# Patient Record
Sex: FEMALE | Employment: OTHER | URBAN - METROPOLITAN AREA
[De-identification: names, ages, dates, MRNs, and addresses within clinical notes are randomized per-mention and may not be internally consistent; named-entity substitution may affect disease eponyms.]

---

## 2017-01-03 ENCOUNTER — TELEPHONE (OUTPATIENT)
Dept: FAMILY MEDICINE | Facility: CLINIC | Age: 62
End: 2017-01-03

## 2017-01-03 NOTE — TELEPHONE ENCOUNTER
BP Readings from Last 3 Encounters:   08/10/16 164/85   07/26/16 136/92   05/27/16 148/90     Pt on BP fail list. No clear plan from last visit. Does pt need to be seen for BP or can I schedule MA only BP check?      Jory Olmedo, [facility]

## 2017-01-05 NOTE — TELEPHONE ENCOUNTER
Verified pts insurance with the  staff and everything went through with the insurance card we have on file. Please call pt back tomorrow and let her know and schedule physical.      Jory Olmedo, [facility]

## 2017-01-05 NOTE — TELEPHONE ENCOUNTER
"Called and spoke with pt. Pt states she is unsure if she has insurance right now and does not wasnt to schedule an appointment until she knows for sure. Pt asking me if I can verify if her insurance will go through or not. I stated I am unable to do that. Pt says \"someone here can do it, so if you can get someone to verify then I will make appointment.\" Pt says she called insurance and they say she is covered but she got a letter saying she isn't as of 1/1/17.  "

## 2017-01-16 ENCOUNTER — OFFICE VISIT (OUTPATIENT)
Dept: FAMILY MEDICINE | Facility: CLINIC | Age: 62
End: 2017-01-16
Payer: COMMERCIAL

## 2017-01-16 VITALS
OXYGEN SATURATION: 98 % | HEART RATE: 83 BPM | DIASTOLIC BLOOD PRESSURE: 76 MMHG | BODY MASS INDEX: 34.37 KG/M2 | WEIGHT: 231 LBS | TEMPERATURE: 98.3 F | SYSTOLIC BLOOD PRESSURE: 154 MMHG

## 2017-01-16 DIAGNOSIS — I48.0 PAROXYSMAL ATRIAL FIBRILLATION (H): Chronic | ICD-10-CM

## 2017-01-16 DIAGNOSIS — Z00.01 ENCOUNTER FOR GENERAL ADULT MEDICAL EXAMINATION WITH ABNORMAL FINDINGS: ICD-10-CM

## 2017-01-16 DIAGNOSIS — G47.33 OSA (OBSTRUCTIVE SLEEP APNEA): Chronic | ICD-10-CM

## 2017-01-16 DIAGNOSIS — R53.83 FATIGUE, UNSPECIFIED TYPE: ICD-10-CM

## 2017-01-16 DIAGNOSIS — E78.5 HYPERLIPIDEMIA LDL GOAL <130: Chronic | ICD-10-CM

## 2017-01-16 DIAGNOSIS — R07.0 THROAT PAIN: ICD-10-CM

## 2017-01-16 DIAGNOSIS — J45.20 INTERMITTENT ASTHMA, UNCOMPLICATED: Chronic | ICD-10-CM

## 2017-01-16 DIAGNOSIS — L72.3 SEBACEOUS CYST: Primary | ICD-10-CM

## 2017-01-16 DIAGNOSIS — J01.90 ACUTE SINUSITIS WITH SYMPTOMS > 10 DAYS: ICD-10-CM

## 2017-01-16 DIAGNOSIS — E03.4 HYPOTHYROIDISM DUE TO ACQUIRED ATROPHY OF THYROID: Chronic | ICD-10-CM

## 2017-01-16 DIAGNOSIS — Z91.018 FOOD ALLERGY: ICD-10-CM

## 2017-01-16 DIAGNOSIS — R09.81 NASAL CONGESTION: ICD-10-CM

## 2017-01-16 DIAGNOSIS — R13.10 DYSPHAGIA, UNSPECIFIED TYPE: ICD-10-CM

## 2017-01-16 DIAGNOSIS — R73.01 IMPAIRED FASTING GLUCOSE: ICD-10-CM

## 2017-01-16 DIAGNOSIS — I10 ESSENTIAL HYPERTENSION WITH GOAL BLOOD PRESSURE LESS THAN 140/90: Chronic | ICD-10-CM

## 2017-01-16 LAB
DEPRECATED S PYO AG THROAT QL EIA: NORMAL
MICRO REPORT STATUS: NORMAL
SPECIMEN SOURCE: NORMAL

## 2017-01-16 PROCEDURE — 99213 OFFICE O/P EST LOW 20 MIN: CPT | Mod: 25 | Performed by: FAMILY MEDICINE

## 2017-01-16 PROCEDURE — 87880 STREP A ASSAY W/OPTIC: CPT | Performed by: FAMILY MEDICINE

## 2017-01-16 PROCEDURE — 87081 CULTURE SCREEN ONLY: CPT | Performed by: FAMILY MEDICINE

## 2017-01-16 PROCEDURE — 99396 PREV VISIT EST AGE 40-64: CPT | Performed by: FAMILY MEDICINE

## 2017-01-16 RX ORDER — LORATADINE 10 MG/1
10 TABLET ORAL DAILY
Qty: 90 TABLET | Refills: 1 | Status: SHIPPED | OUTPATIENT
Start: 2017-01-16 | End: 2018-05-14 | Stop reason: ALTCHOICE

## 2017-01-16 RX ORDER — SULFAMETHOXAZOLE/TRIMETHOPRIM 800-160 MG
1 TABLET ORAL 2 TIMES DAILY
Qty: 20 TABLET | Refills: 0 | Status: SHIPPED | OUTPATIENT
Start: 2017-01-16 | End: 2017-02-20

## 2017-01-16 RX ORDER — EPINEPHRINE 0.3 MG/.3ML
0.3 INJECTION SUBCUTANEOUS
Qty: 0.6 ML | Refills: 1 | Status: SHIPPED | OUTPATIENT
Start: 2017-01-16 | End: 2018-03-20

## 2017-01-16 NOTE — MR AVS SNAPSHOT
After Visit Summary   1/16/2017    Marie Kaiser    MRN: [id number]           Patient Information     Date Of Birth          1955        Visit Information        Provider Department      1/16/2017 6:20 PM Rosalinda Muse MD [facility]        Today's Diagnoses     Nasal congestion    -  1     Throat pain         Dysphagia, unspecified type         Food allergy         Acute sinusitis with symptoms > 10 days           Care Instructions    The phone # for the sleep clinic is:  [facility] [place]  [phone]. Call to attempt to get set up for the CPAP instead of the oral appliance.    Mammogram recommended -  You can call [phone] to schedule this at the [facility] in [place] (formerly the [facility]).    Return to clinic for fasting labs and we will evaluate for causes for fatigue.    Return to clinic for lesion removal from neck and cyst removal from head.    For your shoulder pain - MRI orders available for neck and shoulder.    Preventive Health Recommendations  Female Ages 50 - 64    Yearly exam: See your health care provider every year in order to  o Review health changes.   o Discuss preventive care.    o Review your medicines if your doctor has prescribed any.      Get a Pap test every three years (unless you have an abnormal result and your provider advises testing more often).    If you get Pap tests with HPV test, you only need to test every 5 years, unless you have an abnormal result.     You do not need a Pap test if your uterus was removed (hysterectomy) and you have not had cancer.    You should be tested each year for STDs (sexually transmitted diseases) if you're at risk.     Have a mammogram every 1 to 2 years.    Have a colonoscopy at age 50, or have a yearly FIT test (stool test). These exams screen for colon cancer.      Have a cholesterol test every 5 years, or more often if  advised.    Have a diabetes test (fasting glucose) every three years. If you are at risk for diabetes, you should have this test more often.     If you are at risk for osteoporosis (brittle bone disease), think about having a bone density scan (DEXA).    Shots: Get a flu shot each year. Get a tetanus shot every 10 years.    Nutrition:     Eat at least 5 servings of fruits and vegetables each day.    Eat whole-grain bread, whole-wheat pasta and brown rice instead of white grains and rice.    Talk to your provider about Calcium and Vitamin D.     Lifestyle    Exercise at least 150 minutes a week (30 minutes a day, 5 days a week). This will help you control your weight and prevent disease.    Limit alcohol to one drink per day.    No smoking.     Wear sunscreen to prevent skin cancer.     See your dentist every six months for an exam and cleaning.    See your eye doctor every 1 to 2 years.            Follow-ups after your visit        Additional Services     GASTROENTEROLOGY ADULT REFERRAL +/- PROCEDURE       Your provider has referred you to Gastroenterology Services.    English    Procedure/Referral: PROCEDURE ONLY - UPPER GI ENDOSCOPY (EGD) - Reason for procedure: Dysphagia  FMG: [facility] [phone]   [email or web address]      Please be aware that coverage of these services is subject to the terms and limitations of your health insurance plan.  Call member services at your health plan with any benefit or coverage questions.  Any procedures must be performed at a [place] facility OR coordinated by your clinic's referral office.    Please bring the following with you to your appointment:    (1) Any X-Rays, CTs or MRIs which have been performed.  Contact the facility where they were done to arrange for  prior to your scheduled appointment.    (2) List of current medications   (3) This referral request   (4) Any documents/labs given to you for  this referral                  Who to contact     If you have questions or need follow up information about today's clinic visit or your schedule please contact [facility] directly at [phone].  Normal or non-critical lab and imaging results will be communicated to you by [employer], letter or phone within 4 business days after the clinic has received the results. If you do not hear from us within 7 days, please contact the clinic through [employer] or phone. If you have a critical or abnormal lab result, we will notify you by phone as soon as possible.  Submit refill requests through [employer] or call your pharmacy and they will forward the refill request to us. Please allow 3 business days for your refill to be completed.          Additional Information About Your Visit        [email or web address] Information     [employer] gives you secure access to your electronic health record. If you see a primary care provider, you can also send messages to your care team and make appointments. If you have questions, please call your primary care clinic.  If you do not have a primary care provider, please call [phone] and they will assist you.        Care EveryWhere ID     This is your Care EveryWhere ID. This could be used by other organizations to access your [place] medical records  [id number]        Your Vitals Were     Pulse Temperature Pulse Oximetry             83 98.3  F (36.8  C) (Oral) 98%          Blood Pressure from Last 3 Encounters:   01/16/17 154/76   08/10/16 164/85   07/26/16 136/92    Weight from Last 3 Encounters:   01/16/17 104.781 kg (231 lb)   08/10/16 103.874 kg (229 lb)   07/26/16 105.461 kg (232 lb 8 oz)              We Performed the Following     Beta strep group A culture     GASTROENTEROLOGY ADULT REFERRAL +/- PROCEDURE     Strep, Rapid Screen          Today's Medication Changes          These changes are accurate as of: 1/16/17  7:24 PM.  If you have any questions, ask your nurse or doctor.                Start taking these medicines.        Dose/Directions    sulfamethoxazole-trimethoprim 800-160 MG per tablet   Commonly known as:  BACTRIM DS/SEPTRA DS   Used for:  Acute sinusitis with symptoms > 10 days   Started by:  Rosalinda Muse MD        Dose:  1 tablet   Take 1 tablet by mouth 2 times daily   Quantity:  20 tablet   Refills:  0            Where to get your medicines      These medications were sent to [facility] [id number] [facility] [place] - [address]  [address] [place] [address]     Phone:  [phone]    - EPINEPHrine 0.3 MG/0.3ML injection  - loratadine 10 MG tablet  - sulfamethoxazole-trimethoprim 800-160 MG per tablet             Primary Care Provider Office Phone # Fax #    Rosalinda Muse [phone]       [address]  [facility] [address]        Thank you!     Thank you for choosing [facility]  for your care. Our goal is always to provide you with excellent care. Hearing back from our patients is one way we can continue to improve our services. Please take a few minutes to complete the written survey that you may receive in the mail after your visit with us. Thank you!             Your Updated Medication List - Protect others around you: Learn how to safely use, store and throw away your medicines at www.disposemymeds.org.          This list is accurate as of: 1/16/17  7:24 PM.  Always use your most recent med list.                   Brand Name Dispense Instructions for use    EPINEPHrine 0.3 MG/0.3ML injection     0.6 mL    Inject 0.3 mLs (0.3 mg) into the muscle once as needed for anaphylaxis       lisinopril 5 MG tablet    PRINIVIL/ZESTRIL    180 tablet    Take 1 tablet (5 mg) by mouth 2 times daily       loratadine 10 MG tablet    CLARITIN    90 tablet    Take 1 tablet (10 mg) by mouth daily       sulfamethoxazole-trimethoprim 800-160 MG per tablet    BACTRIM DS/SEPTRA DS    20 tablet    Take 1 tablet by mouth 2  times daily

## 2017-01-17 ASSESSMENT — ASTHMA QUESTIONNAIRES: ACT_TOTALSCORE: 17

## 2017-01-17 NOTE — PATIENT INSTRUCTIONS
The phone # for the sleep clinic is:  [facility] [place] [facility] [phone]. Call to attempt to get set up for the CPAP instead of the oral appliance.    Mammogram recommended -  You can call [phone] to schedule this at the [facility] in [place] (formerly the [facility]).    Return to clinic for fasting labs and we will evaluate for causes for fatigue.    Return to clinic for lesion removal from neck and cyst removal from head.    For your shoulder pain - MRI orders available for neck and shoulder.    Preventive Health Recommendations  Female Ages 50 - 64    Yearly exam: See your health care provider every year in order to  o Review health changes.   o Discuss preventive care.    o Review your medicines if your doctor has prescribed any.      Get a Pap test every three years (unless you have an abnormal result and your provider advises testing more often).    If you get Pap tests with HPV test, you only need to test every 5 years, unless you have an abnormal result.     You do not need a Pap test if your uterus was removed (hysterectomy) and you have not had cancer.    You should be tested each year for STDs (sexually transmitted diseases) if you're at risk.     Have a mammogram every 1 to 2 years.    Have a colonoscopy at age 50, or have a yearly FIT test (stool test). These exams screen for colon cancer.      Have a cholesterol test every 5 years, or more often if advised.    Have a diabetes test (fasting glucose) every three years. If you are at risk for diabetes, you should have this test more often.     If you are at risk for osteoporosis (brittle bone disease), think about having a bone density scan (DEXA).    Shots: Get a flu shot each year. Get a tetanus shot every 10 years.    Nutrition:     Eat at least 5 servings of fruits and vegetables each day.    Eat whole-grain bread, whole-wheat pasta and brown rice instead of white grains and  rice.    Talk to your provider about Calcium and Vitamin D.     Lifestyle    Exercise at least 150 minutes a week (30 minutes a day, 5 days a week). This will help you control your weight and prevent disease.    Limit alcohol to one drink per day.    No smoking.     Wear sunscreen to prevent skin cancer.     See your dentist every six months for an exam and cleaning.    See your eye doctor every 1 to 2 years.
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
## 2017-01-17 NOTE — NURSING NOTE
"Chief Complaint   Patient presents with     Physical       Initial /76 mmHg  Pulse 83  Temp(Src) 98.3  F (36.8  C) (Oral)  Wt 104.781 kg (231 lb)  SpO2 98% Estimated body mass index is 34.37 kg/(m^2) as calculated from the following:    Height as of 8/10/16: 1.746 m (5' 8.75\").    Weight as of this encounter: 104.781 kg (231 lb).  BP completed using cuff size: carmelita Olmedo CMA      "

## 2017-01-18 LAB
BACTERIA SPEC CULT: NORMAL
MICRO REPORT STATUS: NORMAL
SPECIMEN SOURCE: NORMAL

## 2017-01-18 NOTE — PROGRESS NOTES
Quick Note:    Your final throat culture is negative for strep.  Hopefully your symptoms are improving with treatment given/recommended.  Please call or MyChart message me if you have any questions.      PSK  ______

## 2017-02-20 ENCOUNTER — OFFICE VISIT (OUTPATIENT)
Dept: FAMILY MEDICINE | Facility: CLINIC | Age: 62
End: 2017-02-20
Payer: COMMERCIAL

## 2017-02-20 VITALS
WEIGHT: 233 LBS | TEMPERATURE: 98 F | SYSTOLIC BLOOD PRESSURE: 136 MMHG | OXYGEN SATURATION: 95 % | BODY MASS INDEX: 34.51 KG/M2 | DIASTOLIC BLOOD PRESSURE: 88 MMHG | HEART RATE: 84 BPM | HEIGHT: 69 IN

## 2017-02-20 DIAGNOSIS — I10 HYPERTENSION GOAL BP (BLOOD PRESSURE) < 140/90: Chronic | ICD-10-CM

## 2017-02-20 DIAGNOSIS — D22.30 NEVUS OF FACE: ICD-10-CM

## 2017-02-20 DIAGNOSIS — J45.20 INTERMITTENT ASTHMA, UNCOMPLICATED: Chronic | ICD-10-CM

## 2017-02-20 DIAGNOSIS — J01.01 ACUTE RECURRENT MAXILLARY SINUSITIS: Primary | ICD-10-CM

## 2017-02-20 DIAGNOSIS — G47.33 OSA (OBSTRUCTIVE SLEEP APNEA): Chronic | ICD-10-CM

## 2017-02-20 DIAGNOSIS — B35.4 TINEA CORPORIS: ICD-10-CM

## 2017-02-20 PROCEDURE — 99214 OFFICE O/P EST MOD 30 MIN: CPT | Performed by: FAMILY MEDICINE

## 2017-02-20 RX ORDER — AMOXICILLIN 500 MG/1
500 CAPSULE ORAL 3 TIMES DAILY
Qty: 30 CAPSULE | Refills: 0 | Status: SHIPPED | OUTPATIENT
Start: 2017-02-20 | End: 2017-04-13

## 2017-02-20 RX ORDER — CLOTRIMAZOLE 1 %
CREAM (GRAM) TOPICAL 2 TIMES DAILY
Qty: 15 G | Refills: 1 | Status: SHIPPED | OUTPATIENT
Start: 2017-02-20 | End: 2018-01-17

## 2017-02-20 ASSESSMENT — PAIN SCALES - GENERAL: PAINLEVEL: NO PAIN (0)

## 2017-02-20 NOTE — PROGRESS NOTES
SUBJECTIVE:                                                    Marie Kaiser is a 61 year old female who presents to clinic today for the following health issues:    Medication Followup of Bactrim. Daughter had Amoxcillin high dose and patient would like to further discuss about this as a option for treatment due to completely helped daughter's symptoms recently.    Taking Medication as prescribed: NO-patient self discontinued    Side Effects:  Explosive diarrhea    Medication Helping Symptoms:  NO, still having sinus pressure and congestion. Green nasal discharge and coughing. No fevers.      Concern - Rash bilateral hands     Onset: x5days    Description:   Patient states she works at a  and one of the boys have a skin condition that have not been checked at the clinic yet. Patient has been using usual creams, eye cream, but the antifungal medication bough OTC helped the rash quit spreading.    Intensity: moderate    Progression of Symptoms:  worsening    Accompanying Signs & Symptoms:  Itching in the beginning until used the antifungal cream, redness       Previous history of similar problem:   None    Precipitating factors:   Worsened by: None    Alleviating factors:  Improved by: OTC antifungal cream       Therapies Tried and outcome: Usual creams, eye cream, antifungal cream OTC      Hypertension Follow-up      Outpatient blood pressures are not being checked.    Low Salt Diet: no added salt     Asthma Follow-Up    Was ACT completed today?    Yes    ACT Total Scores 2/20/2017   ACT TOTAL SCORE (Goal Greater than or Equal to 20) 22   In the past 12 months, how many times did you visit the emergency room for your asthma without being admitted to the hospital? 0   In the past 12 months, how many times were you hospitalized overnight because of your asthma? 0     Hypothyroidism Follow-up      Since last visit, patient describes the following symptoms: Weight stable, no hair loss, no skin changes,  no constipation, no loose stools       Problem list and histories reviewed & adjusted, as indicated.  Additional history: as documented    Patient Active Problem List   Diagnosis     Intermittent asthma     Major depressive disorder, recurrent episode, in full remission (HCC)     Hypothyroidism     Impaired fasting glucose     Hyperlipidemia LDL goal <130     Hypertension goal BP (blood pressure) < 140/90     Advanced directives, counseling/discussion     Diverticulitis of colon     A-fib (H)     Obesity     DONA (obstructive sleep apnea)- mild (AHI 5)     Essential hypertension with goal blood pressure less than 140/90     Past Surgical History   Procedure Laterality Date     Carpal tunnel release rt/lt       bilaterally     Hc dilation/curettage diag/ther non ob       Dr. Sanchez     Colonoscopy  8/8/2011     Procedure:COLONOSCOPY; Surgeon:KEISHA DAVIS; Location: OR       Social History   Substance Use Topics     Smoking status: Never Smoker     Smokeless tobacco: Never Used     Alcohol use No     Family History   Problem Relation Age of Onset     Asthma Father      GASTROINTESTINAL DISEASE Father      hiatal hernia/acid reflux     HEART DISEASE Brother      Afib     Asthma Brother      Respiratory Brother      sleep apnea     DIABETES Brother      Hypertension Brother      Depression Brother      Respiratory Sister      sleep apnea     Hypertension Sister      Thyroid Disease Sister      Obesity Sister      Depression Sister      DIABETES Sister      Alzheimer Disease Mother      advanced dementia     C.A.D. No family hx of      Breast Cancer No family hx of      Cancer - colorectal No family hx of      Prostate Cancer No family hx of      CANCER No family hx of      Allergies Daughter      cats     DIABETES Other      neice and nephew         Current Outpatient Prescriptions   Medication Sig Dispense Refill     amoxicillin (AMOXIL) 500 MG capsule Take 1 capsule (500 mg) by mouth 3 times daily 30 capsule  "0     clotrimazole (LOTRIMIN) 1 % cream Apply topically 2 times daily 15 g 1     loratadine (CLARITIN) 10 MG tablet Take 1 tablet (10 mg) by mouth daily 90 tablet 1     EPINEPHrine 0.3 MG/0.3ML injection Inject 0.3 mLs (0.3 mg) into the muscle once as needed for anaphylaxis 0.6 mL 1     lisinopril (PRINIVIL,ZESTRIL) 5 MG tablet Take 1 tablet (5 mg) by mouth 2 times daily 180 tablet 1     Allergies   Allergen Reactions     Cozaar Swelling     Cymbalta      Elevated blood pressure     Lexapro      Anxiety, tremors.     Zoloft      Joint pain, kidney pain     Ciprofloxacin      \"tendons on feet pull and can't walk\"     Flonase [Fluticasone Propionate]      Nose Bleeds     Latex Swelling     Difficult breathing, tissue swelling     Latex      Levaquin Other (See Comments)     Muscle cramps     Metronidazole      Morphine      Made her \"shut down\"     Prozac [Fluoxetine Hcl]      suicidal     Synthroid [Levothyroxine Sodium]      Heart raced     Ceftin Itching     Recent Labs   Lab Test  05/12/16   1115  11/16/15   1858  02/19/14   1318  09/13/13 06/07/13   0822   06/22/12   0747   02/21/11   0916   A1C   --    --    --    --    --   6.0   --    --    --    --    LDL   --   122   --    --    --   124   --   163*   --   135*   HDL   --   80   --    --    --   52   --   53   --   56   TRIG   --   90   --    --    --   89   --   135   --   152*   ALT   --    --    --    --   22   --    --   10   --   15   CR   --   0.62   --    --   0.82  0.69   < >  0.69   < >  0.66   GFRESTIMATED   --   >90  Non  GFR Calc     --    --    --   87   < >  88   < >  >90   GFRESTBLACK   --   >90   GFR Calc     --    --    --   >90   < >  >90   < >  >90   POTASSIUM  3.9  3.7   --    --   3.8  4.3   < >  4.2   < >  3.8   TSH   --   3.06  2.78   < >   --   1.40   < >  2.27   --   2.31    < > = values in this interval not displayed.      BP Readings from Last 3 Encounters:   02/20/17 136/88   01/16/17 154/76 " "  08/10/16 164/85    Wt Readings from Last 3 Encounters:   02/20/17 233 lb (105.7 kg)   01/16/17 231 lb (104.8 kg)   08/10/16 229 lb (103.9 kg)                  Labs reviewed in EPIC  Problem list, Medication list, Allergies, and Medical/Social/Surgical histories reviewed in [facility] and updated as appropriate.    ROS:  Constitutional, HEENT, cardiovascular, pulmonary, gi and gu systems are negative, except as otherwise noted.    OBJECTIVE:                                                    /88  Pulse 84  Temp 98  F (36.7  C) (Oral)  Ht 5' 8.75\" (1.746 m)  Wt 233 lb (105.7 kg)  SpO2 95%  BMI 34.66 kg/m2  Body mass index is 34.66 kg/(m^2).  GENERAL: mild acutely ill, alert and no distress, obese  EYES: Eyes grossly normal to inspection, PERRL and conjunctivae and sclerae normal  HENT: ear canals and TM's normal, nose and mouth without ulcers or lesions, throat is erythematous with no exudate. Sinuses tender to percussion over maxillary sinus area. Nasal discharge present.  NECK: anterior cervical adenopathy, no asymmetry, masses, or scars and thyroid normal to palpation  RESP: lungs clear to auscultation - no rales, rhonchi or wheezes  CV: regular rate and rhythm, normal S1 S2, no S3 or S4, no murmur, click or rub, no peripheral edema and peripheral pulses strong  ABDOMEN: soft, nontender, no hepatosplenomegaly, no masses and bowel sounds normal  MS: no gross musculoskeletal defects noted, no edema  SKIN: no suspicious lesions, erythema and rash on hands in ring form consistent with either tinea corporis or granuloma annulare , dark nevus on left side of face with inflammation.   NEURO: Normal strength and tone, mentation intact and speech normal  PSYCH: mentation appears normal, affect normal/bright     Diagnostic Test Results:  Results for orders placed or performed in visit on 01/16/17   Strep, Rapid Screen   Result Value Ref Range    Specimen Description Throat     Rapid Strep A Screen       NEGATIVE: No Group " "A streptococcal antigen detected by immunoassay, await   culture report.      Micro Report Status FINAL 01/16/2017    Beta strep group A culture   Result Value Ref Range    Specimen Description Throat     Culture Micro No Beta Streptococcus isolated     Micro Report Status FINAL 01/18/2017         ASSESSMENT/PLAN:                                                        BMI:   Estimated body mass index is 34.66 kg/(m^2) as calculated from the following:    Height as of this encounter: 5' 8.75\" (1.746 m).    Weight as of this encounter: 233 lb (105.7 kg).   Weight management plan: Discussed healthy diet and exercise guidelines and patient will follow up in 1 month in clinic to re-evaluate.        ICD-10-CM    1. Acute recurrent maxillary sinusitis J01.01 amoxicillin (AMOXIL) 500 MG capsule three times a day for 10 days. Use Afrin nose spray or other 12 hour spray twice a day for 3 days. Follow this spray 5-10 minutes later with steroid nose spray after nasal passages open up. Continue to use the steroid nose spray twice a day for the next 2-3 weeks or longer to keep the nasal passages and sinuses open.     2. Tinea corporis B35.4 clotrimazole (LOTRIMIN) 1 % cream twice a day until cleared   3. Nevus of face D22.30 DERMATOLOGY REFERRAL- side of left face   4. Hypertension goal BP (blood pressure) < 140/90 I10 Well controlled on medications    5. Intermittent asthma, uncomplicated J45.20 Well controlled on medications    6. DONA (obstructive sleep apnea)- mild (AHI 5) G47.33 Needs to open sinuses and nasal passage to be effective.        FUTURE LABS:       - Schedule fasting labs in 6 months  FUTURE APPOINTMENTS:       - Follow-up visit in 3 months or sooner if any questions or concerns.   Work on weight loss  Regular exercise  See Patient Instructions    Keily John MD  [facility]    "

## 2017-02-20 NOTE — PATIENT INSTRUCTIONS
How to contact your care team: [phone] Pharmacy [phone]   CAS BARROW MD KATYA GEORGIEV, PA-C CHRIS JONES, PA-C NAM HO, MD JONATHAN BATES, MD ARVIN VOCAL, MD    Clinic hours M-Th 7am-7pm Fri 7am-5pm.   Urgent care M-F 11am-9pm  Sat/Sun 9am-5pm.   Pharmacy   Mon-Th:  8:00am-8pm   Fri:  8:00am-6:00pm  Sat/Sun  8:00am-5:00 pm       Fungal Skin Infection (Tinea)  A fungal infection is when too much fungus grows on or in the body. Fungus normally lives on the skin in small amounts and does not cause harm. But when too much grows on the skin, it causes an infection. This is also known as tinea. Fungal skin infections are common and not often serious.  The infection often starts as a small red area the size of a pea. The skin may turn dry and flaky. The area may itch. As the fungus grows, it spreads out in a red [place]. Because of how it looks, fungal skin infection is often called ringworm, but it is not caused by a worm. Fungal skin infections can occur on many parts of the body. They can grow on the head, chest, arms, or legs. They can occur on the buttocks. On the feet, fungal infection is known as  athlete s foot.  It causes itchy, sometimes painful sores between the toes and the bottom or sides of the feet. In the groin, the rash is called  jock itch.   People with weakened immune systems can get a fungal infection more easily. This includes people with diabetes or HIV, or who are being treated for cancer. In these cases, the fungal infection can spread and cause severe illness. Fungal infections are also more common in people who are obese.  In most cases, treatment is done with antifungal cream or ointment. If the infection is on your scalp, you may take oral medication. In some cases, a tiny piece of the skin (biopsy) may be taken. This is so it can be tested in a lab.  Common fungal infections are treated with creams on the skin or oral medicine.  Home care  Follow all  instructions when using antifungal cream or ointment on your skin. The health care provider may advise using cornstarch powder to keep your skin dry or petroleum jelly to provide a barrier.  General care:    If you were prescribed an oral medicine, read the patient information. Talk with the health care provider about the risks and side effects.    Let your skin dry completely after bathing. Carefully dry your feet and between your toes.    Dress in loose cotton clothing.    Don t scratch the affected area. This can delay healing and may spread the infection. It can also cause a bacterial infection.    Keep your skin clean, but don t wash the skin too much. This can irritate your skin.    Keep in mind that it may take a week before the fungus starts to go away. It can take 2 to 4 weeks to fully clear. To prevent it from coming back, use the medicine until the rash is all gone.  Follow-up care  Follow up with your health care provider if the rash does not get better after 10 days of treatment. Also follow up if the rash spreads to other parts of your body.  When to seek medical advice  Call your health care provider right away if any of these occur:    Fever of 100.4 F (38 C) or higher    Redness or swelling that gets worse    Pain that gets worse    Foul-smelling fluid leaking from the skin       8380-2972 [employer] [address] [place] [address], [place] [address]. All rights reserved. This information is not intended as a substitute for professional medical care. Always follow your healthcare professional's instructions.        Acute Sinusitis    Acute sinusitis is inflammation (irritation and swelling) of the sinuses. It is usually from a bacterial infection that follows an upper respiratory viral infection. Your doctor can help you find relief. Read on to learn more.  What is acute sinusitis?  Sinuses are air-filled spaces in the skull behind the face. They are kept moist and clean by a lining of  mucosa. Things such as pollen, smoke, and chemical fumes can irritate the mucosa. It can then become inflamed (swell up). As a response to irritation, the mucosa makes more mucus and other fluids. Tiny hairlike cilia cover the mucosa. Cilia help transport mucus toward the opening of the sinus. Too much mucus may cause the cilia to stop working. This blocks the sinus opening. A buildup of fluid in the sinuses then leads to symptoms such as pain and pressure. It can also encourage growth of bacteria in the sinuses.  Common symptoms of acute sinusitis  You may have:    Facial soreness pain    Headache    Fever    Postnasal drip (drainage in the back of the throat)    Congestion    Drainage that is thick and colored, instead of clear    Cough  Diagnosis of acute sinusitis  The doctor will ask about your symptoms and medical history. He or she will examine your ear, nose, and throat. X-rays are usually not needed. If your sinusitis recurs, you may have a culture to check for bacteria or imaging tests.     An evaluation will be done. A culture (sample of mucus) is sometimes taken to check for bacteria. If you have multiple bouts of sinusitis, imaging (X-rays or CAT scans) may be done to check for an anatomic cause of the infection.  Treatment of acute sinusitis  Treatment is designed to unblock the sinus opening and help the cilia work again. Antihistamine and decongestant medications may be prescribed. These can reduce inflammation and decrease fluid production. If a bacterial infection is present, it is treated with antibiotic medication for 10 to 14 days. This medication should be taken until it is gone, even if you feel better.    3893-4017 [employer] [address] [place] [address], [place] [address]. All rights reserved. This information is not intended as a substitute for professional medical care. Always follow your healthcare professional's instructions.

## 2017-02-20 NOTE — MR AVS SNAPSHOT
After Visit Summary   2/20/2017    Marie Kaiser    MRN: [id number]           Patient Information     Date Of Birth          1955        Visit Information        Provider Department      2/20/2017 4:40 PM Keily John MD [facility]        Today's Diagnoses     Acute recurrent maxillary sinusitis    -  1    Tinea corporis        Nevus of face          Care Instructions    How to contact your care team: [phone] Pharmacy [phone]   CAS BARROW MD KATYA GEORGIEV, PA-C CHRIS JONES, PA-C NAM HO, MD JONATHAN BATES, MD ARVIN VOCAL, MD    Clinic hours M-Th 7am-7pm Fri 7am-5pm.   Urgent care M-F 11am-9pm  Sat/Sun 9am-5pm.   Pharmacy   Mon-Th:  8:00am-8pm   Fri:  8:00am-6:00pm  Sat/Sun  8:00am-5:00 pm       Fungal Skin Infection (Tinea)  A fungal infection is when too much fungus grows on or in the body. Fungus normally lives on the skin in small amounts and does not cause harm. But when too much grows on the skin, it causes an infection. This is also known as tinea. Fungal skin infections are common and not often serious.  The infection often starts as a small red area the size of a pea. The skin may turn dry and flaky. The area may itch. As the fungus grows, it spreads out in a red [place]. Because of how it looks, fungal skin infection is often called ringworm, but it is not caused by a worm. Fungal skin infections can occur on many parts of the body. They can grow on the head, chest, arms, or legs. They can occur on the buttocks. On the feet, fungal infection is known as  athlete s foot.  It causes itchy, sometimes painful sores between the toes and the bottom or sides of the feet. In the groin, the rash is called  jock itch.   People with weakened immune systems can get a fungal infection more easily. This includes people with diabetes or HIV, or who are being treated for cancer. In these cases, the fungal infection can spread  and cause severe illness. Fungal infections are also more common in people who are obese.  In most cases, treatment is done with antifungal cream or ointment. If the infection is on your scalp, you may take oral medication. In some cases, a tiny piece of the skin (biopsy) may be taken. This is so it can be tested in a lab.  Common fungal infections are treated with creams on the skin or oral medicine.  Home care  Follow all instructions when using antifungal cream or ointment on your skin. The health care provider may advise using cornstarch powder to keep your skin dry or petroleum jelly to provide a barrier.  General care:    If you were prescribed an oral medicine, read the patient information. Talk with the health care provider about the risks and side effects.    Let your skin dry completely after bathing. Carefully dry your feet and between your toes.    Dress in loose cotton clothing.    Don t scratch the affected area. This can delay healing and may spread the infection. It can also cause a bacterial infection.    Keep your skin clean, but don t wash the skin too much. This can irritate your skin.    Keep in mind that it may take a week before the fungus starts to go away. It can take 2 to 4 weeks to fully clear. To prevent it from coming back, use the medicine until the rash is all gone.  Follow-up care  Follow up with your health care provider if the rash does not get better after 10 days of treatment. Also follow up if the rash spreads to other parts of your body.  When to seek medical advice  Call your health care provider right away if any of these occur:    Fever of 100.4 F (38 C) or higher    Redness or swelling that gets worse    Pain that gets worse    Foul-smelling fluid leaking from the skin       0381-6314 [employer]. [address] [place] [address], [place] [address]. All rights reserved. This information is not intended as a substitute for professional medical care. Always follow your  healthcare professional's instructions.        Acute Sinusitis    Acute sinusitis is inflammation (irritation and swelling) of the sinuses. It is usually from a bacterial infection that follows an upper respiratory viral infection. Your doctor can help you find relief. Read on to learn more.  What is acute sinusitis?  Sinuses are air-filled spaces in the skull behind the face. They are kept moist and clean by a lining of mucosa. Things such as pollen, smoke, and chemical fumes can irritate the mucosa. It can then become inflamed (swell up). As a response to irritation, the mucosa makes more mucus and other fluids. Tiny hairlike cilia cover the mucosa. Cilia help transport mucus toward the opening of the sinus. Too much mucus may cause the cilia to stop working. This blocks the sinus opening. A buildup of fluid in the sinuses then leads to symptoms such as pain and pressure. It can also encourage growth of bacteria in the sinuses.  Common symptoms of acute sinusitis  You may have:    Facial soreness pain    Headache    Fever    Postnasal drip (drainage in the back of the throat)    Congestion    Drainage that is thick and colored, instead of clear    Cough  Diagnosis of acute sinusitis  The doctor will ask about your symptoms and medical history. He or she will examine your ear, nose, and throat. X-rays are usually not needed. If your sinusitis recurs, you may have a culture to check for bacteria or imaging tests.     An evaluation will be done. A culture (sample of mucus) is sometimes taken to check for bacteria. If you have multiple bouts of sinusitis, imaging (X-rays or CAT scans) may be done to check for an anatomic cause of the infection.  Treatment of acute sinusitis  Treatment is designed to unblock the sinus opening and help the cilia work again. Antihistamine and decongestant medications may be prescribed. These can reduce inflammation and decrease fluid production. If a bacterial infection is present, it  is treated with antibiotic medication for 10 to 14 days. This medication should be taken until it is gone, even if you feel better.    2852-2677 [employer]. [address] [place] [address], [place] [address]. All rights reserved. This information is not intended as a substitute for professional medical care. Always follow your healthcare professional's instructions.              Follow-ups after your visit        Additional Services     DERMATOLOGY REFERRAL       Your provider has referred you to: [facility]: [facility] [phone]   [email or web address]    Please be aware that coverage of these services is subject to the terms and limitations of your health insurance plan.  Call member services at your health plan with any benefit or coverage questions.      Please bring the following with you to your appointment:    (1) Any X-Rays, CTs or MRIs which have been performed.  Contact the facility where they were done to arrange for  prior to your scheduled appointment.    (2) List of current medications  (3) This referral request   (4) Any documents/labs given to you for this referral                  Who to contact     If you have questions or need follow up information about today's clinic visit or your schedule please contact [facility] directly at [phone].  Normal or non-critical lab and imaging results will be communicated to you by MyChart, letter or phone within 4 business days after the clinic has received the results. If you do not hear from us within 7 days, please contact the clinic through MyChart or phone. If you have a critical or abnormal lab result, we will notify you by phone as soon as possible.  Submit refill requests through Rain or call your pharmacy and they will forward the refill request to us. Please allow 3 business days for your refill to be completed.          Additional  "Information About Your Visit        MyChart Information     [email or web address] gives you secure access to your electronic health record. If you see a primary care provider, you can also send messages to your care team and make appointments. If you have questions, please call your primary care clinic.  If you do not have a primary care provider, please call [phone] and they will assist you.        Care EveryWhere ID     This is your Care EveryWhere ID. This could be used by other organizations to access your [facility] medical records  [id number]        Your Vitals Were     Pulse Temperature Height Pulse Oximetry BMI (Body Mass Index)       84 98  F (36.7  C) (Oral) 5' 8.75\" (1.746 m) 95% 34.66 kg/m2        Blood Pressure from Last 3 Encounters:   02/20/17 (!) 136/94   01/16/17 154/76   08/10/16 164/85    Weight from Last 3 Encounters:   02/20/17 233 lb (105.7 kg)   01/16/17 231 lb (104.8 kg)   08/10/16 229 lb (103.9 kg)              We Performed the Following     DERMATOLOGY REFERRAL          Today's Medication Changes          These changes are accurate as of: 2/20/17  5:39 PM.  If you have any questions, ask your nurse or doctor.               Start taking these medicines.        Dose/Directions    amoxicillin 500 MG capsule   Commonly known as:  AMOXIL   Used for:  Acute recurrent maxillary sinusitis        Dose:  500 mg   Take 1 capsule (500 mg) by mouth 3 times daily   Quantity:  30 capsule   Refills:  0       clotrimazole 1 % cream   Commonly known as:  LOTRIMIN   Used for:  Tinea corporis        Apply topically 2 times daily   Quantity:  15 g   Refills:  1            Where to get your medicines      These medications were sent to [facility] [place] - [place], [place] - [address]  [address], [facility] [address]     Phone:  [phone]     amoxicillin 500 MG capsule    clotrimazole 1 % cream                Primary Care Provider Office Phone # Fax #    Rosalinda Muse [phone] " [phone]       [facility] [address]  [facility] [address]        Thank you!     Thank you for choosing [facility]  for your care. Our goal is always to provide you with excellent care. Hearing back from our patients is one way we can continue to improve our services. Please take a few minutes to complete the written survey that you may receive in the mail after your visit with us. Thank you!             Your Updated Medication List - Protect others around you: Learn how to safely use, store and throw away your medicines at www.disposemymeds.org.          This list is accurate as of: 2/20/17  5:39 PM.  Always use your most recent med list.                   Brand Name Dispense Instructions for use    amoxicillin 500 MG capsule    AMOXIL    30 capsule    Take 1 capsule (500 mg) by mouth 3 times daily       clotrimazole 1 % cream    LOTRIMIN    15 g    Apply topically 2 times daily       EPINEPHrine 0.3 MG/0.3ML injection     0.6 mL    Inject 0.3 mLs (0.3 mg) into the muscle once as needed for anaphylaxis       lisinopril 5 MG tablet    PRINIVIL/ZESTRIL    180 tablet    Take 1 tablet (5 mg) by mouth 2 times daily       loratadine 10 MG tablet    CLARITIN    90 tablet    Take 1 tablet (10 mg) by mouth daily

## 2017-02-20 NOTE — NURSING NOTE
"Chief Complaint   Patient presents with     Recheck Medication     Bactrim     Derm Problem     Bilateral hand rash x5days       Initial BP (!) 136/94 (BP Location: Right arm, Patient Position: Chair, Cuff Size: Adult Large)  Pulse 84  Temp 98  F (36.7  C) (Oral)  Ht 5' 8.75\" (1.746 m)  Wt 233 lb (105.7 kg)  SpO2 95%  BMI 34.66 kg/m2 Estimated body mass index is 34.66 kg/(m^2) as calculated from the following:    Height as of this encounter: 5' 8.75\" (1.746 m).    Weight as of this encounter: 233 lb (105.7 kg).  Medication Reconciliation: complete     Dennis Ching CMA     "

## 2017-02-21 ASSESSMENT — ASTHMA QUESTIONNAIRES: ACT_TOTALSCORE: 22

## 2017-03-03 ENCOUNTER — TELEPHONE (OUTPATIENT)
Dept: DERMATOLOGY | Facility: CLINIC | Age: 62
End: 2017-03-03

## 2017-03-03 NOTE — TELEPHONE ENCOUNTER
Dermatology Pre-visit Call:    Reason for visit : spot on neck and growth on head     Any personal history of skin cancers: No    Was the patient referred: Yes    If the patient was referred, are records obtained: No. (If no, then obtain records).    Has the patient seen a dermatologist in the past: No. (If yes, obtain records)    Patient Reminders Given:  --Please, make sure you bring an updated list of your medications.   --Plan on being in our facility for approximately one hour, this includes the registration process, office visit, education and check-out process.  If you are having a procedure, more time may be required.     --If you are having a procedure, please, present 15 minutes early.  --Location reviewed.   --If you need to cancel or reschedule, call   --We look forward to seeing you in Dermatology Clinic.

## 2017-03-06 ENCOUNTER — TELEPHONE (OUTPATIENT)
Dept: FAMILY MEDICINE | Facility: CLINIC | Age: 62
End: 2017-03-06

## 2017-03-06 NOTE — TELEPHONE ENCOUNTER
Reason for Call:  Medication or medication refill:    Do you use a [facility]?  Name of the pharmacy and phone number for the current request:  [id number] [place] [address]    Name of the medication requested: MONETASONE SUROATE    Other request: Pt saw Dr John on 2/20/2017 for a rash and the ointment that was prescribed was not helping and her daughter had some ointment , see above, that helped a lot and asking for a prescription for this.    Can we leave a detailed message on this number? YES    Phone number patient can be reached at: Cell number on file:    Telephone Information:   Mobile [phone] or [phone]       Best Time: any    Call taken on 3/6/2017 at 9:33 AM by Catie Coburn

## 2017-03-07 DIAGNOSIS — I10 ESSENTIAL HYPERTENSION WITH GOAL BLOOD PRESSURE LESS THAN 140/90: Chronic | ICD-10-CM

## 2017-03-08 NOTE — TELEPHONE ENCOUNTER
lisinopril (PRINIVIL,ZESTRIL) 5 MG tablet      Last Written Prescription Date: 10/24/16  Last Fill Quantity: 180, # refills: 1  Last Office Visit with G, [facility] or [facility] prescribing provider: 2/20/17       Potassium   Date Value Ref Range Status   05/12/2016 3.9 3.4 - 5.3 mmol/L Final     Creatinine   Date Value Ref Range Status   11/16/2015 0.62 0.52 - 1.04 mg/dL Final     BP Readings from Last 3 Encounters:   02/20/17 136/88   01/16/17 154/76   08/10/16 164/85

## 2017-03-09 ENCOUNTER — OFFICE VISIT (OUTPATIENT)
Dept: DERMATOLOGY | Facility: CLINIC | Age: 62
End: 2017-03-09
Payer: COMMERCIAL

## 2017-03-09 DIAGNOSIS — R22.9 SUBCUTANEOUS NODULE: ICD-10-CM

## 2017-03-09 DIAGNOSIS — D48.5 NEOPLASM OF UNCERTAIN BEHAVIOR OF SKIN: ICD-10-CM

## 2017-03-09 DIAGNOSIS — L71.9 ACNE ROSACEA: Primary | ICD-10-CM

## 2017-03-09 DIAGNOSIS — R21 RASH: ICD-10-CM

## 2017-03-09 DIAGNOSIS — D22.9 MULTIPLE BENIGN NEVI: ICD-10-CM

## 2017-03-09 DIAGNOSIS — L82.1 SEBORRHEIC KERATOSIS: ICD-10-CM

## 2017-03-09 PROCEDURE — 88305 TISSUE EXAM BY PATHOLOGIST: CPT | Performed by: DERMATOLOGY

## 2017-03-09 PROCEDURE — 11101 HC BIOPSY SKIN/SUBQ/MUC MEM, EACH ADDTL LESION: CPT | Performed by: DERMATOLOGY

## 2017-03-09 PROCEDURE — 99203 OFFICE O/P NEW LOW 30 MIN: CPT | Mod: 25 | Performed by: DERMATOLOGY

## 2017-03-09 PROCEDURE — 11100 HC BIOPSY SKIN/SUBQ/MUC MEM, SINGLE LESION: CPT | Performed by: DERMATOLOGY

## 2017-03-09 RX ORDER — MOMETASONE FUROATE 1 MG/G
CREAM TOPICAL
Qty: 45 G | Refills: 0 | Status: SHIPPED | OUTPATIENT
Start: 2017-03-09 | End: 2018-01-26

## 2017-03-09 NOTE — MR AVS SNAPSHOT
After Visit Summary   3/9/2017    Marie Kaiser    MRN: [id number]           Patient Information     Date Of Birth          1955        Visit Information        Provider Department      3/9/2017 8:15 AM Trudi Santizo MD [facility]        Today's Diagnoses     Acne rosacea    -  1    Subcutaneous nodule        Neoplasm of uncertain behavior of skin        Rash        Multiple benign nevi        Seborrheic keratosis          Care Instructions    Wound Care After a Biopsy    What is a skin biopsy?  A skin biopsy allows the doctor to examine a very small piece of tissue under the microscope to determine the diagnosis and the best treatment for the skin condition. A local anesthetic (numbing medicine)  is injected with a very small needle into the skin area to be tested. A small piece of skin is taken from the area. Sometimes a suture (stitch) is used.     What are the risks of a skin biopsy?  I will experience scar, bleeding, swelling, pain, crusting and redness. I may experience incomplete removal or recurrence. Risks of this procedure are excessive bleeding, bruising, infection, nerve damage, numbness, thick (hypertrophic or keloidal) scar and non-diagnostic biopsy.    How should I care for my wound for the first 24 hours?    Keep the wound dry and covered for 24 hours    If it bleeds, hold direct pressure on the area for 15 minutes. If bleeding does not stop then go to the emergency room    Avoid strenuous exercise the first 1-2 days or as your doctor instructs you    How should I care for the wound after 24 hours?    After 24 hours, remove the bandage    You may bathe or shower as normal    If you had a scalp biopsy, you can shampoo as usual and can use shower water to clean the biopsy site daily    Clean the wound twice a day with gentle soap and water    Do not scrub, be gentle    Apply white petroleum/Vaseline after cleaning the wound with a cotton swab or a clean  finger, and keep the site covered with a Bandaid /bandage. Bandages are not necessary with a scalp biopsy    If you are unable to cover the site with a Bandaid /bandage, re-apply ointment 2-3 times a day to keep the site moist. Moisture will help with healing    Avoid strenuous activity for first 1-2 days    Avoid lakes, rivers, pools, and oceans until the stitches are removed or the site is healed    How do I clean my wound?    Wash hands for 15 minutes with soap or use hand  before all wound care    Clean the wound with gentle soap and water    Apply white petroleum/Vaseline  to wound after it is clean    Replace the Bandaid /bandage to keep the wound covered for the first few days or as instructed by your doctor    If you had a scalp biopsy, warm shower water to the area on a daily basis should suffice    What should I use to clean my wound?     Cotton-tipped applicators (Qtips )    White petroleum jelly (Vaseline ). Use a clean new container and use Q-tips to apply.    Bandaids   as needed    Gentle soap     How should I care for my wound long term?    Do not get your wound dirty    Keep up with wound care for one week or until the area is healed.    A small scab will form and fall off by itself when the area is completely healed. The area will be red and will become pink in color as it heals. Sun protection is very important for how your scar will turn out. Sunscreen with an SPF 30 or greater is recommended once the area is healed.    If you have stitches, stitches need to be removed in 5-7 days. You may return to our clinic for this or you may have it done locally at your doctor s office.    You should have some soreness but it should be mild and slowly go away over several days. Talk to your doctor about using tylenol for pain,    When should I call my doctor?  If you have increased:     Pain or swelling    Pus or drainage (clear or slightly yellow drainage is ok)    Temperature over  100F    Spreading redness or warmth around wound    When will I hear about my results?  The biopsy results can take 2-3 weeks to come back. The clinic will call you with the results, send you a mychart message, or have you schedule a follow-up clinic or phone time to discuss the results. Contact our clinics if you do not hear from us in 3 weeks.     Who should I call with questions?    [facility]: [phone]     [facility]: [phone]    For urgent needs outside of business hours call the [facility] at [phone] and ask for the dermatology resident on call      Vanicream Moisturizing Skin Cream          [email or web address]          Follow-ups after your visit        Your next 10 appointments already scheduled     Mar 14, 2017 10:00 AM CDT   Nurse Only with NURSE ONLY MG DERM   [facility])    [address] [place] [id number]   [phone]            Apr 13, 2017  1:00 PM CDT   PROCEDURE with Emily Dubois MD   [facility])    [address] [place] [id number]   [phone]            Mar 09, 2018  8:45 AM CST   Return Visit with Trudi Santizo MD   [facility])    [address] [place] [id number]   [phone]              Who to contact     If you have questions or need follow up information about today's clinic visit or your schedule please contact [facility] directly at [phone].  Normal or non-critical lab and imaging results will be communicated to you by MyChart, letter or phone within 4 business days after the clinic has received the results. If you do not hear from us within 7 days, please contact the clinic through MyChart or phone. If you have a critical or abnormal lab result, we will  notify you by phone as soon as possible.  Submit refill requests through [employer] or call your pharmacy and they will forward the refill request to us. Please allow 3 business days for your refill to be completed.          Additional Information About Your Visit        [employer] Information     [employer] gives you secure access to your electronic health record. If you see a primary care provider, you can also send messages to your care team and make appointments. If you have questions, please call your primary care clinic.  If you do not have a primary care provider, please call [phone] and they will assist you.      [employer] is an electronic gateway that provides easy, online access to your medical records. With [employer], you can request a clinic appointment, read your test results, renew a prescription or communicate with your care team.     To access your existing account, please contact your [facility] or call [phone] for assistance.        Care EveryWhere ID     This is your Care EveryWhere ID. This could be used by other organizations to access your [facility] medical records  [id number]         Blood Pressure from Last 3 Encounters:   02/20/17 136/88   01/16/17 154/76   08/10/16 164/85    Weight from Last 3 Encounters:   02/20/17 105.7 kg (233 lb)   01/16/17 104.8 kg (231 lb)   08/10/16 103.9 kg (229 lb)              We Performed the Following     BIOPSY SKIN/SUBQ/MUC MEM, EACH ADDTL LESION     BIOPSY SKIN/SUBQ/MUC MEM, SINGLE LESION     Surgical pathology exam          Today's Medication Changes          These changes are accurate as of: 3/9/17  9:21 AM.  If you have any questions, ask your nurse or doctor.               Start taking these medicines.        Dose/Directions    mometasone 0.1 % cream   Commonly known as:  ELOCON   Used for:  Rash   Started by:  Trudi Santizo MD        Apply twice daily for up to ten days   Quantity:  45 g   Refills:  0            Where to get  your medicines      These medications were sent to [facility] [address] [address] [facility] [place] - [address]  [address] [address]     Phone:  [phone]     mometasone 0.1 % cream                Primary Care Provider Office Phone # Fax #    Rosalinda Muse [phone]       [facility] [address]  [facility] [address]        Thank you!     Thank you for choosing [facility]  for your care. Our goal is always to provide you with excellent care. Hearing back from our patients is one way we can continue to improve our services. Please take a few minutes to complete the written survey that you may receive in the mail after your visit with us. Thank you!             Your Updated Medication List - Protect others around you: Learn how to safely use, store and throw away your medicines at www.disposemymeds.org.          This list is accurate as of: 3/9/17  9:21 AM.  Always use your most recent med list.                   Brand Name Dispense Instructions for use    amoxicillin 500 MG capsule    AMOXIL    30 capsule    Take 1 capsule (500 mg) by mouth 3 times daily       clotrimazole 1 % cream    LOTRIMIN    15 g    Apply topically 2 times daily       EPINEPHrine 0.3 MG/0.3ML injection     0.6 mL    Inject 0.3 mLs (0.3 mg) into the muscle once as needed for anaphylaxis       lisinopril 5 MG tablet    PRINIVIL/ZESTRIL    180 tablet    Take 1 tablet (5 mg) by mouth 2 times daily       loratadine 10 MG tablet    CLARITIN    90 tablet    Take 1 tablet (10 mg) by mouth daily       mometasone 0.1 % cream    ELOCON    45 g    Apply twice daily for up to ten days

## 2017-03-09 NOTE — LETTER
Patient:  Marie Kaiser  :   1955  MRN:     [id number]        Ms.Ashley JUDITH Kaiser  [address] [place] [id number]        2017    Dear ,    We are writing to inform you of your test results that show harmless spots. Normal moles on the left buttock and  left cheek were seen. Also,  an oil gland on the forehead and a harmless keratosis on the neck. No more treatment is needed.       Thank you for taking the time to be seen in our dermatology clinic. If you have further questions or concerns, please contact the clinic(see phone number listed below).       Sincerely,     Trudi Santizo MD      Department of Dermatology   [facility]: Phone: [phone], Fax:[phone]   [facility]: Phone: [phone], Fax: [phone]     Resulted Orders   Surgical pathology exam   Result Value Ref Range    Copath Report       Patient Name: MARIE KAISER  MR#: [id number]  Specimen #: [id number]  Collected: 3/9/2017  Received: 3/9/2017  Reported: 3/13/2017 11:37  Ordering Phy(s): TRUDI SANTIZO    For improved result formatting, select 'View Enhanced Report Format'  under Linked Documents section.    SPECIMEN(S):  A: Skin, left buttock  B: Skin, left cheek  C: Skin, left forehead  D: Skin, left neck    FINAL DIAGNOSIS:  A.  Skin, buttock, left:  - Intradermal melanocytic nevus - (see description)    B.  Skin, cheek, left:  - Compound melanocytic nevus - (see description)    C.  Skin, forehead, left:  - Sebaceous hyperplasia - (see description)    D.  Skin, neck, left:  - Seborrheic keratosis - (see description)    I have personally reviewed all specimens and or slides, including the  listed special stains, and used them with my medical judgement to  determine the final diagnosis.    Electronically signed out by:    Jovan Alonso M.D., [facility]    CLINICAL HISTORY:  The  "patient is a 61-year-old abner zamarripa.    GROSS:  A. The specimen is received in formalin with proper patient  identification and labeled \"skin, left buttock\" and consists of a 0.5 x  0.5 cm shave, remarkable for a 0.4 x 0.4 x 0.2 cm skin colored papule.  The base margin is inked in black.  Bisected and entirely submitted in  one cassette.    B. The specimen is received in formalin with proper patient  identification and labeled \"skin, left cheek\" and consists of a 0.2 x  0.2 cm shave, remarkable for pigmented papule. Entirely submitted in one  cassette.    C. The specimen is received in formalin with proper patient's  identification, labeled \"skin, left forehead\".  The specimen consists of  a 2 mm diameter punch skin biopsy tissue fragment measuring 0.2 cm in  depth.  Entirely submitted in one cassette.    D. The specimen is received in formalin with proper patient  identification and labeled \"skin, left neck\" and consists of a 0.7 x 0.7  x 0.2 cm shave, remarkable for scaly papule. The base margin is inked  black.  Tr isected and entirely submitted in one cassette. (Dictated by:  Theo Chamberlain 3/9/2017 02:56 PM)    MICROSCOPIC:  A.  The specimen shows intradermal nests and strands of benign-appearing  nevus cells without a prominent junctional component.  The lesion  extends to the deep margin.    B.  The specimen exhibits a compound melanocytic proliferation which is  predominantly intradermal, but with rare small nests at the  dermoepidermal junction, above nests and cords of melanocytes which  mature with descent in the dermis.  The lesion extends to the deep  margin.    C.  The specimen exhibits abundant mature sebaceous lobules radiating  around a central follicular unit.    D.  The specimen shows hyperkeratosis with basaloid epidermal  hyperplasia and horn cyst formation typical of a seborrheic keratosis.    CPT Codes:  A: 21434-CW8.T, 60187-SW0.P  B: 35875-BH4.T, 62823-SX3.P  C: 29597-UY7.T, 13377-IC3.P  D: " 87073-IM1.T, 64227-NN4.P    TESTING LAB LOCATION:  [facility], [address]   [address] [phone]    COLLECTION SITE:  Client: [facility]  Location: [facility])

## 2017-03-09 NOTE — PROGRESS NOTES
"[facility] Dermatology Note      Dermatology Problem List:  1. Rash, bilateral hands  -Previous Tx: OTC anti-fungal, OTC topicals without improvement, clotrimazole (initiated 2/202017) without improvement, mometasone with impovement    Encounter Date: Mar 9, 2017    CC:  Chief Complaint   Patient presents with     Derm Problem     mole on the LT side of the neck and on top of the head         History of Present Illness:  Ms. Marie Kaiser is a 61 year old female who presents as a referral from Dr. John for lesion on the left side of face. Today, the patient reports a growing lesion on the scalp. Also has a lesion on the left neck that she has had evaluated by two other physicians. The area recently became pruritic in the past week. Also notes a history of \"odd scarring\" after injury, she has a space on the scalp that appeared after she bumped her head on a cabinet. Reports history of \"unusual mole\" removal on the face. Reports the lesion on the left cheek comes and goes. Reports history of rash on the hands which did not improve with clotrimazole but improved with mometasone. The patient reports no other lesions of concern.    Past Medical History:   Patient Active Problem List   Diagnosis     Intermittent asthma     Major depressive disorder, recurrent episode, in full remission (HCC)     Hypothyroidism     Impaired fasting glucose     Hyperlipidemia LDL goal <130     Hypertension goal BP (blood pressure) < 140/90     Advanced directives, counseling/discussion     Diverticulitis of colon     A-fib (H)     Obesity     DONA (obstructive sleep apnea)- mild (AHI 5)     Essential hypertension with goal blood pressure less than 140/90     Past Medical History   Diagnosis Date     Abnormal Papanicolaou smear of cervix and cervical HPV      Allergic rhinitis due to other allergen      Backache, unspecified      Endometrial hyperplasia      Esophageal reflux      Female stress incontinence      " "Headache(784.0)      Intervertebral lumbar disc disorder with myelopathy, lumbar region      Irritable bowel syndrome      Lump or mass in breast      Myalgia and myositis, unspecified      Palpitations 5/26/2009     Panic disorder without agoraphobia      Symptomatic menopausal or female climacteric states      Viral warts, unspecified      Past Surgical History   Procedure Laterality Date     Carpal tunnel release rt/lt       bilaterally     Hc dilation/curettage diag/ther non ob       Dr. Sanchez     Colonoscopy  8/8/2011     Procedure:COLONOSCOPY; Surgeon:KEISHA DAVIS; Location: OR     Social History:  The patient works as a  provider. The patient denies use of tanning beds. The patient does not drink alcohol, smoke or use tobacco.    Family History:  There is no family history of skin cancer.  There is a family history of psoriasis, asthma and autoimmune disease.    Medications:  Current Outpatient Prescriptions   Medication Sig Dispense Refill     amoxicillin (AMOXIL) 500 MG capsule Take 1 capsule (500 mg) by mouth 3 times daily 30 capsule 0     clotrimazole (LOTRIMIN) 1 % cream Apply topically 2 times daily 15 g 1     loratadine (CLARITIN) 10 MG tablet Take 1 tablet (10 mg) by mouth daily 90 tablet 1     EPINEPHrine 0.3 MG/0.3ML injection Inject 0.3 mLs (0.3 mg) into the muscle once as needed for anaphylaxis 0.6 mL 1     lisinopril (PRINIVIL,ZESTRIL) 5 MG tablet Take 1 tablet (5 mg) by mouth 2 times daily 180 tablet 1     Allergies   Allergen Reactions     Cozaar Swelling     Cymbalta      Elevated blood pressure     Lexapro      Anxiety, tremors.     Zoloft      Joint pain, kidney pain     Ciprofloxacin      \"tendons on feet pull and can't walk\"     Flonase [Fluticasone Propionate]      Nose Bleeds     Latex Swelling     Difficult breathing, tissue swelling     Latex      Levaquin Other (See Comments)     Muscle cramps     Metronidazole      Morphine      Made her \"shut down\"     Prozac " [Fluoxetine Hcl]      suicidal     Synthroid [Levothyroxine Sodium]      Heart raced     Ceftin Itching     Review of Systems:  -Skin/Heme New Pt: The patient admits to frequent sun exposure. The patient denies excessive scarring or problems healing except as per HPI. The patient denies excessive bleeding.  -Const: Denies fevers or chills  -Skin: As above in HPI. No additional skin concerns.    Physical exam:  Vitals: There were no vitals taken for this visit.  GEN: This is a well developed, well-nourished female in no acute distress, in a pleasant mood.    SKIN: Total skin excluding the undergarment areas was performed. The exam included the head/face, neck, both arms, chest, back, abdomen, both legs, digits and/or nails. Including exam of the buttocks, declines genital exam  -Multiple regular brown pigmented macules and papules are identified on the trunk and extremities.   -There are waxy stuck on tan to brown papules on the trunk and extremities.  -Stuck on 1.2cm plaque on the left neck.   -Yellow oily 3mm papule on the left forehead.   -Pigmented 3mm papule on the left cheek.   -Faint pink patches on the dorsal hands.   -1cm subcutaneous nodule on the left vertex scalp.  -Fleshy 5mm papule on the left buttock.   -No other lesions of concern on areas examined.     Impression/Plan:  1. Multiple clinically benign nevi on the trunk and extremities    No further intervention required at this time.  2. Seborrheic keratosis, trunk and extremities     No further intervention required at this time.   3. 1cm subcutaneous nodule, left vertex scalp. Favor cyst    Plan to schedule for excision.  4. Faint pink patches, dorsal hands. Very faint today. Improved with topicals steroids, rash most consistent with eczema    For flares, start mometasone cream. Apply twice daily for up to 10 days.     Recommend moisturizer    Call clinic if not resolved in 2 weeks.   5. Fleshy 5mm papule, left buttock. History of catching and  irritation. Neoplasm of uncertain behavior. Differential diagnosis includes nevus versus neurofibroma, patient desires removal    Shave biopsy:  After discussion of benefits and risks including but not limited to bleeding/bruising, pain/swelling, infection, scar, incomplete removal, nerve damage/numbness, recurrence, and non-diagnostic biopsy, written consent, verbal consent and photographs were obtained. Time-out was performed. The area was cleaned with isopropyl alcohol. 0.5 mL of 1% lidocaine with epinephrine was injected to obtain adequate anesthesia of the lesion on the left buttock. A shave biopsy was performed. Hemostasis was achieved with aluminium chloride. Vaseline and a sterile dressing were applied. The patient tolerated the procedure and no complications were noted. The patient was provided with verbal and written post care instructions.   6. Pigmented 3mm papule, left cheek. Neoplasm of uncertain behavior. Differential diagnosis includes nevus versus BCC    Shave biopsy:  After discussion of benefits and risks including but not limited to bleeding/bruising, pain/swelling, infection, scar, incomplete removal, nerve damage/numbness, recurrence, and non-diagnostic biopsy, written consent, verbal consent and photographs were obtained. Time-out was performed. The area was cleaned with isopropyl alcohol. 0.5 mL of 1% lidocaine with epinephrine was injected to obtain adequate anesthesia of the lesion on the left cheek. A shave biopsy was performed. Hemostasis was achieved with aluminium chloride. Vaseline and a sterile dressing were applied. The patient tolerated the procedure and no complications were noted. The patient was provided with verbal and written post care instructions.   7. Yellow oily 3mm papule, left forehead. Neoplasm of uncertain behavior. Differential diagnosis includes sebaceous hyperplasia versus other    Punch biopsy:  After discussion of benefits and risks including but not limited to  bleeding/bruising, pain/swelling, infection, scar, incomplete removal, nerve damage/numbness, recurrence, and non-diagnostic biopsy, written consent, verbal consent and photographs were obtained. Time-out was performed. The area was cleaned with isopropyl alcohol. 0.5 mL of 1% lidocaine with epinephrine was injected to obtain adequate anesthesia of the lesion on the left forehead. A 2 mm punch biopsy was performed.  6-0 prolene sutures were utilized to approximate the epidermal edges.  White petroleum jelly/VaselineTM and a bandage was applied to the wound.  Explicit verbal and written wound care instructions were provided.  The patient left the Dermatology Clinic in good condition. The patient was counseled to follow up for suture removal in approximately 5-7 days.  8. Stuck on 1.2cm plaque, left neck. Neoplasm of uncertain behavior. Differential diagnosis includes SK versus other. Pt desires removal    Shave biopsy:  After discussion of benefits and risks including but not limited to bleeding/bruising, pain/swelling, infection, scar, incomplete removal, nerve damage/numbness, recurrence, and non-diagnostic biopsy, written consent, verbal consent and photographs were obtained. Time-out was performed. The area was cleaned with isopropyl alcohol. 0.5 mL of 1% lidocaine with epinephrine was injected to obtain adequate anesthesia of the lesion on the left neck. A shave biopsy was performed. Hemostasis was achieved with aluminium chloride. Vaseline and a sterile dressing were applied. The patient tolerated the procedure and no complications were noted. The patient was provided with verbal and written post care instructions.     CC Dr. John on close of this encounter.  Follow-up in for excision and in 1 year(patient preference), earlier pending biopsy, earlier for new or changing lesions.     Staff Involved:  Scribe/Staff    Scribe Disclosure:   Rhea OLSEN, am serving as a scribe to document services personally  performed by Dr. Trudi Santizo, based on data collection and the provider's statements to me.     Provider Disclosure:   I agree with above History, Review of Systems, Physical exam and Plan. I have reviewed the content of the documentation and have edited it as needed. I have personally performed the services documented here and the documentation accurately represents those services and the decisions I have made.     Trudi Santizo MD    Department of Dermatology  [facility]: Phone: [phone], Fax:[phone]  [facility]: Phone: [phone], Fax: [phone]

## 2017-03-09 NOTE — PATIENT INSTRUCTIONS
Wound Care After a Biopsy    What is a skin biopsy?  A skin biopsy allows the doctor to examine a very small piece of tissue under the microscope to determine the diagnosis and the best treatment for the skin condition. A local anesthetic (numbing medicine)  is injected with a very small needle into the skin area to be tested. A small piece of skin is taken from the area. Sometimes a suture (stitch) is used.     What are the risks of a skin biopsy?  I will experience scar, bleeding, swelling, pain, crusting and redness. I may experience incomplete removal or recurrence. Risks of this procedure are excessive bleeding, bruising, infection, nerve damage, numbness, thick (hypertrophic or keloidal) scar and non-diagnostic biopsy.    How should I care for my wound for the first 24 hours?    Keep the wound dry and covered for 24 hours    If it bleeds, hold direct pressure on the area for 15 minutes. If bleeding does not stop then go to the emergency room    Avoid strenuous exercise the first 1-2 days or as your doctor instructs you    How should I care for the wound after 24 hours?    After 24 hours, remove the bandage    You may bathe or shower as normal    If you had a scalp biopsy, you can shampoo as usual and can use shower water to clean the biopsy site daily    Clean the wound twice a day with gentle soap and water    Do not scrub, be gentle    Apply white petroleum/Vaseline after cleaning the wound with a cotton swab or a clean finger, and keep the site covered with a Bandaid /bandage. Bandages are not necessary with a scalp biopsy    If you are unable to cover the site with a Bandaid /bandage, re-apply ointment 2-3 times a day to keep the site moist. Moisture will help with healing    Avoid strenuous activity for first 1-2 days    Avoid lakes, rivers, pools, and oceans until the stitches are removed or the site is healed    How do I clean my wound?    Wash hands for 15 minutes with soap or use hand  before  all wound care    Clean the wound with gentle soap and water    Apply white petroleum/Vaseline  to wound after it is clean    Replace the Bandaid /bandage to keep the wound covered for the first few days or as instructed by your doctor    If you had a scalp biopsy, warm shower water to the area on a daily basis should suffice    What should I use to clean my wound?     Cotton-tipped applicators (Qtips )    White petroleum jelly (Vaseline ). Use a clean new container and use Q-tips to apply.    Bandaids   as needed    Gentle soap     How should I care for my wound long term?    Do not get your wound dirty    Keep up with wound care for one week or until the area is healed.    A small scab will form and fall off by itself when the area is completely healed. The area will be red and will become pink in color as it heals. Sun protection is very important for how your scar will turn out. Sunscreen with an SPF 30 or greater is recommended once the area is healed.    If you have stitches, stitches need to be removed in 5-7 days. You may return to our clinic for this or you may have it done locally at your doctor s office.    You should have some soreness but it should be mild and slowly go away over several days. Talk to your doctor about using tylenol for pain,    When should I call my doctor?  If you have increased:     Pain or swelling    Pus or drainage (clear or slightly yellow drainage is ok)    Temperature over 100F    Spreading redness or warmth around wound    When will I hear about my results?  The biopsy results can take 2-3 weeks to come back. The clinic will call you with the results, send you a Alma Johns message, or have you schedule a follow-up clinic or phone time to discuss the results. Contact our clinics if you do not hear from us in 3 weeks.     Who should I call with questions?    [facility]: [phone]     [facility]:  [phone]    For urgent needs outside of business hours call the [facility] at [phone] and ask for the dermatology resident on call      Vanicream Moisturizing Skin Cream          [email or web address]

## 2017-03-09 NOTE — NURSING NOTE
Dermatology Rooming Note    Marie Kaiser's goals for this visit include:   Chief Complaint   Patient presents with     Derm Problem     mole on the LT side of the neck and on top of the head       Is a scribe okay for this visit:YES    Are records needed for this visit(If yes, obtain release of information): Not applicable     Vitals: There were no vitals taken for this visit.    Referring Provider:  Keily John MD  [place] [address]  [facility] [place] [address]

## 2017-03-09 NOTE — LETTER
"3/9/2017       RE: Marie Kaiser  [address] [place] [id number]     Dear Colleague,    Thank you for referring your patient, Marie Kaiser, to the [facility] at [facility]. Please see a copy of my visit note below.    [facility] Dermatology Note      Dermatology Problem List:  1. Rash, bilateral hands  -Previous Tx: OTC anti-fungal, OTC topicals without improvement, clotrimazole (initiated 2/202017) without improvement, mometasone with impovement    Encounter Date: Mar 9, 2017    CC:  Chief Complaint   Patient presents with     Derm Problem     mole on the LT side of the neck and on top of the head         History of Present Illness:  Ms. Marie Kaiser is a 61 year old female who presents as a referral from Dr. John for lesion on the left side of face. Today, the patient reports a growing lesion on the scalp. Also has a lesion on the left neck that she has had evaluated by two other physicians. The area recently became pruritic in the past week. Also notes a history of \"odd scarring\" after injury, she has a space on the scalp that appeared after she bumped her head on a cabinet. Reports history of \"unusual mole\" removal on the face. Reports the lesion on the left cheek comes and goes. Reports history of rash on the hands which did not improve with clotrimazole but improved with mometasone. The patient reports no other lesions of concern.    Past Medical History:   Patient Active Problem List   Diagnosis     Intermittent asthma     Major depressive disorder, recurrent episode, in full remission (HCC)     Hypothyroidism     Impaired fasting glucose     Hyperlipidemia LDL goal <130     Hypertension goal BP (blood pressure) < 140/90     Advanced directives, counseling/discussion     Diverticulitis of colon     A-fib (H)     Obesity     DONA (obstructive sleep apnea)- mild (AHI 5)     Essential hypertension with " goal blood pressure less than 140/90     Past Medical History   Diagnosis Date     Abnormal Papanicolaou smear of cervix and cervical HPV      Allergic rhinitis due to other allergen      Backache, unspecified      Endometrial hyperplasia      Esophageal reflux      Female stress incontinence      Headache(784.0)      Intervertebral lumbar disc disorder with myelopathy, lumbar region      Irritable bowel syndrome      Lump or mass in breast      Myalgia and myositis, unspecified      Palpitations 5/26/2009     Panic disorder without agoraphobia      Symptomatic menopausal or female climacteric states      Viral warts, unspecified      Past Surgical History   Procedure Laterality Date     Carpal tunnel release rt/lt       bilaterally     Hc dilation/curettage diag/ther non ob       Dr. Sanchez     Colonoscopy  8/8/2011     Procedure:COLONOSCOPY; Surgeon:KEISHA DAVIS; Location: OR     Social History:  The patient works as a  provider. The patient denies use of tanning beds. The patient does not drink alcohol, smoke or use tobacco.    Family History:  There is no family history of skin cancer.  There is a family history of psoriasis, asthma and autoimmune disease.    Medications:  Current Outpatient Prescriptions   Medication Sig Dispense Refill     amoxicillin (AMOXIL) 500 MG capsule Take 1 capsule (500 mg) by mouth 3 times daily 30 capsule 0     clotrimazole (LOTRIMIN) 1 % cream Apply topically 2 times daily 15 g 1     loratadine (CLARITIN) 10 MG tablet Take 1 tablet (10 mg) by mouth daily 90 tablet 1     EPINEPHrine 0.3 MG/0.3ML injection Inject 0.3 mLs (0.3 mg) into the muscle once as needed for anaphylaxis 0.6 mL 1     lisinopril (PRINIVIL,ZESTRIL) 5 MG tablet Take 1 tablet (5 mg) by mouth 2 times daily 180 tablet 1     Allergies   Allergen Reactions     Cozaar Swelling     Cymbalta      Elevated blood pressure     Lexapro      Anxiety, tremors.     Zoloft      Joint pain, kidney pain      "Ciprofloxacin      \"tendons on feet pull and can't walk\"     Flonase [Fluticasone Propionate]      Nose Bleeds     Latex Swelling     Difficult breathing, tissue swelling     Latex      Levaquin Other (See Comments)     Muscle cramps     Metronidazole      Morphine      Made her \"shut down\"     Prozac [Fluoxetine Hcl]      suicidal     Synthroid [Levothyroxine Sodium]      Heart raced     Ceftin Itching     Review of Systems:  -Skin/Heme New Pt: The patient admits to frequent sun exposure. The patient denies excessive scarring or problems healing except as per HPI. The patient denies excessive bleeding.  -Const: Denies fevers or chills  -Skin: As above in HPI. No additional skin concerns.    Physical exam:  Vitals: There were no vitals taken for this visit.  GEN: This is a well developed, well-nourished female in no acute distress, in a pleasant mood.    SKIN: Total skin excluding the undergarment areas was performed. The exam included the head/face, neck, both arms, chest, back, abdomen, both legs, digits and/or nails. Including exam of the buttocks, declines genital exam  -Multiple regular brown pigmented macules and papules are identified on the trunk and extremities.   -There are waxy stuck on tan to brown papules on the trunk and extremities.  -Stuck on 1.2cm plaque on the left neck.   -Yellow oily 3mm papule on the left forehead.   -Pigmented 3mm papule on the left cheek.   -Faint pink patches on the dorsal hands.   -1cm subcutaneous nodule on the left vertex scalp.  -Fleshy 5mm papule on the left buttock.   -No other lesions of concern on areas examined.     Impression/Plan:  1. Multiple clinically benign nevi on the trunk and extremities    No further intervention required at this time.  2. Seborrheic keratosis, trunk and extremities     No further intervention required at this time.   3. 1cm subcutaneous nodule, left vertex scalp. Favor cyst    Plan to schedule for excision.  4. Faint pink patches, dorsal " hands. Very faint today. Improved with topicals steroids, rash most consistent with eczema    For flares, start mometasone cream. Apply twice daily for up to 10 days.     Recommend moisturizer    Call clinic if not resolved in 2 weeks.   5. Fleshy 5mm papule, left buttock. History of catching and irritation. Neoplasm of uncertain behavior. Differential diagnosis includes nevus versus neurofibroma, patient desires removal    Shave biopsy:  After discussion of benefits and risks including but not limited to bleeding/bruising, pain/swelling, infection, scar, incomplete removal, nerve damage/numbness, recurrence, and non-diagnostic biopsy, written consent, verbal consent and photographs were obtained. Time-out was performed. The area was cleaned with isopropyl alcohol. 0.5 mL of 1% lidocaine with epinephrine was injected to obtain adequate anesthesia of the lesion on the left buttock. A shave biopsy was performed. Hemostasis was achieved with aluminium chloride. Vaseline and a sterile dressing were applied. The patient tolerated the procedure and no complications were noted. The patient was provided with verbal and written post care instructions.   6. Pigmented 3mm papule, left cheek. Neoplasm of uncertain behavior. Differential diagnosis includes nevus versus BCC    Shave biopsy:  After discussion of benefits and risks including but not limited to bleeding/bruising, pain/swelling, infection, scar, incomplete removal, nerve damage/numbness, recurrence, and non-diagnostic biopsy, written consent, verbal consent and photographs were obtained. Time-out was performed. The area was cleaned with isopropyl alcohol. 0.5 mL of 1% lidocaine with epinephrine was injected to obtain adequate anesthesia of the lesion on the left cheek. A shave biopsy was performed. Hemostasis was achieved with aluminium chloride. Vaseline and a sterile dressing were applied. The patient tolerated the procedure and no complications were noted. The  patient was provided with verbal and written post care instructions.   7. Yellow oily 3mm papule, left forehead. Neoplasm of uncertain behavior. Differential diagnosis includes sebaceous hyperplasia versus other    Punch biopsy:  After discussion of benefits and risks including but not limited to bleeding/bruising, pain/swelling, infection, scar, incomplete removal, nerve damage/numbness, recurrence, and non-diagnostic biopsy, written consent, verbal consent and photographs were obtained. Time-out was performed. The area was cleaned with isopropyl alcohol. 0.5 mL of 1% lidocaine with epinephrine was injected to obtain adequate anesthesia of the lesion on the left forehead. A 2 mm punch biopsy was performed.  6-0 prolene sutures were utilized to approximate the epidermal edges.  White petroleum jelly/VaselineTM and a bandage was applied to the wound.  Explicit verbal and written wound care instructions were provided.  The patient left the Dermatology Clinic in good condition. The patient was counseled to follow up for suture removal in approximately 5-7 days.  8. Stuck on 1.2cm plaque, left neck. Neoplasm of uncertain behavior. Differential diagnosis includes SK versus other. Pt desires removal    Shave biopsy:  After discussion of benefits and risks including but not limited to bleeding/bruising, pain/swelling, infection, scar, incomplete removal, nerve damage/numbness, recurrence, and non-diagnostic biopsy, written consent, verbal consent and photographs were obtained. Time-out was performed. The area was cleaned with isopropyl alcohol. 0.5 mL of 1% lidocaine with epinephrine was injected to obtain adequate anesthesia of the lesion on the left neck. A shave biopsy was performed. Hemostasis was achieved with aluminium chloride. Vaseline and a sterile dressing were applied. The patient tolerated the procedure and no complications were noted. The patient was provided with verbal and written post care instructions.      CC Dr. John on close of this encounter.  Follow-up in for excision and in 1 year(patient preference), earlier pending biopsy, earlier for new or changing lesions.     Staff Involved:  Scribe/Staff    Scribe Disclosure:   I, Rhea Aceves, am serving as a scribe to document services personally performed by Dr. Trudi Santizo, based on data collection and the provider's statements to me.     Provider Disclosure:   I agree with above History, Review of Systems, Physical exam and Plan. I have reviewed the content of the documentation and have edited it as needed. I have personally performed the services documented here and the documentation accurately represents those services and the decisions I have made.     Trudi Santizo MD    Department of Dermatology  [facility]: Phone: [phone], Fax:[phone]  [facility]: Phone: [phone], Fax: [phone]

## 2017-03-10 RX ORDER — LISINOPRIL 5 MG/1
TABLET ORAL
Qty: 180 TABLET | Refills: 0 | Status: SHIPPED | OUTPATIENT
Start: 2017-03-10 | End: 2017-04-10 | Stop reason: SINTOL

## 2017-03-10 NOTE — TELEPHONE ENCOUNTER
Routing refill request to provider for review/approval because:  Drug interaction warning  Kanchan Yip RN

## 2017-03-13 LAB — COPATH REPORT: NORMAL

## 2017-03-14 ENCOUNTER — ALLIED HEALTH/NURSE VISIT (OUTPATIENT)
Dept: NURSING | Facility: CLINIC | Age: 62
End: 2017-03-14
Payer: COMMERCIAL

## 2017-03-14 DIAGNOSIS — Z48.02 VISIT FOR SUTURE REMOVAL: Primary | ICD-10-CM

## 2017-03-14 PROCEDURE — 99207 ZZC NO CHARGE NURSE ONLY: CPT

## 2017-03-14 NOTE — MR AVS SNAPSHOT
After Visit Summary   3/14/2017    Marie Kaiser    MRN: [id number]           Patient Information     Date Of Birth          1955        Visit Information        Provider Department      3/14/2017 10:00 AM NURSE ONLY [facility]        Today's Diagnoses     Visit for suture removal    -  1       Follow-ups after your visit        Your next 10 appointments already scheduled     Apr 13, 2017  1:00 PM CDT   PROCEDURE with Emily Dubois MD   [facility])    [id number] [place] [id number] [phone]            Mar 09, 2018  8:45 AM CST   Return Visit with Trudi Santizo MD   [facility])    [address] [place] [id number] [phone]              Who to contact     If you have questions or need follow up information about today's clinic visit or your schedule please contact [facility] directly at [phone].  Normal or non-critical lab and imaging results will be communicated to you by Packetmotionhart, letter or phone within 4 business days after the clinic has received the results. If you do not hear from us within 7 days, please contact the clinic through Uplogixt or phone. If you have a critical or abnormal lab result, we will notify you by phone as soon as possible.  Submit refill requests through Sitrion or call your pharmacy and they will forward the refill request to us. Please allow 3 business days for your refill to be completed.          Additional Information About Your Visit        Packetmotionhart Information     Sitrion gives you secure access to your electronic health record. If you see a primary care provider, you can also send messages to your care team and make appointments. If you have questions, please call your primary care clinic.  If you do not have a primary care provider, please call [phone] and they will  assist you.      Ch[employer] is an electronic gateway that provides easy, online access to your medical records. With [employer], you can request a clinic appointment, read your test results, renew a prescription or communicate with your care team.     To access your existing account, please contact your [facility] or call [phone] for assistance.        Care EveryWhere ID     This is your Care EveryWhere ID. This could be used by other organizations to access your [place] medical records  [id number]         Blood Pressure from Last 3 Encounters:   02/20/17 136/88   01/16/17 154/76   08/10/16 164/85    Weight from Last 3 Encounters:   02/20/17 105.7 kg (233 lb)   01/16/17 104.8 kg (231 lb)   08/10/16 103.9 kg (229 lb)              Today, you had the following     No orders found for display       Primary Care Provider Office Phone # Fax #    Rosalinda Muse [phone]       [address] [address]        Thank you!     Thank you for choosing [facility]  for your care. Our goal is always to provide you with excellent care. Hearing back from our patients is one way we can continue to improve our services. Please take a few minutes to complete the written survey that you may receive in the mail after your visit with us. Thank you!             Your Updated Medication List - Protect others around you: Learn how to safely use, store and throw away your medicines at www.disposemymeds.org.          This list is accurate as of: 3/14/17 10:26 AM.  Always use your most recent med list.                   Brand Name Dispense Instructions for use    amoxicillin 500 MG capsule    AMOXIL    30 capsule    Take 1 capsule (500 mg) by mouth 3 times daily       clotrimazole 1 % cream    LOTRIMIN    15 g    Apply topically 2 times daily       EPINEPHrine 0.3 MG/0.3ML injection     0.6 mL    Inject 0.3 mLs (0.3 mg) into the muscle once  as needed for anaphylaxis       lisinopril 5 MG tablet    PRINIVIL/ZESTRIL    180 tablet    TAKE 1 TABLET (5 MG) BY MOUTH 2 TIMES DAILY       loratadine 10 MG tablet    CLARITIN    90 tablet    Take 1 tablet (10 mg) by mouth daily       mometasone 0.1 % cream    ELOCON    45 g    Apply twice daily for up to ten days

## 2017-03-14 NOTE — NURSING NOTE
Dermatology Suture Removal Record  S: Marie presents to the clinic today for  removal of sutures and suture.  The patient has had the sutures and suture in place for 5 days.  Punch biopsy done by Dr. Santizo 3/09/17.  There has been no history of infection or drainage.  Pt stated she had not received her biopsy results.  Chart reviewed.  Pathology results back, Dr. Santizo has reviewed results and Result Note read back to pt.  Pt verbalized understanding.     O: 1 sutures and suture are seen located on the left forehead.  The wound is healing well with no signs of infection.    A: Suture removal.    P:  All sutures were easily removed today.  Routine wound care discussed.  The patient will follow up as needed.        Marie Kaiser comes into clinic today at the request of Dr. Santizo for suture removal.    This service provided today was under the direct supervision of Dr. Ballard, who was available if needed.    Fiorella Guevara LPN

## 2017-03-23 DIAGNOSIS — G47.33 OSA (OBSTRUCTIVE SLEEP APNEA): Primary | ICD-10-CM

## 2017-03-23 NOTE — PROGRESS NOTES
Pt would like to be setup on CPAP. Positional therapy is not working. Dental appliance is too expensive. Dentist informed her that it is not a good option due to having TMJ.

## 2017-03-28 ENCOUNTER — DOCUMENTATION ONLY (OUTPATIENT)
Dept: SLEEP MEDICINE | Facility: CLINIC | Age: 62
End: 2017-03-28
Payer: COMMERCIAL

## 2017-03-28 DIAGNOSIS — I10 ESSENTIAL HYPERTENSION: ICD-10-CM

## 2017-03-28 DIAGNOSIS — G47.33 OSA (OBSTRUCTIVE SLEEP APNEA): Primary | ICD-10-CM

## 2017-03-28 PROCEDURE — 99207 ZZC NO BILLABLE SERVICE THIS VISIT: CPT

## 2017-03-28 NOTE — PROGRESS NOTES
Patient was offered choice of vendor and chose [facility].  Patient Marie Kaiser was set up at [facility] on March 28, 2017. Patient received a [employer] DreamStation Auto. Pressures were set at 5-15 cm H2O.   Patient s ramp is 5 cm H2O for Off and FLEX/EPR is A Flex, 2.  Patient received a Resmed Mask name: Airfit N20  Nasal mask Size Medium, heated tubing and heated humidifier.  Patient is enrolled in the STM Program and does need to meet compliance. Patient has not schedule a follow up with RADHA Murray.    Lena Jones

## 2017-03-31 ENCOUNTER — DOCUMENTATION ONLY (OUTPATIENT)
Dept: SLEEP MEDICINE | Facility: CLINIC | Age: 62
End: 2017-03-31

## 2017-03-31 NOTE — PROGRESS NOTES
3 DAY STM VISIT    Patient contacted for 3 day STM visit  Subjective measures:  Pt states that things are going pretty well.  She is getting used to it.      Current settings: 5-15cm H2O       Assessment:  No data available.  Order resent  Action plan: Pt to have f/u 14 day STM visit.

## 2017-04-06 DIAGNOSIS — I10 ESSENTIAL HYPERTENSION WITH GOAL BLOOD PRESSURE LESS THAN 140/90: Chronic | ICD-10-CM

## 2017-04-07 RX ORDER — LISINOPRIL 5 MG/1
TABLET ORAL
Qty: 180 TABLET | Refills: 0 | OUTPATIENT
Start: 2017-04-07

## 2017-04-07 NOTE — TELEPHONE ENCOUNTER
The following prescription was sent to [facility]:    lisinopril (PRINIVIL/ZESTRIL) 5 MG tablet 180 tablet 0 3/10/2017  No   Sig: TAKE 1 TABLET (5 MG) BY MOUTH 2 TIMES DAILY   Class: E-Prescribe   Order: [id number]   E-Prescribing Status: Receipt confirmed by pharmacy (3/10/2017  1:21 PM CST)     Request denied.  Too soon to fill.  Kanchan Yip RN

## 2017-04-10 ENCOUNTER — TELEPHONE (OUTPATIENT)
Dept: FAMILY MEDICINE | Facility: CLINIC | Age: 62
End: 2017-04-10

## 2017-04-10 DIAGNOSIS — I10 ESSENTIAL HYPERTENSION WITH GOAL BLOOD PRESSURE LESS THAN 140/90: Primary | Chronic | ICD-10-CM

## 2017-04-10 RX ORDER — AMLODIPINE BESYLATE 2.5 MG/1
2.5 TABLET ORAL DAILY
Qty: 30 TABLET | Refills: 1 | Status: SHIPPED | OUTPATIENT
Start: 2017-04-10 | End: 2017-05-25 | Stop reason: SINTOL

## 2017-04-10 NOTE — TELEPHONE ENCOUNTER
Please call patient re: I would recommend we try Amlodipine 2.5 mg daily.  This is a low dose of this medication as well.  She has not taken it previously.  Follow up BP check in 2-3 weeks recommended with me if having any side effects or concerns - if tolerated well MA BP check okay.      PSK

## 2017-04-10 NOTE — TELEPHONE ENCOUNTER
Spoke with patient to advise of provider notation below. Patient verbalized understanding. Pt is unsure if her insurance will be in place after this month because she got a letter from [employer] that the plan is changing. She has another concern of nasal symptoms  - claritin used to work but now by nighttime having blockage on one side of nose.    Recommended trying a saline spray multiple times daily to nose (pt does not want to try OTC nasal steroid spray) and will discuss at OV follow up for both issues.  Timoteo Gonzalez RN

## 2017-04-10 NOTE — TELEPHONE ENCOUNTER
Reason for Call:  Medication or medication refill:    Do you use a [facility]?  Name of the pharmacy and phone number for the current request:  [id number] [place] [facility] [place] [facility] [address]    Name of the medication requested: BP medication is making her cough and hard to sleep because of the c-pap machine.  and needs a new BP medication    Other request:     Can we leave a detailed message on this number? YES    Phone number patient can be reached at: Home number on file [phone] (home)    Best Time: any    Call taken on 4/10/2017 at 9:36 AM by Catie Coburn

## 2017-04-10 NOTE — TELEPHONE ENCOUNTER
Returned call to patient -      Patient states she has been on lisinopril again for about 1 year and the cough started gradually and has gotten worse. Constant dry cough.  Started using a Cpap 2 weeks ago and could not tolerate with the cough.    Pharmacist suggested she try stopping lisinopril so she has been off it for 1 week.  The cough is now gone but BP is 167/90.    Pt would like something new sent but states she is very sensitive to medications so needs to find something she has not tried before and will tolerate.    Routing to provider to review and advise.    Timoteo Gonzalez RN

## 2017-04-12 ENCOUNTER — DOCUMENTATION ONLY (OUTPATIENT)
Dept: SLEEP MEDICINE | Facility: CLINIC | Age: 62
End: 2017-04-12

## 2017-04-12 NOTE — PROGRESS NOTES
14 DAY STM VISIT    Subjective measures:  Pt struggling with dryness.  She is having some issues with the smell coming from the mask.     Assessment: no data available.  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    Action plan: Pt to have 30 day STM visit. Pt instructed on checking the connections of the humidity chamber.     Device settings:    5-15 cm h20       Objective measures: 14 day rolling measure-no data.           Objective measure goal  Compliance   Goal >70%  Leak   Goal < 10%  AHI  Goal < 5  Usage  Goal >240

## 2017-04-13 ENCOUNTER — OFFICE VISIT (OUTPATIENT)
Dept: DERMATOLOGY | Facility: CLINIC | Age: 62
End: 2017-04-13
Payer: COMMERCIAL

## 2017-04-13 VITALS — OXYGEN SATURATION: 97 % | HEART RATE: 81 BPM | SYSTOLIC BLOOD PRESSURE: 167 MMHG | DIASTOLIC BLOOD PRESSURE: 93 MMHG

## 2017-04-13 DIAGNOSIS — R22.9 SUBCUTANEOUS NODULE: Primary | ICD-10-CM

## 2017-04-13 PROCEDURE — 12031 INTMD RPR S/A/T/EXT 2.5 CM/<: CPT | Mod: GC | Performed by: DERMATOLOGY

## 2017-04-13 PROCEDURE — 11421 EXC H-F-NK-SP B9+MARG 0.6-1: CPT | Mod: GC | Performed by: DERMATOLOGY

## 2017-04-13 PROCEDURE — 88304 TISSUE EXAM BY PATHOLOGIST: CPT | Performed by: DERMATOLOGY

## 2017-04-13 NOTE — PATIENT INSTRUCTIONS
For sunscreen:  Recommend CeraVe Day Lotion      Excision Wound Care Instructions  I will experience scar, altered skin color, bleeding, swelling, pain, crusting and redness. I may experience altered sensation. Risks are excessive bleeding, infection, muscle weakness, thick (hypertrophic or keloidal) scar, and recurrence,. A second procedure may be recommended to obtain the best cosmetic or functional result.  Possible complications of any surgical procedure are bleeding, infection, scarring, alteration in skin color and sensation, muscle weakness in the area, wound dehiscence or seperation, or recurrence of the lesion or disease. On occasion, after healing, a secondary procedure or revision may be recommended in order to obtain the best cosmetic or functional result.   After your surgery, a pressure bandage will be placed over the area that has sutures. This will help prevent bleeding. Please follow these instructions until you come back to clinic for suture removal in two weeks, as they will help you to prevent complications as your wound heals.  For the First 48 hours After Surgery:  1. Leave the pressure bandage on and keep it dry. If it should come loose, you may retape it, but do not take it off.  2. Relax and take it easy. Do not do any vigorous exercise, heavy lifting, or bending forward. This could cause the wound to bleed.  3. Post-operative pain is usually mild. You may take plain or extra strength Tylenol every 4 hours as needed (do not take more than 4,000mg in one day). Do not take any medicine that contains aspirin, ibuprofen or motrin unless you have been recommended these by a doctor.  Avoid alcohol and vitamin E as these may increase your tendency to bleed.  4. You may put an ice pack around the bandaged area for 20 minutes every 2-3 hours. This may help reduce swelling, bruising, and pain. Make sure the ice pack is waterproof so that the pressure bandage does not get wet.   5. You may see a small  amount of drainage or blood on your pressure bandage. This is normal. However, if drainage or bleeding continues or saturates the bandage, you will need to apply firm pressure over the bandage with a washcloth for 15 minutes. If bleeding continues after applying pressure for 15 minutes then go to the nearest emergency room.  48 Hours After Surgery  Carefully remove the bandage and start daily wound care and dressing changes. You may also now shower and get the wound wet. Wash wound with a mild soap and water.  Use caution when washing the wound. Be gentle and do not let the forceful shower stream hit the wound directly.  PAT dry.  Daily Wound Care:  1. Wash wound with a mild soap and water.  Use caution when washing the wound, be gentle and do not let the forceful shower stream hit the wound directly.  2. PAT DRY.  3. Apply Vaseline (from a new container or tube) over the suture line with a Q-tip. It is very important to keep the wound continuously moist, as wounds heal best in a moist environment.  4.  Keep the site covered until sutures are removed, you can cover it with a Telfa (non-stick) dressing and tape or a band-aid.    5. If you are unable to keep wound covered, you must apply Vaseline every 2 - 3 hours (while awake) to ensure it is being kept moist for optimal healing. A dressing overnight is recommended to keep the area moist.   Call Us If:  1. You have pain that is not controlled with Tylenol.  2. You have signs or symptoms of an infection, such as: fever over 100 degrees F, redness, warmth, or foul-smelling or yellow/creamy drainage from the wound.  Who should I call with questions?    [facility]: [phone]     [facility]: [phone]    For urgent needs outside of business hours call the [facility] at [phone] and ask for the dermatology resident on call

## 2017-04-13 NOTE — LETTER
4/13/2017      RE: Marie Kaiser  [address] [place] [id number]       DERMATOLOGY EXCISION PROCEDURE NOTE    Dermatology Problem List:  1. Rash, bilateral hands  -Previous Tx: OTC anti-fungal, OTC topicals without improvement, clotrimazole (initiated 2/202017) without improvement, mometasone with impovement  2. Nodule, left vertex scalp  -excised 04/13/2017  3. Hx benign lesion biopsy  -Left buttock: intradermal melanocytic nevus  -Left cheek: compou    NAME OF PROCEDURE: Excision intermediate layered linear closure  Staff surgeon: Emily Dubois MD  Resident: Watson Rabago MD  Scrub Nurse: Mariangel Sevilla LPN    PRE-OPERATIVE DIAGNOSIS:  Subcutaneous nodule  POST-OPERATIVE DIAGNOSIS: Same   FINAL EXCISION SIZE(DEFECT SIZE): 0.5 x 1.0 cm, with 0 mm margin   FINAL REPAIR LENGTH: 1.0 cm     INDICATIONS: This patient presented with a 0.5 x 1 cm cyst of the left vertex scalp. Excision was indicated. We discussed the principles of treatment and most likely complications including scarring, bleeding, infection, incomplete excision, wound dehiscence, pain, nerve damage, and recurrence. Informed consent was obtained and the patient underwent the procedure as follows:    PROCEDURE: The patient was taken to the operative suite. Time-out was performed.  The treatment area was anesthetized with 1% lidocaine with epinephrine. The area was prepped with Chlorhexidine and rinsed with sterile saline and draped with sterile towels. The lesion was delineated and excised down to subcutaneous fat in a fusiform manner. Hemostasis was obtained by electrocoagulation.     REPAIR: An intermediate layered linear closure was selected as the procedure which would maximally preserve both function and cosmesis.    After the excision of the tumor, the area was carefully undermined. Hemostasis was obtained with direct pressure.  Closure was oriented so that the wound was in the patient's natural skin tension lines. The  subcutaneous and dermal layers were then closed with 4-0 vicryl sutures. The epidermis was then carefully approximated along the length of the wound using 4-0 prolene vertical mattress sutures.     The final wound length was 1.0 cm. A total of 2.0 ml of anesthesia was administered for all surgical sites. Estimated blood loss was less than 10 ml for all surgical sites. A sterile pressure dressing was applied and wound care instructions, with a written handout, were given. The [facility] alert and ambulatory.    Follow-up in 2 weeks for suture removal.     Attending physician Emily Dubois MD was immediately available for the entire surgery and was physicially present for the key portions of the procedure.    Anatomic Pathology Results: pending    Clinical Follow-Up: as per Dr. Santizo    Staff Involved:  Resident(Hima)/Scribe/Staff  I, Hamlet Perrin, am serving as a scribe to document services personally performed by Skinny Dubois MD, and Watson Rabago MD, ELDER, based on data collection and the providers' statements to me.      Provider Disclosure:   I agree with above History, Review of Systems, Physical exam and Plan. I have reviewed the content of the documentation and have edited it as needed. I have personally performed the services documented here and the documentation accurately represents those services and the decisions I have made.     Emily Dubois MD    Department of Dermatology  [facility]

## 2017-04-13 NOTE — LETTER
"    Patient:  Marie Kaiser  :   1955  MRN:     [id number]        Ms.Ashley JUDITH Kaiser  [address] [place] [id number]        2017    Dear ,    We are writing to inform you of your test results that show a harmless scalp cyst, no additional treatment necessary. If you have further questions or concerns, please contact the clinic at [phone].       Sincerely,     Elysia Dubois MD      Dermatology Department of Dermatology   [facility]     Resulted Orders   Surgical pathology exam   Result Value Ref Range    Copath Report       Patient Name: MARIE KAISER  MR#: [id number]  Specimen #: [id number]  Collected: 2017  Received: 2017  Reported: 2017 17:32  Ordering Phy(s): ELYSIA DUBOIS    For improved result formatting, select 'View Enhanced Report Format'  under Linked Documents section.    SPECIMEN(S):  Cyst, left vertex scalp    FINAL DIAGNOSIS:  Skin, cyst, vertex scalp, left:  - Pilar cyst - (see description)    I have personally reviewed all specimens and or slides, including the  listed special stains, and used them with my medical judgement to  determine the final diagnosis.    Electronically signed out by:    Jovan Alonso M.D., [facility]    CLINICAL HISTORY:  The patient is a 61-year-old female.    GROSS:  The specimen is received in formalin with proper patient's  identification, labeled \"cyst of left vertex scalp\".  The specimen  consists of a 0.8 x 0.6 x 0.6 cm gray-white soft tissue margin nodule.  Sectioning reveals a cystic lesion filled with tan-brown material.  E ntirely submitted in one cassette. (Dictated by: Theo Chamberlain 2017  11:29 AM)    MICROSCOPIC:  The specimen consists of a dilated cystic cavity filled with densely  packed compact orthokeratosis and lined by keratinizing squamous  epithelium without an identifiable granular " layer.    CPT Codes:  A: 91832-CT8.P, 93349-XK6.T    TESTING LAB LOCATION:  [facility], [address]   [id number] [phone]    COLLECTION SITE:  Client: [facility]  Location: MICHAEL JAZMYN)

## 2017-04-13 NOTE — NURSING NOTE
Excision Intake  BP (!) 167/93  Pulse 81  SpO2 97%    artificial heart valve: No    artificial joint within the last 3 years: No    heart stent in the last 3 months: No    pacemaker: No    defibrillator: No    nerve/brain stimulator: No    bleeding disorder: No    coumadin use: No    heart valve disease: No    site location lower limb or groin: No    hepatitis B/C or HIV: No    smoker: No    Iodine allergy: No     Dermatology Rooming Note    Marie Kaiser's goals for this visit include:   Chief Complaint   Patient presents with     Procedure     Excision left vertex scalp       Is a scribe okay for this visit:YES    Are records needed for this visit(If yes, obtain release of information): No     Vitals: BP (!) 167/93  Pulse 81  SpO2 97%    Referring Provider:  No referring provider defined for this encounter.    Mariangel Sevilla LPN

## 2017-04-13 NOTE — PROGRESS NOTES
DERMATOLOGY EXCISION PROCEDURE NOTE    Dermatology Problem List:  1. Rash, bilateral hands  -Previous Tx: OTC anti-fungal, OTC topicals without improvement, clotrimazole (initiated 2/202017) without improvement, mometasone with impovement  2. Nodule, left vertex scalp  -excised 04/13/2017  3. Hx benign lesion biopsy  -Left buttock: intradermal melanocytic nevus  -Left cheek: compou    NAME OF PROCEDURE: Excision intermediate layered linear closure  Staff surgeon: Emily Dubois MD  Resident: Watson Rabago MD  Scrub Nurse: Mariangel Sevilla LPN    PRE-OPERATIVE DIAGNOSIS:  Subcutaneous nodule  POST-OPERATIVE DIAGNOSIS: Same   FINAL EXCISION SIZE(DEFECT SIZE): 0.5 x 1.0 cm, with 0 mm margin   FINAL REPAIR LENGTH: 1.0 cm     INDICATIONS: This patient presented with a 0.5 x 1 cm cyst of the left vertex scalp. Excision was indicated. We discussed the principles of treatment and most likely complications including scarring, bleeding, infection, incomplete excision, wound dehiscence, pain, nerve damage, and recurrence. Informed consent was obtained and the patient underwent the procedure as follows:    PROCEDURE: The patient was taken to the operative suite. Time-out was performed.  The treatment area was anesthetized with 1% lidocaine with epinephrine. The area was prepped with Chlorhexidine and rinsed with sterile saline and draped with sterile towels. The lesion was delineated and excised down to subcutaneous fat in a fusiform manner. Hemostasis was obtained by electrocoagulation.     REPAIR: An intermediate layered linear closure was selected as the procedure which would maximally preserve both function and cosmesis.    After the excision of the tumor, the area was carefully undermined. Hemostasis was obtained with direct pressure.  Closure was oriented so that the wound was in the patient's natural skin tension lines. The subcutaneous and dermal layers were then closed with 4-0 vicryl sutures. The epidermis  was then carefully approximated along the length of the wound using 4-0 prolene vertical mattress sutures.     The final wound length was 1.0 cm. A total of 2.0 ml of anesthesia was administered for all surgical sites. Estimated blood loss was less than 10 ml for all surgical sites. A sterile pressure dressing was applied and wound care instructions, with a written handout, were given. The patient was [facility] from the [facility] alert and ambulatory.    Follow-up in 2 weeks for suture removal.     Attending physician Emily Dubois MD was immediately available for the entire surgery and was physicially present for the key portions of the procedure.    Anatomic Pathology Results: pending    Clinical Follow-Up: as per Dr. Santizo    Staff Involved:  Resident(Hima)/Scribe/Staff  I, Hamlet Perrin, am serving as a scribe to document services personally performed by Skinny Dubois MD, and Watson Rabago MD, ELDER, based on data collection and the providers' statements to me.      Provider Disclosure:   I agree with above History, Review of Systems, Physical exam and Plan. I have reviewed the content of the documentation and have edited it as needed. I have personally performed the services documented here and the documentation accurately represents those services and the decisions I have made.     Emily Dubois MD    Department of Dermatology  [facility]

## 2017-04-13 NOTE — MR AVS SNAPSHOT
After Visit Summary   4/13/2017    Marie Kaiser    MRN: [id number]           Patient Information     Date Of Birth          1955        Visit Information        Provider Department      4/13/2017 1:00 PM Emily Dubois MD [facility]        Today's Diagnoses     Subcutaneous nodule    -  1      Care Instructions    For sunscreen:  Recommend CeraVe Day Lotion      Excision Wound Care Instructions  I will experience scar, altered skin color, bleeding, swelling, pain, crusting and redness. I may experience altered sensation. Risks are excessive bleeding, infection, muscle weakness, thick (hypertrophic or keloidal) scar, and recurrence,. A second procedure may be recommended to obtain the best cosmetic or functional result.  Possible complications of any surgical procedure are bleeding, infection, scarring, alteration in skin color and sensation, muscle weakness in the area, wound dehiscence or seperation, or recurrence of the lesion or disease. On occasion, after healing, a secondary procedure or revision may be recommended in order to obtain the best cosmetic or functional result.   After your surgery, a pressure bandage will be placed over the area that has sutures. This will help prevent bleeding. Please follow these instructions until you come back to clinic for suture removal in two weeks, as they will help you to prevent complications as your wound heals.  For the First 48 hours After Surgery:  1. Leave the pressure bandage on and keep it dry. If it should come loose, you may retape it, but do not take it off.  2. Relax and take it easy. Do not do any vigorous exercise, heavy lifting, or bending forward. This could cause the wound to bleed.  3. Post-operative pain is usually mild. You may take plain or extra strength Tylenol every 4 hours as needed (do not take more than 4,000mg in one day). Do not take any medicine that contains aspirin, ibuprofen or motrin  unless you have been recommended these by a doctor.  Avoid alcohol and vitamin E as these may increase your tendency to bleed.  4. You may put an ice pack around the bandaged area for 20 minutes every 2-3 hours. This may help reduce swelling, bruising, and pain. Make sure the ice pack is waterproof so that the pressure bandage does not get wet.   5. You may see a small amount of drainage or blood on your pressure bandage. This is normal. However, if drainage or bleeding continues or saturates the bandage, you will need to apply firm pressure over the bandage with a washcloth for 15 minutes. If bleeding continues after applying pressure for 15 minutes then go to the nearest emergency room.  48 Hours After Surgery  Carefully remove the bandage and start daily wound care and dressing changes. You may also now shower and get the wound wet. Wash wound with a mild soap and water.  Use caution when washing the wound. Be gentle and do not let the forceful shower stream hit the wound directly.  PAT dry.  Daily Wound Care:  1. Wash wound with a mild soap and water.  Use caution when washing the wound, be gentle and do not let the forceful shower stream hit the wound directly.  2. PAT DRY.  3. Apply Vaseline (from a new container or tube) over the suture line with a Q-tip. It is very important to keep the wound continuously moist, as wounds heal best in a moist environment.  4.  Keep the site covered until sutures are removed, you can cover it with a Telfa (non-stick) dressing and tape or a band-aid.    5. If you are unable to keep wound covered, you must apply Vaseline every 2 - 3 hours (while awake) to ensure it is being kept moist for optimal healing. A dressing overnight is recommended to keep the area moist.   Call Us If:  1. You have pain that is not controlled with Tylenol.  2. You have signs or symptoms of an infection, such as: fever over 100 degrees F, redness, warmth, or foul-smelling or yellow/creamy drainage from  the wound.  Who should I call with questions?    [facility]: [phone]     [facility]: [phone]    For urgent needs outside of business hours call the [facility] at [phone] and ask for the dermatology resident on call            Follow-ups after your visit        Your next 10 appointments already scheduled     Apr 24, 2017  1:40 PM CDT   [facility] Muse [facility]    [address] [place] [id number] [phone]           Bring a current list of meds and any records pertaining to this visit.  For Physicals, please bring immunization records and any forms needing to be filled out.  Please arrive 10 minutes early to complete paperwork.            Apr 27, 2017  1:30 PM CDT   Nurse Only with NURSE ONLY [facility]    [address] [place] [id number] [phone]            Mar 09, 2018  8:45 AM CST   [facility]    [address] [place] [phone] [phone]              Who to contact     If you have questions or need follow up information about today's clinic visit or your schedule please contact [facility] directly at [phone].  Normal or non-critical lab and imaging results will be communicated to you by MyChart, letter or phone within 4 business days after the clinic has received the results. If you do not hear from us within 7 days, please contact the clinic through MyChart or phone. If you have a critical or abnormal lab result, we will notify you by phone as soon as possible.  Submit refill requests through [employer] or call your pharmacy and they will forward the refill request to us. Please allow 3 business days for your refill to be completed.           Additional Information About Your Visit        [employer] Information     [employer] gives you secure access to your electronic health record. If you see a primary care provider, you can also send messages to your care team and make appointments. If you have questions, please call your primary care clinic.  If you do not have a primary care provider, please call [phone] and they will assist you.      [employer] is an electronic gateway that provides easy, online access to your medical records. With [employer], you can request a clinic appointment, read your test results, renew a prescription or communicate with your care team.     To access your existing account, please contact your [facility] or call [phone] for assistance.        Care EveryWhere ID     This is your Care EveryWhere ID. This could be used by other organizations to access your [facility] medical records  [id number]        Your Vitals Were     Pulse Pulse Oximetry                81 97%           Blood Pressure from Last 3 Encounters:   04/13/17 (!) 167/93   02/20/17 136/88   01/16/17 154/76    Weight from Last 3 Encounters:   02/20/17 105.7 kg (233 lb)   01/16/17 104.8 kg (231 lb)   08/10/16 103.9 kg (229 lb)              Today, you had the following     No orders found for display       Primary Care Provider Office Phone # Fax #    Rosalinda Muse [phone]       [facility] [address] [place] [address] [address]  [facility] [address]        Thank you!     Thank you for choosing [facility]  for your care. Our goal is always to provide you with excellent care. Hearing back from our patients is one way we can continue to improve our services. Please take a few minutes to complete the written survey that you may receive in the mail after your visit with us. Thank you!             Your Updated Medication List - Protect others around you: Learn how to safely use, store and throw away your  medicines at www.disposemymeds.org.          This list is accurate as of: 4/13/17  1:50 PM.  Always use your most recent med list.                   Brand Name Dispense Instructions for use    amLODIPine 2.5 MG tablet    NORVASC    30 tablet    Take 1 tablet (2.5 mg) by mouth daily       clotrimazole 1 % cream    LOTRIMIN    15 g    Apply topically 2 times daily       EPINEPHrine 0.3 MG/0.3ML injection     0.6 mL    Inject 0.3 mLs (0.3 mg) into the muscle once as needed for anaphylaxis       loratadine 10 MG tablet    CLARITIN    90 tablet    Take 1 tablet (10 mg) by mouth daily       mometasone 0.1 % cream    ELOCON    45 g    Apply twice daily for up to ten days       order for DME      Equipment ordered: Respironics Auto PAP Mask type: Nasal Settings: 5-15 cm

## 2017-04-17 LAB — COPATH REPORT: NORMAL

## 2017-04-24 ENCOUNTER — OFFICE VISIT (OUTPATIENT)
Dept: FAMILY MEDICINE | Facility: CLINIC | Age: 62
End: 2017-04-24
Payer: COMMERCIAL

## 2017-04-24 VITALS
HEART RATE: 80 BPM | BODY MASS INDEX: 34.44 KG/M2 | TEMPERATURE: 98.1 F | SYSTOLIC BLOOD PRESSURE: 132 MMHG | OXYGEN SATURATION: 96 % | DIASTOLIC BLOOD PRESSURE: 72 MMHG | WEIGHT: 231.5 LBS

## 2017-04-24 DIAGNOSIS — K21.9 GASTROESOPHAGEAL REFLUX DISEASE WITHOUT ESOPHAGITIS: ICD-10-CM

## 2017-04-24 DIAGNOSIS — I10 HYPERTENSION GOAL BP (BLOOD PRESSURE) < 140/90: Primary | Chronic | ICD-10-CM

## 2017-04-24 DIAGNOSIS — R09.81 NASAL CONGESTION: ICD-10-CM

## 2017-04-24 PROCEDURE — 99214 OFFICE O/P EST MOD 30 MIN: CPT | Performed by: FAMILY MEDICINE

## 2017-04-24 RX ORDER — FEXOFENADINE HCL 180 MG/1
180 TABLET ORAL DAILY
Qty: 30 TABLET | Refills: 1 | Status: SHIPPED | OUTPATIENT
Start: 2017-04-24 | End: 2018-05-14

## 2017-04-24 ASSESSMENT — PATIENT HEALTH QUESTIONNAIRE - PHQ9: 5. POOR APPETITE OR OVEREATING: NOT AT ALL

## 2017-04-24 ASSESSMENT — ANXIETY QUESTIONNAIRES
IF YOU CHECKED OFF ANY PROBLEMS ON THIS QUESTIONNAIRE, HOW DIFFICULT HAVE THESE PROBLEMS MADE IT FOR YOU TO DO YOUR WORK, TAKE CARE OF THINGS AT HOME, OR GET ALONG WITH OTHER PEOPLE: NOT DIFFICULT AT ALL
GAD7 TOTAL SCORE: 0
6. BECOMING EASILY ANNOYED OR IRRITABLE: NOT AT ALL
3. WORRYING TOO MUCH ABOUT DIFFERENT THINGS: NOT AT ALL
5. BEING SO RESTLESS THAT IT IS HARD TO SIT STILL: NOT AT ALL
1. FEELING NERVOUS, ANXIOUS, OR ON EDGE: NOT AT ALL
2. NOT BEING ABLE TO STOP OR CONTROL WORRYING: NOT AT ALL
7. FEELING AFRAID AS IF SOMETHING AWFUL MIGHT HAPPEN: NOT AT ALL

## 2017-04-24 NOTE — MR AVS SNAPSHOT
After Visit Summary   4/24/2017    Marie Kaiser    MRN: [id number]           Patient Information     Date Of Birth          1955        Visit Information        Provider Department      4/24/2017 1:40 PM Rosalinda Muse MD [facility]        Today's Diagnoses     Hypertension goal BP (blood pressure) < 140/90    -  1    Nasal congestion          Care Instructions    For the BP - decrease the Amlodipine to 1.25 mg (1/2 pill) daily.   Send me a [employer] message in 1-2 weeks.  If BP is not controlled or side effects continue - change to a different medication may be needed.    For the allergy symptoms - you can use claritin (loratidine) 10 mg OR zyrtec (cetrizine) 10 mg OR allegra (Fexofenadine) 180 mg. Each of these are dosed once daily.  I am sending allegra in today.              Follow-ups after your visit        Your next 10 appointments already scheduled     Apr 27, 2017  4:00 PM CDT   Nurse Only with NURSE ONLY MG DERM   [facility])    [address] [place] [address]   [phone]            May 01, 2017 10:30 AM CDT   SLEEP DME MASK FITTING with BK SC DME   [facility] ([facility])    [address] [place] [address]   [phone]            May 19, 2017 10:00 AM CDT   Return Sleep Patient with RADHA Blankenship   [facility] ([facility])    [address] [place] [place] [address]   [phone]            Mar 09, 2018  8:45 AM CST   Return Visit with Trudi Santizo MD   [facility])    [address] [place] [address]   [phone]              Who to contact     If you have questions or need follow up information about today's clinic visit or your schedule please contact [facility] directly at  [phone].  Normal or non-critical lab and imaging results will be communicated to you by FastCallhart, letter or phone within 4 business days after the clinic has received the results. If you do not hear from us within 7 days, please contact the clinic through [employer] Sh[employer] or phone. If you have a critical or abnormal lab result, we will notify you by phone as soon as possible.  Submit refill requests through [employer] or call your pharmacy and they will forward the refill request to us. Please allow 3 business days for your refill to be completed.          Additional Information About Your Visit        FastCallhart Information     [employer] gives you secure access to your electronic health record. If you see a primary care provider, you can also send messages to your care team and make appointments. If you have questions, please call your primary care clinic.  If you do not have a primary care provider, please call [phone] and they will assist you.        Care EveryWhere ID     This is your Care EveryWhere ID. This could be used by other organizations to access your [place] medical records  [id number]        Your Vitals Were     Pulse Temperature Pulse Oximetry BMI (Body Mass Index)          80 98.1  F (36.7  C) (Oral) 96% 34.44 kg/m2         Blood Pressure from Last 3 Encounters:   04/24/17 132/72   04/13/17 (!) 167/93   02/20/17 136/88    Weight from Last 3 Encounters:   04/24/17 105 kg (231 lb 8 oz)   02/20/17 105.7 kg (233 lb)   01/16/17 104.8 kg (231 lb)              Today, you had the following     No orders found for display         Today's Medication Changes          These changes are accurate as of: 4/24/17  2:25 PM.  If you have any questions, ask your nurse or doctor.               Start taking these medicines.        Dose/Directions    fexofenadine 180 MG tablet   Commonly known as:  ALLEGRA   Used for:  Nasal congestion        Dose:  180 mg   Take 1 tablet (180 mg) by mouth daily   Quantity:  30 tablet    Refills:  1            Where to get your medicines      These medications were sent to [facility] [address] [facility] [employer] [facility] [place] - [address]  [address] [address]     Phone:  [phone]     fexofenadine 180 MG tablet                Primary Care Provider Office Phone # Fax #    Rosalinda Muse [phone]       [facility] [address]  [facility] [address]        Thank you!     Thank you for choosing [facility]  for your care. Our goal is always to provide you with excellent care. Hearing back from our patients is one way we can continue to improve our services. Please take a few minutes to complete the written survey that you may receive in the mail after your visit with us. Thank you!             Your Updated Medication List - Protect others around you: Learn how to safely use, store and throw away your medicines at www.disposemymeds.org.          This list is accurate as of: 4/24/17  2:25 PM.  Always use your most recent med list.                   Brand Name Dispense Instructions for use    amLODIPine 2.5 MG tablet    NORVASC    30 tablet    Take 1 tablet (2.5 mg) by mouth daily       clotrimazole 1 % cream    LOTRIMIN    15 g    Apply topically 2 times daily       EPINEPHrine 0.3 MG/0.3ML injection     0.6 mL    Inject 0.3 mLs (0.3 mg) into the muscle once as needed for anaphylaxis       fexofenadine 180 MG tablet    ALLEGRA    30 tablet    Take 1 tablet (180 mg) by mouth daily       loratadine 10 MG tablet    CLARITIN    90 tablet    Take 1 tablet (10 mg) by mouth daily       mometasone 0.1 % cream    ELOCON    45 g    Apply twice daily for up to ten days       order for DME      Equipment ordered: Respironics Auto PAP Mask type: Nasal Settings: 5-15 cm

## 2017-04-24 NOTE — PROGRESS NOTES
SUBJECTIVE:                                                    Marie Kaiser is a 61 year old female who presents to clinic today for the following health issues:      Hypertension Follow-up      Outpatient blood pressures are being checked at home.  Results are 128/65.    Low Salt Diet: no added salt       Amount of exercise or physical activity: 6-7 days/week for an average of 30-45 minutes    Problems taking medications regularly: No    Medication side effects: medication makes pt hands swell and she has had stiff joint pains. Extreme fatigue.    Diet: low salt    Has been taking Claritin all winter and sinus nasal sx have not gotten better or worse. Pt concerned if she has become immune to medication and should change medication.  Sores in nose.    Has noted a few times of throat closing sensation - worse if having heartburn the previous day.  Noted to react the next day to fatty foods if has had reflux day before.    Stopped caffeine last week.  No symptoms since that time.            Problem list and histories reviewed & adjusted, as indicated.  Additional history: as documented    BP Readings from Last 3 Encounters:   04/24/17 132/72   04/13/17 (!) 167/93   02/20/17 136/88    Wt Readings from Last 3 Encounters:   04/24/17 105 kg (231 lb 8 oz)   02/20/17 105.7 kg (233 lb)   01/16/17 104.8 kg (231 lb)                    Reviewed and updated as needed this visit by clinical staff  Allergies  Meds       Reviewed and updated as needed this visit by Provider  Tobacco  Allergies  Meds  Med Hx  Surg Hx  Fam Hx  Soc Hx        ROS:  Constitutional, HEENT, cardiovascular, pulmonary, gi and gu systems are negative, except as otherwise noted.    OBJECTIVE:                                                    /72 (BP Location: Right arm, Patient Position: Chair, Cuff Size: Adult Large)  Pulse 80  Temp 98.1  F (36.7  C) (Oral)  Wt 105 kg (231 lb 8 oz)  SpO2 96%  BMI 34.44 kg/m2  Body mass index is  34.44 kg/(m^2).  GENERAL: alert, no distress and obese  HENT: ear canals and TM's normal, nose and mouth without ulcers or lesions  NECK: no adenopathy, no asymmetry, masses, or scars and thyroid normal to palpation  RESP: lungs clear to auscultation - no rales, rhonchi or wheezes  CV: regular rate and rhythm, normal S1 S2, no S3 or S4, no murmur, click or rub, no peripheral edema and peripheral pulses strong  ABDOMEN: soft, nontender, no hepatosplenomegaly, no masses and bowel sounds normal  MS: no gross musculoskeletal defects noted, no edema noted in hands currently.  Rings tight  SKIN: no suspicious lesions or rashes  PSYCH: mentation appears normal, affect normal/bright         ASSESSMENT/PLAN:                                                          1. Hypertension goal BP (blood pressure) < 140/90  See instructions - trial lower dose amlodipine while monitoring BP - has not tolerated other medications well in past - would plan verapamil extended release aat 100 mg daily if swelling sense continues with the amlodipine.      2. Nasal congestion  Trial different antihistamine.   - fexofenadine (ALLEGRA) 180 MG tablet; Take 1 tablet (180 mg) by mouth daily  Dispense: 30 tablet; Refill: 1    3. Gastroesophageal reflux disease without esophagitis  ?triggering the throat closing symptoms.  Avoiding caffeine which is a good choice. Call if recurrent throat symptoms occur.       Patient Instructions   For the BP - decrease the Amlodipine to 1.25 mg (1/2 pill) daily.   Send me a [employer] message in 1-2 weeks.  If BP is not controlled or side effects continue - change to a different medication may be needed.    For the allergy symptoms - you can use claritin (loratidine) 10 mg OR zyrtec (cetrizine) 10 mg OR allegra (Fexofenadine) 180 mg. Each of these are dosed once daily.  I am sending allegra in today.            Rosalinda Muse MD  [facility]      Total time:  28 min,  Counseling time:  Greater than  50% of total time with reference to the above noted symptoms.

## 2017-04-24 NOTE — NURSING NOTE
"Chief Complaint   Patient presents with     Hypertension       Initial /72 (BP Location: Right arm, Patient Position: Chair, Cuff Size: Adult Large)  Pulse 80  Temp 98.1  F (36.7  C) (Oral)  Wt 105 kg (231 lb 8 oz)  SpO2 96%  BMI 34.44 kg/m2 Estimated body mass index is 34.44 kg/(m^2) as calculated from the following:    Height as of 2/20/17: 1.746 m (5' 8.75\").    Weight as of this encounter: 105 kg (231 lb 8 oz).  Medication Reconciliation: complete       Jory Olmedo CMA      "

## 2017-04-24 NOTE — PATIENT INSTRUCTIONS
For the BP - decrease the Amlodipine to 1.25 mg (1/2 pill) daily.   Send me a [employer] message in 1-2 weeks.  If BP is not controlled or side effects continue - change to a different medication may be needed.    For the allergy symptoms - you can use claritin (loratidine) 10 mg OR zyrtec (cetrizine) 10 mg OR allegra (Fexofenadine) 180 mg. Each of these are dosed once daily.  I am sending allegra in today.

## 2017-04-25 ASSESSMENT — ANXIETY QUESTIONNAIRES: GAD7 TOTAL SCORE: 0

## 2017-04-25 ASSESSMENT — PATIENT HEALTH QUESTIONNAIRE - PHQ9: SUM OF ALL RESPONSES TO PHQ QUESTIONS 1-9: 3

## 2017-04-25 ASSESSMENT — ASTHMA QUESTIONNAIRES: ACT_TOTALSCORE: 23

## 2017-04-26 PROBLEM — K21.9 GASTROESOPHAGEAL REFLUX DISEASE WITHOUT ESOPHAGITIS: Status: ACTIVE | Noted: 2017-04-26

## 2017-04-27 ENCOUNTER — ALLIED HEALTH/NURSE VISIT (OUTPATIENT)
Dept: NURSING | Facility: CLINIC | Age: 62
End: 2017-04-27
Payer: COMMERCIAL

## 2017-04-27 DIAGNOSIS — Z48.02 ENCOUNTER FOR REMOVAL OF SUTURES: Primary | ICD-10-CM

## 2017-04-27 PROCEDURE — 99207 ZZC NO CHARGE NURSE ONLY: CPT

## 2017-04-27 NOTE — NURSING NOTE
Marie Kaiser comes into clinic today at the request of Dr. Dubois Ordering Provider for suture removal.    Marie presents to the clinic today for  removal of sutures.  The patient has had the sutures in place for 14 days.    There has been no history of infection or drainage.    O: sutures are seen located on the vertex scalp.  The wound is healing well with no signs of infection.    A: Suture removal.    P:  All sutures were easily removed today.  Routine wound care discussed.  The patient will follow up as needed.    This service provided today was under the supervising provider of the day Dr. Ballard, who was available if needed.    Rosanna Jeff RN

## 2017-04-27 NOTE — MR AVS SNAPSHOT
After Visit Summary   4/27/2017    Marie Kaiesr    MRN: [id number]           Patient Information     Date Of Birth          1955        Visit Information        Provider Department      4/27/2017 4:00 PM NURSE ONLY MG DERM [facility]        Today's Diagnoses     Encounter for removal of sutures    -  1       Follow-ups after your visit        Your next 10 appointments already scheduled     Apr 28, 2017 10:00 AM CDT   SLEEP DME MASK FITTING with BK SC DME   [facility] ([facility])    [address]  [facility] [address]   [phone]            Apr 28, 2017 11:00 AM CDT   Telephone Visit PAP Review 30 Day with VIRTUAL CARE COORDINATOR 4   [facility] ([facility])    [address] [place] [address]   [phone]            May 19, 2017 10:00 AM CDT   Return Sleep Patient with RADHA Blankenship   [facility] ([facility])    [address] [address] [address]   [phone]            Mar 09, 2018  8:45 AM CST   Return Visit with Trudi Santizo MD   [facility] ([facility])    [address] [place] [address] [phone]              Who to contact     If you have questions or need follow up information about today's clinic visit or your schedule please contact [facility] directly at [phone].  Normal or non-critical lab and imaging results will be communicated to you by MyChart, letter or phone within 4 business days after the clinic has received the results. If you do not hear from us within 7 days, please contact the clinic through MyChart or phone. If you have a critical or abnormal lab result, we will notify you by phone as soon as possible.  Submit refill requests through [employer] or call your  pharmacy and they will forward the refill request to us. Please allow 3 business days for your refill to be completed.          Additional Information About Your Visit        atVenuharSTWA Information     365 [employer]ites gives you secure access to your electronic health record. If you see a primary care provider, you can also send messages to your care team and make appointments. If you have questions, please call your primary care clinic.  If you do not have a primary care provider, please call [phone] and they will assist you.      365 [employer]ites is an electronic gateway that provides easy, online access to your medical records. With [employer] doc[employer]ites, you can request a clinic appointment, read your test results, renew a prescription or communicate with your care team.     To access your existing account, please contact your [facility] or call [phone] for assistance.        Care EveryWhere ID     This is your Care EveryWhere ID. This could be used by other organizations to access your [place] medical records  [id number]         Blood Pressure from Last 3 Encounters:   04/24/17 132/72   04/13/17 (!) 167/93   02/20/17 136/88    Weight from Last 3 Encounters:   04/24/17 105 kg (231 lb 8 oz)   02/20/17 105.7 kg (233 lb)   01/16/17 104.8 kg (231 lb)              Today, you had the following     No orders found for display       Primary Care Provider Office Phone # Fax #    Rosalinda Muse [phone]       [place] [address] [place] [id number]        Thank you!     Thank you for choosing [facility]  for your care. Our goal is always to provide you with excellent care. Hearing back from our patients is one way we can continue to improve our services. Please take a few minutes to complete the written survey that you may receive in the mail after your visit with us. Thank you!             Your Updated Medication List - Protect others around you: Learn  how to safely use, store and throw away your medicines at www.disposemymeds.org.          This list is accurate as of: 4/27/17  4:10 PM.  Always use your most recent med list.                   Brand Name Dispense Instructions for use    amLODIPine 2.5 MG tablet    NORVASC    30 tablet    Take 1 tablet (2.5 mg) by mouth daily       clotrimazole 1 % cream    LOTRIMIN    15 g    Apply topically 2 times daily       EPINEPHrine 0.3 MG/0.3ML injection     0.6 mL    Inject 0.3 mLs (0.3 mg) into the muscle once as needed for anaphylaxis       fexofenadine 180 MG tablet    ALLEGRA    30 tablet    Take 1 tablet (180 mg) by mouth daily       loratadine 10 MG tablet    CLARITIN    90 tablet    Take 1 tablet (10 mg) by mouth daily       mometasone 0.1 % cream    ELOCON    45 g    Apply twice daily for up to ten days       order for DME      Equipment ordered: Respironics Auto PAP Mask type: Nasal Settings: 5-15 cm

## 2017-04-28 ENCOUNTER — DOCUMENTATION ONLY (OUTPATIENT)
Dept: SLEEP MEDICINE | Facility: CLINIC | Age: 62
End: 2017-04-28

## 2017-04-28 NOTE — PROGRESS NOTES
30 DAY STM VISIT    Subjective measures:   Pt was just in for a mask fitting today.     Assessment: Pt meeting objective benchmarks.  Patient failing following subjective benchmarks: mask discomfort  Action plan: Pt to have 6 month STM visit  Patient has a follow up visit with RADHA Murray on 05/19/2017 .   Device settings:    5-15 cm H20     Objective measures: 14 day rolling measures      85.7 %  Compliance      0.0% leak     2.9 AHI       334 min average usage         Objective measure goal  Compliance   Goal >70%  Leak   Goal < 10%  AHI  Goal < 5  Usage  Goal >240

## 2017-04-28 NOTE — PROGRESS NOTES
Patient complained of feeling claustrophobic in the Resmed Airfit N20 nasal mask that she received at her setup appointment on 3/28/17. Patient wants to try a smaller mask and was fitted for the Resmed Airfit P10 pillow mask size medium.

## 2017-05-22 ENCOUNTER — OFFICE VISIT (OUTPATIENT)
Dept: SLEEP MEDICINE | Facility: CLINIC | Age: 62
End: 2017-05-22
Payer: COMMERCIAL

## 2017-05-22 VITALS
HEIGHT: 69 IN | SYSTOLIC BLOOD PRESSURE: 152 MMHG | DIASTOLIC BLOOD PRESSURE: 98 MMHG | BODY MASS INDEX: 34.42 KG/M2 | WEIGHT: 232.4 LBS | HEART RATE: 72 BPM | OXYGEN SATURATION: 97 %

## 2017-05-22 DIAGNOSIS — G47.33 OSA (OBSTRUCTIVE SLEEP APNEA): Primary | ICD-10-CM

## 2017-05-22 PROCEDURE — 99213 OFFICE O/P EST LOW 20 MIN: CPT | Performed by: PHYSICIAN ASSISTANT

## 2017-05-22 NOTE — NURSING NOTE
"Chief Complaint   Patient presents with     CPAP Follow Up     C PAP follow up; she feels the machine is very loud.        Initial BP (!) 152/98  Pulse 72  Ht 1.753 m (5' 9\")  Wt 105.4 kg (232 lb 6.4 oz)  SpO2 97%  BMI 34.32 kg/m2 Estimated body mass index is 34.32 kg/(m^2) as calculated from the following:    Height as of this encounter: 1.753 m (5' 9\").    Weight as of this encounter: 105.4 kg (232 lb 6.4 oz).  Medication Reconciliation: complete   "

## 2017-05-22 NOTE — PATIENT INSTRUCTIONS

## 2017-05-22 NOTE — MR AVS SNAPSHOT
After Visit Summary   5/22/2017    Marie Kaiser    MRN: [id number]           Patient Information     Date Of Birth          1955        Visit Information        Provider Department      5/22/2017 3:20 PM Nan Grant PA [facility]        Today's Diagnoses     DONA (obstructive sleep apnea)    -  1      Care Instructions      Your BMI is There is no height or weight on file to calculate BMI.  Weight management is a personal decision.  If you are interested in exploring weight loss strategies, the following discussion covers the approaches that may be successful. Body mass index (BMI) is one way to tell whether you are at a healthy weight, overweight, or obese. It measures your weight in relation to your height.  A BMI of 18.5 to 24.9 is in the healthy range. A person with a BMI of 25 to 29.9 is considered overweight, and someone with a BMI of 30 or greater is considered obese. More than two-thirds of American adults are considered overweight or obese.  Being overweight or obese increases the risk for further weight gain. Excess weight may lead to heart disease and diabetes.  Creating and following plans for healthy eating and physical activity may help you improve your health.  Weight control is part of healthy lifestyle and includes exercise, emotional health, and healthy eating habits. Careful eating habits lifelong are the mainstay of weight control. Though there are significant health benefits from weight loss, long-term weight loss with diet alone may be very difficult to achieve- studies show long-term success with dietary management in less than 10% of people. Attaining a healthy weight may be especially difficult to achieve in those with severe obesity. In some cases, medications, devices and surgical management might be considered.  What can you do?  If you are overweight or obese and are interested in methods for weight loss, you should discuss this with your  provider.     Consider reducing daily calorie intake by 500 calories.     Keep a food journal.     Avoiding skipping meals, consider cutting portions instead.    Diet combined with exercise helps maintain muscle while optimizing fat loss. Strength training is particularly important for building and maintaining muscle mass. Exercise helps reduce stress, increase energy, and improves fitness. Increasing exercise without diet control, however, may not burn enough calories to loose weight.       Start walking three days a week 10-20 minutes at a time    Work towards walking thirty minutes five days a week     Eventually, increase the speed of your walking for 1-2 minutes at time    In addition, we recommend that you review healthy lifestyles and methods for weight loss available through the National Institutes of Health patient information sites:  http://win.niddk.nih.gov/publications/index.htm    And look into health and wellness programs that may be available through your health insurance provider, employer, local community center, or [employer].    Weight management plan: Patient was referred to their PCP to discuss a diet and exercise plan.       Your Body mass index is 34.32 kg/(m^2).  Weight management is a personal decision.  If you are interested in exploring weight loss strategies, the following discussion covers the approaches that may be successful. Body mass index (BMI) is one way to tell whether you are at a healthy weight, overweight, or obese. It measures your weight in relation to your height.  A BMI of 18.5 to 24.9 is in the healthy range. A person with a BMI of 25 to 29.9 is considered overweight, and someone with a BMI of 30 or greater is considered obese. More than two-thirds of American adults are considered overweight or obese.  Being overweight or obese increases the risk for further weight gain. Excess weight may lead to heart disease and diabetes.  Creating and following plans for healthy eating  and physical activity may help you improve your health.  Weight control is part of healthy lifestyle and includes exercise, emotional health, and healthy eating habits. Careful eating habits lifelong are the mainstay of weight control. Though there are significant health benefits from weight loss, long-term weight loss with diet alone may be very difficult to achieve- studies show long-term success with dietary management in less than 10% of people. Attaining a healthy weight may be especially difficult to achieve in those with severe obesity. In some cases, medications, devices and surgical management might be considered.  What can you do?  If you are overweight or obese and are interested in methods for weight loss, you should discuss this with your provider.     Consider reducing daily calorie intake by 500 calories.     Keep a food journal.     Avoiding skipping meals, consider cutting portions instead.    Diet combined with exercise helps maintain muscle while optimizing fat loss. Strength training is particularly important for building and maintaining muscle mass. Exercise helps reduce stress, increase energy, and improves fitness. Increasing exercise without diet control, however, may not burn enough calories to loose weight.       Start walking three days a week 10-20 minutes at a time    Work towards walking thirty minutes five days a week     Eventually, increase the speed of your walking for 1-2 minutes at time    In addition, we recommend that you review healthy lifestyles and methods for weight loss available through the National Institutes of Health patient information sites:  http://win.niddk.nih.gov/publications/index.htm    And look into health and wellness programs that may be available through your health insurance provider, employer, local community center, or [employer].    Weight management plan: Patient was referred to their PCP to discuss a diet and exercise plan.          Follow-ups after  "your visit        Follow-up notes from your care team     Return in about 2 years (around 5/22/2019).      Your next 10 appointments already scheduled     Mar 09, 2018  8:45 AM CST   Return Visit with Trudi Santizo MD   [facility]    [address] [address] [place] [id number] [phone]              Who to contact     If you have questions or need follow up information about today's clinic visit or your schedule please contact [facility] directly at [phone].  Normal or non-critical lab and imaging results will be communicated to you by MyChart, letter or phone within 4 business days after the clinic has received the results. If you do not hear from us within 7 days, please contact the clinic through [employer] or phone. If you have a critical or abnormal lab result, we will notify you by phone as soon as possible.  Submit refill requests through [employer] or call your pharmacy and they will forward the refill request to us. Please allow 3 business days for your refill to be completed.          Additional Information About Your Visit        MyChart Information     [employer] gives you secure access to your electronic health record. If you see a primary care provider, you can also send messages to your care team and make appointments. If you have questions, please call your primary care clinic.  If you do not have a primary care provider, please call [phone] and they will assist you.        Care EveryWhere ID     This is your Care EveryWhere ID. This could be used by other organizations to access your [place] medical records  [id number]        Your Vitals Were     Pulse Height Pulse Oximetry BMI (Body Mass Index)          72 1.753 m (5' 9\") 97% 34.32 kg/m2         Blood Pressure from Last 3 Encounters:   05/22/17 (!) 152/98   04/24/17 132/72   04/13/17 (!) 167/93    Weight from Last 3 Encounters:   05/22/17 105.4 kg (232 lb 6.4 oz)   04/24/17 " 105 kg (231 lb 8 oz)   02/20/17 105.7 kg (233 lb)              We Performed the Following     Sleep Comprehensive DME        Primary Care Provider Office Phone # Fax #    Rosalinda Muse [phone]       [facility] [address] [place] [address] [address]  [facility] [address]        Thank you!     Thank you for choosing [facility]  for your care. Our goal is always to provide you with excellent care. Hearing back from our patients is one way we can continue to improve our services. Please take a few minutes to complete the written survey that you may receive in the mail after your visit with us. Thank you!             Your Updated Medication List - Protect others around you: Learn how to safely use, store and throw away your medicines at www.disposemymeds.org.          This list is accurate as of: 5/22/17  3:56 PM.  Always use your most recent med list.                   Brand Name Dispense Instructions for use    amLODIPine 2.5 MG tablet    NORVASC    30 tablet    Take 1 tablet (2.5 mg) by mouth daily       clotrimazole 1 % cream    LOTRIMIN    15 g    Apply topically 2 times daily       EPINEPHrine 0.3 MG/0.3ML injection     0.6 mL    Inject 0.3 mLs (0.3 mg) into the muscle once as needed for anaphylaxis       fexofenadine 180 MG tablet    ALLEGRA    30 tablet    Take 1 tablet (180 mg) by mouth daily       loratadine 10 MG tablet    CLARITIN    90 tablet    Take 1 tablet (10 mg) by mouth daily       mometasone 0.1 % cream    ELOCON    45 g    Apply twice daily for up to ten days       order for DME      Equipment ordered: Respironics Auto PAP Mask type: Nasal Settings: 5-15 cm

## 2017-05-25 ENCOUNTER — MYC MEDICAL ADVICE (OUTPATIENT)
Dept: FAMILY MEDICINE | Facility: CLINIC | Age: 62
End: 2017-05-25

## 2017-05-25 DIAGNOSIS — I10 HYPERTENSION GOAL BP (BLOOD PRESSURE) < 140/90: Primary | Chronic | ICD-10-CM

## 2017-05-25 RX ORDER — VERAPAMIL HYDROCHLORIDE 100 MG/1
100 CAPSULE, EXTENDED RELEASE ORAL AT BEDTIME
Qty: 30 CAPSULE | Refills: 1 | Status: SHIPPED | OUTPATIENT
Start: 2017-05-25 | End: 2017-08-29

## 2017-05-25 NOTE — TELEPHONE ENCOUNTER
"Informed pt of message below. She would like to know what \"class\" drug this is? I informed pt it is for htn not sure what class drug. She would like and RN to call her back with more information on this medication.    Jess Mojica MA  "

## 2017-05-25 NOTE — TELEPHONE ENCOUNTER
Please call patient with info in message below - see her message - requested follow up phone call. TOMÁS

## 2017-05-25 NOTE — TELEPHONE ENCOUNTER
Patient contacted. We have talked about verapamil, given info below. Pt verbalized understanding.  Brandi Crenshaw RN

## 2017-05-30 ENCOUNTER — TELEPHONE (OUTPATIENT)
Dept: FAMILY MEDICINE | Facility: CLINIC | Age: 62
End: 2017-05-30

## 2017-05-30 NOTE — TELEPHONE ENCOUNTER
fexofenadine (ALLEGRA) 180 MG tablet not covered. Pharmacy states to use OTC loratadine dissolve tab/liquid or OTC cetrizine chewable/liquid or do PA. Please advise.      Jory Olmedo, CMA

## 2017-07-14 ENCOUNTER — TELEPHONE (OUTPATIENT)
Dept: FAMILY MEDICINE | Facility: CLINIC | Age: 62
End: 2017-07-14

## 2017-08-04 ENCOUNTER — MYC MEDICAL ADVICE (OUTPATIENT)
Dept: FAMILY MEDICINE | Facility: CLINIC | Age: 62
End: 2017-08-04

## 2017-08-29 ENCOUNTER — OFFICE VISIT (OUTPATIENT)
Dept: FAMILY MEDICINE | Facility: CLINIC | Age: 62
End: 2017-08-29
Payer: COMMERCIAL

## 2017-08-29 VITALS
DIASTOLIC BLOOD PRESSURE: 102 MMHG | BODY MASS INDEX: 34.36 KG/M2 | HEART RATE: 84 BPM | TEMPERATURE: 98.3 F | HEIGHT: 69 IN | SYSTOLIC BLOOD PRESSURE: 176 MMHG | RESPIRATION RATE: 16 BRPM | WEIGHT: 232 LBS | OXYGEN SATURATION: 96 %

## 2017-08-29 DIAGNOSIS — R41.3 MEMORY CHANGES: Primary | ICD-10-CM

## 2017-08-29 DIAGNOSIS — I10 HYPERTENSION GOAL BP (BLOOD PRESSURE) < 140/90: Chronic | ICD-10-CM

## 2017-08-29 DIAGNOSIS — E03.4 HYPOTHYROIDISM DUE TO ACQUIRED ATROPHY OF THYROID: Chronic | ICD-10-CM

## 2017-08-29 PROCEDURE — 36415 COLL VENOUS BLD VENIPUNCTURE: CPT | Performed by: FAMILY MEDICINE

## 2017-08-29 PROCEDURE — 99214 OFFICE O/P EST MOD 30 MIN: CPT | Performed by: FAMILY MEDICINE

## 2017-08-29 PROCEDURE — 84439 ASSAY OF FREE THYROXINE: CPT | Performed by: FAMILY MEDICINE

## 2017-08-29 PROCEDURE — 84443 ASSAY THYROID STIM HORMONE: CPT | Performed by: FAMILY MEDICINE

## 2017-08-29 RX ORDER — FELODIPINE 5 MG/1
5 TABLET, EXTENDED RELEASE ORAL DAILY
Qty: 30 TABLET | Refills: 0 | Status: SHIPPED | OUTPATIENT
Start: 2017-08-29 | End: 2017-10-26

## 2017-08-29 RX ORDER — VERAPAMIL HYDROCHLORIDE 100 MG/1
100 CAPSULE, EXTENDED RELEASE ORAL AT BEDTIME
Qty: 30 CAPSULE | Refills: 1 | Status: SHIPPED | OUTPATIENT
Start: 2017-08-29 | End: 2017-08-29

## 2017-08-29 ASSESSMENT — ANXIETY QUESTIONNAIRES
5. BEING SO RESTLESS THAT IT IS HARD TO SIT STILL: NOT AT ALL
7. FEELING AFRAID AS IF SOMETHING AWFUL MIGHT HAPPEN: NOT AT ALL
GAD7 TOTAL SCORE: 0
3. WORRYING TOO MUCH ABOUT DIFFERENT THINGS: NOT AT ALL
2. NOT BEING ABLE TO STOP OR CONTROL WORRYING: NOT AT ALL
6. BECOMING EASILY ANNOYED OR IRRITABLE: NOT AT ALL
1. FEELING NERVOUS, ANXIOUS, OR ON EDGE: NOT AT ALL

## 2017-08-29 ASSESSMENT — PATIENT HEALTH QUESTIONNAIRE - PHQ9
5. POOR APPETITE OR OVEREATING: NOT AT ALL
SUM OF ALL RESPONSES TO PHQ QUESTIONS 1-9: 12

## 2017-08-29 ASSESSMENT — PAIN SCALES - GENERAL: PAINLEVEL: NO PAIN (0)
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
## 2017-08-29 NOTE — MR AVS SNAPSHOT
After Visit Summary   8/29/2017    Marie Kaiser    MRN: [id number]           Patient Information     Date Of Birth          1955        Visit Information        Provider Department      8/29/2017 6:00 PM Samantha Antoine MD [facility]        Today's Diagnoses     Memory changes    -  1    Hypertension goal BP (blood pressure) < 140/90           Follow-ups after your visit        Follow-up notes from your care team     Return in about 1 month (around 9/29/2017).      Your next 10 appointments already scheduled     Mar 09, 2018  8:45 AM CST   Return Visit with Trudi Santizo MD   [facility]    [address] [place] [id number] [phone]              Future tests that were ordered for you today     Open Future Orders        Priority Expected Expires Ordered    MR Brain w/o & w Contrast Routine  8/29/2018 8/29/2017            Who to contact     If you have questions or need follow up information about today's clinic visit or your schedule please contact [facility] directly at [phone].  Normal or non-critical lab and imaging results will be communicated to you by [email or web address], letter or phone within 4 business days after the clinic has received the results. If you do not hear from us within 7 days, please contact the clinic through [email or web address] or phone. If you have a critical or abnormal lab result, we will notify you by phone as soon as possible.  Submit refill requests through [employer] or call your pharmacy and they will forward the refill request to us. Please allow 3 business days for your refill to be completed.          Additional Information About Your Visit        MyChart Information     [employer] gives you secure access to your electronic health record. If you see a primary care provider, you can also send messages to your care team and make appointments. If you have questions, please call  "your primary care clinic.  If you do not have a primary care provider, please call [phone] and they will assist you.        Care EveryWhere ID     This is your Care EveryWhere ID. This could be used by other organizations to access your [place] medical records  [id number]        Your Vitals Were     Pulse Temperature Respirations Height Pulse Oximetry BMI (Body Mass Index)    84 98.3  F (36.8  C) (Oral) 16 1.753 m (5' 9\") 96% 34.26 kg/m2       Blood Pressure from Last 3 Encounters:   08/29/17 (!) 176/102   05/22/17 (!) 152/98   04/24/17 132/72    Weight from Last 3 Encounters:   08/29/17 105.2 kg (232 lb)   05/22/17 105.4 kg (232 lb 6.4 oz)   04/24/17 105 kg (231 lb 8 oz)                 Today's Medication Changes          These changes are accurate as of: 8/29/17  6:47 PM.  If you have any questions, ask your nurse or doctor.               Start taking these medicines.        Dose/Directions    felodipine ER 5 MG 24 hr tablet   Commonly known as:  PLENDIL   Used for:  Hypertension goal BP (blood pressure) < 140/90   Started by:  Samantha Antoine MD        Dose:  5 mg   Take 1 tablet (5 mg) by mouth daily   Quantity:  30 tablet   Refills:  0         Stop taking these medicines if you haven't already. Please contact your care team if you have questions.     spironolactone 25 MG tablet   Commonly known as:  ALDACTONE   Stopped by:  Samatnha Antoine MD                Where to get your medicines      These medications were sent to [facility] [address] [place] [facility]  [address] [facility] [address]     Phone:  [phone]     felodipine ER 5 MG 24 hr tablet                Primary Care Provider Office Phone # Fax #    Rosalinda Muse [phone] [address] [facility] [address]        Equal Access to Services     JANE LIND AH: Radha yu Sotiny, wacortneyda luqadaha, qaybta kandy montgomeryarash la'aan ah. So " [facility] [phone].    ATENCIÓN: Si adolfo jones, tiene a novoa disposición servicios gratuitos de asistencia lingüística. Conchis vinson [phone].    We comply with applicable federal civil rights laws and [place] laws. We do not discriminate on the basis of race, color, national origin, age, disability sex, sexual orientation or gender identity.            Thank you!     Thank you for choosing [facility]  for your care. Our goal is always to provide you with excellent care. Hearing back from our patients is one way we can continue to improve our services. Please take a few minutes to complete the written survey that you may receive in the mail after your visit with us. Thank you!             Your Updated Medication List - Protect others around you: Learn how to safely use, store and throw away your medicines at www.disposemymeds.org.          This list is accurate as of: 8/29/17  6:47 PM.  Always use your most recent med list.                   Brand Name Dispense Instructions for use Diagnosis    clotrimazole 1 % cream    LOTRIMIN    15 g    Apply topically 2 times daily    Tinea corporis       EPINEPHrine 0.3 MG/0.3ML injection 2-pack    EPIPEN/ADRENACLICK/or ANY BX GENERIC EQUIV    0.6 mL    Inject 0.3 mLs (0.3 mg) into the muscle once as needed for anaphylaxis    Food allergy       felodipine ER 5 MG 24 hr tablet    PLENDIL    30 tablet    Take 1 tablet (5 mg) by mouth daily    Hypertension goal BP (blood pressure) < 140/90       fexofenadine 180 MG tablet    ALLEGRA    30 tablet    Take 1 tablet (180 mg) by mouth daily    Nasal congestion       loratadine 10 MG tablet    CLARITIN    90 tablet    Take 1 tablet (10 mg) by mouth daily    Nasal congestion       mometasone 0.1 % cream    ELOCON    45 g    Apply twice daily for up to ten days    Rash       order for DME      Equipment ordered: Respironics Auto PAP Mask type: Nasal Settings: 5-15 cm
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
## 2017-08-30 LAB
T4 FREE SERPL-MCNC: 1.04 NG/DL (ref 0.76–1.46)
TSH SERPL DL<=0.005 MIU/L-ACNC: 4.96 MU/L (ref 0.4–4)

## 2017-08-30 ASSESSMENT — ANXIETY QUESTIONNAIRES: GAD7 TOTAL SCORE: 0

## 2017-08-30 ASSESSMENT — ASTHMA QUESTIONNAIRES: ACT_TOTALSCORE: 22

## 2017-09-04 NOTE — PROGRESS NOTES
Your thyroid testing is mildly abnormal.  The active thyroid hormone is normal.  I would not expect the the memory issues to be related to the thyroid function since your active hormone is normal.  The thyroid stimulating hormone is elevated which means the thyroid is getting greater stimulation to keep the active hormone normal.  If you were taking thyroid supplement we might consider changing dose.   Since you have not taken the medication since 2013 and your labs have been normal yearly until the current test, I would recommend a follow up test in 8-12 weeks to see if normalizes again.  Please call or MyChart message me if you have any questions.    PSK

## 2017-09-08 ENCOUNTER — MYC MEDICAL ADVICE (OUTPATIENT)
Dept: FAMILY MEDICINE | Facility: CLINIC | Age: 62
End: 2017-09-08

## 2017-09-08 DIAGNOSIS — F41.1 ANXIETY AS ACUTE REACTION TO GROSS STRESS: Primary | ICD-10-CM

## 2017-09-08 DIAGNOSIS — F43.0 ANXIETY AS ACUTE REACTION TO GROSS STRESS: Primary | ICD-10-CM

## 2017-09-08 RX ORDER — DIAZEPAM 10 MG
10 TABLET ORAL EVERY 6 HOURS PRN
Qty: 1 TABLET | Refills: 0 | Status: SHIPPED | OUTPATIENT
Start: 2017-09-08 | End: 2018-01-17

## 2017-09-09 ENCOUNTER — MYC MEDICAL ADVICE (OUTPATIENT)
Dept: FAMILY MEDICINE | Facility: CLINIC | Age: 62
End: 2017-09-09

## 2017-09-09 DIAGNOSIS — E03.4 HYPOTHYROIDISM DUE TO ACQUIRED ATROPHY OF THYROID: Primary | Chronic | ICD-10-CM

## 2017-09-12 RX ORDER — THYROID 30 MG/1
30 TABLET ORAL DAILY
Qty: 90 TABLET | Refills: 0 | Status: SHIPPED | OUTPATIENT
Start: 2017-09-12 | End: 2017-12-07

## 2017-09-13 ENCOUNTER — MYC MEDICAL ADVICE (OUTPATIENT)
Dept: FAMILY MEDICINE | Facility: CLINIC | Age: 62
End: 2017-09-13

## 2017-09-13 DIAGNOSIS — F41.1 ANXIETY AS ACUTE REACTION TO EXCEPTIONAL STRESS: Primary | ICD-10-CM

## 2017-09-13 DIAGNOSIS — F43.0 ANXIETY AS ACUTE REACTION TO EXCEPTIONAL STRESS: Primary | ICD-10-CM

## 2017-09-21 RX ORDER — ALPRAZOLAM 0.5 MG
TABLET ORAL
Qty: 2 TABLET | Refills: 0 | Status: SHIPPED | OUTPATIENT
Start: 2017-09-21 | End: 2018-01-17

## 2017-09-22 ENCOUNTER — TRANSFERRED RECORDS (OUTPATIENT)
Dept: HEALTH INFORMATION MANAGEMENT | Facility: CLINIC | Age: 62
End: 2017-09-22

## 2017-09-25 ENCOUNTER — TELEPHONE (OUTPATIENT)
Dept: FAMILY MEDICINE | Facility: CLINIC | Age: 62
End: 2017-09-25

## 2017-09-25 NOTE — TELEPHONE ENCOUNTER
"  ED for acute condition Discharge Protocol    \"Hi, my name is Timoteo Gonzalez, a registered nurse, and I am calling from [facility].  I am calling to follow up and see how things are going for you after your recent emergency visit.\"    Tell me how you are doing now that you are home?\" Patient states her foot is much better now - has been elevating and bruising is almost gone.       Discharge Instructions    \"Let's review your discharge instructions.  What is/are the follow-up recommendations?  Pt. Response: Patient was advised to follow up within 1 week per PCP - patient declines and states it is almost better and doesn't want to be seen for this.  However, please see notes below regarding patient report of stopping felodipine.    \"Has an appointment with your primary care provider been scheduled?\"  patient declines    Medications    \"Tell me what changed about your medicines when you discharged?\"    none    \"What questions do you have about your medications?\"   None          Call Summary    \"What questions or concerns do you have about your recent visit and your follow-up care?\"         1. Patient has questions / concerns about the MRI that is scheduled for 9/27. She is afraid to take the xanax dose 60 min prior  because last time she took the medication given, they took so long to get her into the MRI that it wore off by the time they were ready for her.    Advised patient to call the radiology scheduling (can transfer call now) to discuss when to take but patient states they told her there is no way ahead of time to be able to tell if they are running behind or on schedule that day. Pt states she is supposed to check in 45 minutes prior to MRI time. Advised if patient does not want to take the 60 min dose prior to checking in, then just bring both doses with her and speak with radiology when they check her in to find out when to take the doses.      2. Patient states she stopped taking Felodipine about " "1.5 weeks ago due to side effects. Pt states she started this med on 8/29 and it caused a daily progressive migraine on the top of her head. It also caused her to see stars when bending over.  When asked what was BP at UC visit, pt states it was 192/92.  PT states \"they wanted to send me to the hospital but I didn't go, because what can they do for me\".  When asked if has checked BP at home since then, pt states yes, I check it every day. It is usually ok in the morning and high by the evening. tTday it was 139/77. Yesterday it was 134/79 in the morning and 154/89 at 1730.  Recommended patient be seen for BP follow up in clinic and explained her the BP in  was way too high.  Pt is reluctant to set up follow up appt stating she was told she needs to see a cardiologist for this and doesn't think Dr. Muse knows what to give her because they have tried everything. \"the only thing that helped was Lisinopril which caused a cough\".    Routing message to provider to review and advise.    Timoteo Gonzalez RN                      "

## 2017-09-26 NOTE — TELEPHONE ENCOUNTER
Spoke with pt, pt states she will need to call back to schedule appt.    Claudette BASILIO, Patient Care

## 2017-09-27 ENCOUNTER — MYC MEDICAL ADVICE (OUTPATIENT)
Dept: FAMILY MEDICINE | Facility: CLINIC | Age: 62
End: 2017-09-27

## 2017-09-27 ENCOUNTER — RADIANT APPOINTMENT (OUTPATIENT)
Dept: MRI IMAGING | Facility: CLINIC | Age: 62
End: 2017-09-27
Attending: FAMILY MEDICINE
Payer: COMMERCIAL

## 2017-09-27 PROCEDURE — A9585 GADOBUTROL INJECTION: HCPCS | Performed by: RADIOLOGY

## 2017-09-27 PROCEDURE — 70553 MRI BRAIN STEM W/O & W/DYE: CPT | Performed by: RADIOLOGY

## 2017-09-27 RX ORDER — GADOBUTROL 604.72 MG/ML
10 INJECTION INTRAVENOUS ONCE
Status: COMPLETED | OUTPATIENT
Start: 2017-09-27 | End: 2017-09-27

## 2017-09-27 RX ADMIN — GADOBUTROL 10 ML: 604.72 INJECTION INTRAVENOUS at 16:26

## 2017-09-28 ENCOUNTER — TELEPHONE (OUTPATIENT)
Dept: FAMILY MEDICINE | Facility: CLINIC | Age: 62
End: 2017-09-28

## 2017-09-28 DIAGNOSIS — R09.81 NASAL CONGESTION: ICD-10-CM

## 2017-09-28 NOTE — TELEPHONE ENCOUNTER
fexofendine is no longer covered on patients insurance, please suggest an alternative.    Katlin Harvey

## 2017-09-29 RX ORDER — CETIRIZINE HYDROCHLORIDE 10 MG/1
10 TABLET ORAL EVERY EVENING
Qty: 90 TABLET | Refills: 1 | Status: SHIPPED | OUTPATIENT
Start: 2017-09-29 | End: 2018-01-26

## 2017-10-26 ENCOUNTER — MYC MEDICAL ADVICE (OUTPATIENT)
Dept: FAMILY MEDICINE | Facility: CLINIC | Age: 62
End: 2017-10-26

## 2017-10-26 DIAGNOSIS — I10 HYPERTENSION GOAL BP (BLOOD PRESSURE) < 140/90: Chronic | ICD-10-CM

## 2017-10-26 RX ORDER — FELODIPINE 5 MG/1
5 TABLET, EXTENDED RELEASE ORAL DAILY
Qty: 90 TABLET | Refills: 1 | Status: SHIPPED | OUTPATIENT
Start: 2017-10-26 | End: 2018-01-17

## 2017-12-07 ENCOUNTER — MYC MEDICAL ADVICE (OUTPATIENT)
Dept: FAMILY MEDICINE | Facility: CLINIC | Age: 62
End: 2017-12-07

## 2017-12-07 DIAGNOSIS — E03.4 HYPOTHYROIDISM DUE TO ACQUIRED ATROPHY OF THYROID: Chronic | ICD-10-CM

## 2017-12-07 RX ORDER — THYROID 30 MG/1
TABLET ORAL
Qty: 90 TABLET | Refills: 0 | Status: SHIPPED | OUTPATIENT
Start: 2017-12-07 | End: 2018-02-05

## 2017-12-07 NOTE — TELEPHONE ENCOUNTER
Requested Prescriptions   Pending Prescriptions Disp Refills     ARMOUR THYROID 30 MG tablet [Pharmacy Med Name: ARMOUR THYROID 30 MG TABLET] 90 tablet 0     Sig: TAKE 1 TABLET BY MOUTH ONCE DAILY    There is no refill protocol information for this order          Routing refill request to provider for review/approval because:  Drug not on the [facility] refill protocol     Aspen Woods RN   [facility]

## 2017-12-07 NOTE — TELEPHONE ENCOUNTER
thyroid (ARMOUR THYROID) 30 MG tablet     Last Written Prescription Date: 9/12/17  Last Quantity: 90, # refills: 0  [place] [facility] [place] or [facility] prescribing provider: 8/29/17        TSH   Date Value Ref Range Status   08/29/2017 4.96 (H) 0.40 - 4.00 mU/L Final

## 2017-12-08 ENCOUNTER — TRANSFERRED RECORDS (OUTPATIENT)
Dept: HEALTH INFORMATION MANAGEMENT | Facility: CLINIC | Age: 62
End: 2017-12-08

## 2017-12-08 LAB
CREAT SERPL-MCNC: 0.71 MG/DL (ref 0.57–1.11)
GFR SERPL CREATININE-BSD FRML MDRD: >60 ML/MIN/1.73M2
GLUCOSE SERPL-MCNC: 149 MG/DL (ref 65–100)
POTASSIUM SERPL-SCNC: 4.2 MMOL/L (ref 3.5–5)

## 2017-12-14 ENCOUNTER — ALLIED HEALTH/NURSE VISIT (OUTPATIENT)
Dept: NURSING | Facility: CLINIC | Age: 62
End: 2017-12-14
Payer: COMMERCIAL

## 2017-12-14 VITALS
HEART RATE: 80 BPM | DIASTOLIC BLOOD PRESSURE: 80 MMHG | BODY MASS INDEX: 35.54 KG/M2 | HEIGHT: 68 IN | WEIGHT: 234.5 LBS | SYSTOLIC BLOOD PRESSURE: 152 MMHG

## 2017-12-14 DIAGNOSIS — Z00.6 EXAMINATION OF PARTICIPANT IN CLINICAL TRIAL: Primary | ICD-10-CM

## 2017-12-14 PROCEDURE — 36415 COLL VENOUS BLD VENIPUNCTURE: CPT | Performed by: FAMILY MEDICINE

## 2017-12-14 PROCEDURE — 80048 BASIC METABOLIC PNL TOTAL CA: CPT | Performed by: FAMILY MEDICINE

## 2017-12-14 PROCEDURE — 99207 ZZC NO CHARGE NURSE ONLY: CPT

## 2017-12-14 NOTE — Clinical Note
Marleni, do you think this patient is ok to participate in PGEN study for htn?  See my note about atrial fibrillation.  If active and needs to be on rate control medication for it and HTN then we would not want to enroll since the protocol could call for a medication that has no rate control impact. You can see the details of the study if you put in <dot>PGENPISTUDYSUMMARY.    Or call my cell [phone]  Christi, please check on this outcome and we can decide how to proceed.   Clover Salcedo MD

## 2017-12-14 NOTE — PROGRESS NOTES
The following section is to be completed by [place] Pharmacist/ PGEN provider:      PGEN study visit 1 : HTN Uncontrolled on 1 BP medication Class   Study location: [place]   12/14/2017     This research participant has responded to study outreach efforts and has expressed interest in PGEN study.  she has self reported that she satisfies the following inclusion and exclusion criteria:    Inclusion criteria:  1) New diagnosis of high blood pressure but not yet on blood pressure medication or   2) Existing hypertension not controlled with one CLASS of blood pressure medication and  3) Age between 30 and 80 and  4) BMI between 19-50    Today's bmi is Body mass index is 35.66 kg/(m^2).    Exclusion Criteria:    1) All patients taking more than 1 class of hypertensive medications  2) Heart disease  (Coronary artery disease)  3) Chronic kidney disease  4) Vascular disease  5) High blood pressure due to other underlying condition (i.e. Secondary HTN)  6) Systolic BP > 169 OR  DBP >109    BP screening readings by automatic cuff were: 138/81 and 143/82. Per patient she has extensive history of high blood pressure. She asked to have a third reading in the lobby and it was 157/87.    Is screening BP reading today above the following parameters? Yes  >140/90 for ages 30-59 and all patients with diabetes   >150/90 for age >60     Smoking: has never smoked.  Alcohol consumption none  Exercise: 1-3 times/week and is very active at work  Following a DASH diet currently ?Yes    Update  Medications, and Allergies: Done   Pt is currently on felodipine 50mg daily. She has had reactions to lisinopril (cough), losartan (swelling), and spironolactone (kidney issues).      Current Outpatient Prescriptions on File Prior to Visit:  ARMOUR THYROID 30 MG tablet TAKE 1 TABLET BY MOUTH ONCE DAILY   felodipine ER (PLENDIL) 5 MG 24 hr tablet Take 1 tablet (5 mg) by mouth daily   cetirizine (ZYRTEC) 10 MG tablet Take 1 tablet (10 mg) by mouth  "every evening   EPINEPHrine 0.3 MG/0.3ML injection Inject 0.3 mLs (0.3 mg) into the muscle once as needed for anaphylaxis   ALPRAZolam (XANAX) 0.5 MG tablet Take 1 pill 60 min prior procedure and 1 pill 15 min prior to procedure if needed. (Patient not taking: Reported on 12/14/2017)   diazepam (VALIUM) 10 MG tablet Take 1 tablet (10 mg) by mouth every 6 hours as needed for anxiety or sleep Take 30-60 minutes before procedure.  Do not operate a vehicle after taking this medication. (Patient not taking: Reported on 12/14/2017)   fexofenadine (ALLEGRA) 180 MG tablet Take 1 tablet (180 mg) by mouth daily (Patient not taking: Reported on 12/14/2017)   order for DME Equipment ordered: [employer] Auto PAP Mask type: Nasal Settings: 5-15 cm   mometasone (ELOCON) 0.1 % cream Apply twice daily for up to ten days (Patient not taking: Reported on 12/14/2017)   clotrimazole (LOTRIMIN) 1 % cream Apply topically 2 times daily (Patient not taking: Reported on 12/14/2017)   loratadine (CLARITIN) 10 MG tablet Take 1 tablet (10 mg) by mouth daily (Patient not taking: Reported on 12/14/2017)     No current facility-administered medications on file prior to visit.     Last Basic Metabolic Panel:  Lab Results   Component Value Date     05/12/2016      Lab Results   Component Value Date    POTASSIUM 3.9 05/12/2016     Lab Results   Component Value Date    CHLORIDE 102 11/16/2015     Lab Results   Component Value Date    KAVON 8.6 11/16/2015     Lab Results   Component Value Date    CO2 31 11/16/2015     Lab Results   Component Value Date    BUN 12 11/16/2015     Lab Results   Component Value Date    CR 0.62 11/16/2015     Lab Results   Component Value Date    GLC 98 11/16/2015        A baseline potassium, creatinine, BUN, GFR has not been done within past 12 months (at [place] or accessible via Care Everywhere)      Ht 5' 8\" (1.727 m)  Wt 234 lb 8 oz (106.4 kg)  BMI 35.66 kg/m2      Is the manual confirmatory BP reading as " "recorded in the vitals section today within the study parameters for enrollment? Yes  >140/90 for ages 30-59 and diabetics  >150/90 for age >60  Exclude patients with: Systolic BP > 169 OR  DBP >109    Please complete \"PGEN Hypertension Study\" flow sheet : Done    Which arm is this potential patient being considered for enrollment into ? HTN Uncontrolled on 1 BP medication Class     Patient has signed the consent form and HIPPAA form: Done.     During washout period   Patient WILL taper her current blood pressure medication. Pt will take one felodipine tablet every other day for one week. She has had variable and very high BP readings in the past, including a trip to the ED, so I would like her to go off medication slowly.     Patient will check her blood pressure twice daily and record them on the the blood pressure log in the research folder.  As per the consent form patient will call the research triage line if blood pressure readings are >170/110 on more than 2 separate occasions. Patient consents to washout period of 4 weeks without blood pressure medications: yes.      Patients with established HTN but not currently taking blood pressure medication are advised to wait 4 weeks to be started on medication as per protocol (even though he/she would not being going through a 'wash out' period )    Cheek swabs (genetic profile test) was obtained per protocol and will be provided to clinic lab today : Done    Bar code associated with genetic test kit is : [id number]     Results will be available to the research team in 2-3 weeks and patient will be advised of the treatment plan, based on the randomization process, at that time (or at 4 weeks for patients in the uncontrolled HTN group)    Our research team will reach out to patient to schedule a follow up visit.    Patient was counseled regarding the lifestyle changes (listed below)  to help with BP management Done  Lifestyle changes that can help control high blood " pressure:  Even though PGEN is a study to test effectiveness of genetically guided medications for managing high blood pressure, there are several things you can do to ensure your blood pressure stays in good control:    Maintain a healthy weight (BMI<26). A modest amount of weight loss can be helpful    Limit salt intake to under 2400mg daily    Follow the DASH diet (lean meats, low salt, whole grains, lots of fruits/vegies)    Stay active, try to get in 30 minutes of exercise daily.    Manage your daily stress.    Do not smoke cigarettes (or cut back)    Limit alcohol (2 drinks/day for men, 1 drink/day for women)  Was AVS  provided to patient with the relevant <dot>PGENPI... dot phrase pulled into patient instructions: yes    Patient was given an opportunity to ask questions.  Patient verbalized understanding of this plan and is agreeable to continuing with this research study : yes    Cindy Patel, PharmD    [facility] provider notes and plan:  Based on the above and chart review the patient is noted to have:HTN Uncontrolled on 1 BP medication Class      Is patient suitable for enrollment into PGEN study: Uncertain.  I see a diagnosis of Afib on problem list but no active rate control medication nor anticoagulation .  I am not sure this is active.  I will check with PCP prior to enrollment.     I have added the diagnosis of hypertension to the problem list with the appropriate JNC8 target blood pressure:  Existed.       Clover Salcedo MD

## 2017-12-14 NOTE — PATIENT INSTRUCTIONS
"[facility] Hypertension Study   Visit 1     Thank you for your interest in the [facility] hypertension research study.    Please stop taking felodipine. To go off the felodipine, take one tablet every other day for one week, then stop.    At the visit today we swabbed your cheeks to obtain a genetic test that will be used to guide your blood pressure treatment.      The research protocol requires that a patient currently on 1 blood pressure medicine stop or taper off of their blood pressure medication before starting genetically guided treatment.This is called a \"washout period\" and allows your body time to remove this medicine.  This process has been safely done in prior blood pressure studies.  You should have been given instructions on how to do this today.  If you still have questions please contact the research coordinator at [phone].    In the coming days you will be contacted by a research team member to schedule your next office visit.  After it is scheduled, be sure to let the research coordinator know as soon as possible if you cannot make it so that the visit can be rescheduled for you.     As a participant in the [facility] for Hypertension Study you will be asked to use a blood pressure cuff for the first 30 days of your study participation to see how your blood pressure is when you are off of your blood pressure medication. You are being asked to wear the blood pressure cuff to measure your blood pressure twice daily. Please ensure that measurements are taken in the morning after a five minute resting period and before exercising, eating, or consuming alcohol or caffeine products. Wait at least 30 minutes after showering before taking measurement.    You will be asked to enter these daily blood pressure measurements onto a paper log. At the end of each week, you will need to enter your blood pressure values into the online blood pressure log provided by [employer] via  email link. If you are completing the surveys on " paper, please return the paper blood pressure log to your clinic during your second study visit.     **IMPORTANT** If during these 30 days you record a systolic reading of 170  or higher or a diastolic reading of 110 or higher for one of your two blood pressure readings, wait 2-3 minutes and take a third blood pressure measurement. We ask that you then call the [employer] 24/7 triage system at [phone] if you record two high blood pressure readings. In addition, please contact the [employer] triage system if you experience any symptoms that may be related to hypertension.     If you record blood pressure at this level and also experience symptoms such  as chest pain, shortness of breath, numbness/weakness, change in vision or difficulty speaking call 911      In four weeks, your hypertension medications will be prescribed via phone or through [facility].     Lifestyle changes that can help control high blood pressure:  Even though PGEN is a study to test effectiveness of genetically guided medications for managing high blood pressure, there are several things you can do to ensure your blood pressure stays in good control:    Maintain a healthy weight (BMI<26). A modest amount of weight loss can be helpful    Limit salt intake to under 2400mg daily    Follow the DASH diet (lean meats, low salt, whole grains, lots of fruits/vegies)    Stay active, try to get in 30 minutes of exercise daily.    Manage your daily stress.    Do not smoke cigarettes (or cut back)    Limit alcohol (2 drinks/day for men, 1 drink/day for women)    If you have any questions or concerns about the study please contact the research coordinator at [phone]. If your phone number, email or address changes please alert the research coordinator.    In the meantime, we ask that you complete the online surveys emailed out to  you, if completing online, or mailed out to you, if completing paper surveys,  before your next visit.

## 2017-12-14 NOTE — MR AVS SNAPSHOT
"              After Visit Summary   12/14/2017    Marie Kaiser    MRN: [id number]           Patient Information     Date Of Birth          1955        Visit Information        Provider Department      12/14/2017 1:30 PM Provider, [facility]        Today's Diagnoses     Examination of participant in clinical trial    -  1      Care Instructions    [facility] Hypertension Study   Visit 1     Thank you for your interest in the [facility] hypertension research study.    Please stop taking felodipine. To go off the felodipine, take one tablet every other day for one week, then stop.    At the visit today we swabbed your cheeks to obtain a genetic test that will be used to guide your blood pressure treatment.      The research protocol requires that a patient currently on 1 blood pressure medicine stop or taper off of their blood pressure medication before starting genetically guided treatment.This is called a \"washout period\" and allows your body time to remove this medicine.  This process has been safely done in prior blood pressure studies.  You should have been given instructions on how to do this today.  If you still have questions please contact the research coordinator at [phone].    In the coming days you will be contacted by a research team member to schedule your next office visit.  After it is scheduled, be sure to let the research coordinator know as soon as possible if you cannot make it so that the visit can be rescheduled for you.     As a participant in the [facility] for Hypertension Study you will be asked to use a blood pressure cuff for the first 30 days of your study participation to see how your blood pressure is when you are off of your blood pressure medication. You are being asked to wear the blood pressure cuff to measure your blood pressure twice daily. Please ensure that measurements are taken in the morning after a five minute resting period and before exercising, eating, " or consuming alcohol or caffeine products. Wait at least 30 minutes after showering before taking measurement.    You will be asked to enter these daily blood pressure measurements onto a paper log. At the end of each week, you will need to enter your blood pressure values into the online blood pressure log provided by [employer] via  email link. If you are completing the surveys on paper, please return the paper blood pressure log to your clinic during your second study visit.     **IMPORTANT** If during these 30 days you record a systolic reading of 170  or higher or a diastolic reading of 110 or higher for one of your two blood pressure readings, wait 2-3 minutes and take a third blood pressure measurement. We ask that you then call the "[employer] 24/7 triage system at [phone] if you record two high blood pressure readings. In addition, please contact the "[employer] triage system if you experience any symptoms that may be related to hypertension.     If you record blood pressure at this level and also experience symptoms such  as chest pain, shortness of breath, numbness/weakness, change in vision or difficulty speaking call 911      In four weeks, your hypertension medications will be prescribed via phone or through [employer].     Lifestyle changes that can help control high blood pressure:  Even though PGEN is a study to test effectiveness of genetically guided medications for managing high blood pressure, there are several things you can do to ensure your blood pressure stays in good control:    Maintain a healthy weight (BMI<26). A modest amount of weight loss can be helpful    Limit salt intake to under 2400mg daily    Follow the DASH diet (lean meats, low salt, whole grains, lots of fruits/vegies)    Stay active, try to get in 30 minutes of exercise daily.    Manage your daily stress.    Do not smoke cigarettes (or cut back)    Limit alcohol (2 drinks/day for men, 1 drink/day for women)    If you have any  questions or concerns about the study please contact the research coordinator at [phone]. If your phone number, email or address changes please alert the research coordinator.    In the meantime, we ask that you complete the online surveys emailed out to  you, if completing online, or mailed out to you, if completing paper surveys,  before your next visit.            Follow-ups after your visit        Follow-up notes from your care team     Return in about 4 weeks (around 1/11/2018).      Your next 10 appointments already scheduled     Mar 09, 2018  8:45 AM CST   [facility]izo [facility]   [facility]    [address] [address] [place] [id number] [phone]              Who to contact     If you have questions or need follow up information about today's clinic visit or your schedule please contact [facility] directly at [phone].  Normal or non-critical lab and imaging results will be communicated to you by MyChart, letter or phone within 4 business days after the clinic has received the results. If you do not hear from us within 7 days, please contact the clinic through [employer] or phone. If you have a critical or abnormal lab result, we will notify you by phone as soon as possible.  Submit refill requests through [employer] or call your pharmacy and they will forward the refill request to us. Please allow 3 business days for your refill to be completed.          Additional Information About Your Visit        [employer] Information     [employer] gives you secure access to your electronic health record. If you see a primary care provider, you can also send messages to your care team and make appointments. If you have questions, please call your primary care clinic.  If you do not have a primary care provider, please call [phone] and they will assist you.        Care EveryWhere ID     This is your Care EveryWhere ID. This could be  "used by other organizations to access your [facility] medical records  [id number]        Your Vitals Were     Height BMI (Body Mass Index)                5' 8\" (1.727 m) 35.66 kg/m2           Blood Pressure from Last 3 Encounters:   12/14/17 154/80   08/29/17 (!) 176/102   05/22/17 (!) 152/98    Weight from Last 3 Encounters:   12/14/17 234 lb 8 oz (106.4 kg)   08/29/17 232 lb (105.2 kg)   05/22/17 232 lb 6.4 oz (105.4 kg)              We Performed the Following     Basic metabolic panel  (Ca, Cl, CO2, Creat, Gluc, K, Na, BUN)        Primary Care Provider Office Phone # Fax #    Rosalinda Muse [phone] [address]  [facility] [address]        Equal Access to Services     [facility]: Hadii aad ku hadasho Soomaali, waaxda luqadaha, qaybta kaalmada adeegyada, waxay fior haynilsa linares . So [facility] [phone].    ATENCIÓN: Si habla español, tiene a novoa disposición servicios gratuitos de asistencia lingüística. [place] al [phone].    We comply with applicable federal civil rights laws and [place] laws. We do not discriminate on the basis of race, color, national origin, age, disability, sex, sexual orientation, or gender identity.            Thank you!     Thank you for choosing [facility]  for your care. Our goal is always to provide you with excellent care. Hearing back from our patients is one way we can continue to improve our services. Please take a few minutes to complete the written survey that you may receive in the mail after your visit with us. Thank you!             Your Updated Medication List - Protect others around you: Learn how to safely use, store and throw away your medicines at www.disposemymeds.org.          This list is accurate as of: 12/14/17  2:35 PM.  Always use your most recent med list.                   Brand Name Dispense Instructions for use Diagnosis    ALPRAZolam 0.5 MG tablet    XANAX    2 tablet    Take 1 pill 60 min " prior procedure and 1 pill 15 min prior to procedure if needed.    Anxiety as acute reaction to exceptional stress       ARMOUR THYROID 30 MG tablet   Generic drug:  thyroid     90 tablet    TAKE 1 TABLET BY MOUTH ONCE DAILY    Hypothyroidism due to acquired atrophy of thyroid       cetirizine 10 MG tablet    zyrTEC    90 tablet    Take 1 tablet (10 mg) by mouth every evening    Nasal congestion       clotrimazole 1 % cream    LOTRIMIN    15 g    Apply topically 2 times daily    Tinea corporis       diazepam 10 MG tablet    VALIUM    1 tablet    Take 1 tablet (10 mg) by mouth every 6 hours as needed for anxiety or sleep Take 30-60 minutes before procedure.  Do not operate a vehicle after taking this medication.    Anxiety as acute reaction to gross stress       EPINEPHrine 0.3 MG/0.3ML injection 2-pack    EPIPEN/ADRENACLICK/or ANY BX GENERIC EQUIV    0.6 mL    Inject 0.3 mLs (0.3 mg) into the muscle once as needed for anaphylaxis    Food allergy       felodipine ER 5 MG 24 hr tablet    PLENDIL    90 tablet    Take 1 tablet (5 mg) by mouth daily    Hypertension goal BP (blood pressure) < 140/90       fexofenadine 180 MG tablet    ALLEGRA    30 tablet    Take 1 tablet (180 mg) by mouth daily    Nasal congestion       loratadine 10 MG tablet    CLARITIN    90 tablet    Take 1 tablet (10 mg) by mouth daily    Nasal congestion       mometasone 0.1 % cream    ELOCON    45 g    Apply twice daily for up to ten days    Rash       order for DME      Equipment ordered: [employer] Auto PAP Mask type: Nasal Settings: 5-15 cm

## 2017-12-15 LAB
ANION GAP SERPL CALCULATED.3IONS-SCNC: 9 MMOL/L (ref 3–14)
BUN SERPL-MCNC: 22 MG/DL (ref 7–30)
CALCIUM SERPL-MCNC: 8.9 MG/DL (ref 8.5–10.1)
CHLORIDE SERPL-SCNC: 105 MMOL/L (ref 94–109)
CO2 SERPL-SCNC: 26 MMOL/L (ref 20–32)
CREAT SERPL-MCNC: 0.77 MG/DL (ref 0.52–1.04)
GFR SERPL CREATININE-BSD FRML MDRD: 75 ML/MIN/1.7M2
GLUCOSE SERPL-MCNC: 124 MG/DL (ref 70–99)
POTASSIUM SERPL-SCNC: 3.9 MMOL/L (ref 3.4–5.3)
SODIUM SERPL-SCNC: 140 MMOL/L (ref 133–144)

## 2017-12-17 ENCOUNTER — TELEPHONE (OUTPATIENT)
Dept: FAMILY MEDICINE | Facility: CLINIC | Age: 62
End: 2017-12-17

## 2017-12-17 DIAGNOSIS — I10 HYPERTENSION GOAL BP (BLOOD PRESSURE) < 140/90: Chronic | ICD-10-CM

## 2017-12-18 NOTE — TELEPHONE ENCOUNTER
Christi , see Dr. Muse ok for patient to enroll   Not sure if I had sent you this chart already.  Cindy had seen patient on 12/14  Please release/run the sample and arrange future follow-up as needed    Clover Salcedo MD

## 2017-12-18 NOTE — TELEPHONE ENCOUNTER
Patient  Had episodes of PAF at night evaluated with Cardiology(2007)  Attempt with rate control medications but side effects to meds noted.  She was subsequently treated for DONA with CPAP and no recent symptoms of A.fib/palpitations described.  holter monitor (2009) with occasional PVC's only.    I do not think the past history of PAF should preclude her from participation if otherwise a candidate.    Thanks,  PSK

## 2017-12-18 NOTE — TELEPHONE ENCOUNTER
Marleni,  This patient came in to see our PGEN study team to consider enrolling in our blood pressure study.He does qualify though I see a diagnosis of atrial fibrillation on his problem list.  Though I do not see a rate control medication nor anticoagulation.  So I am not certain if this is active or not.    If he does have atrial fibrillation and needs rate control, he would not be a good candidate for our study since it is possible that the protocol may have him on non rate control blood pressure medications (eg aceI, diuretic, CCB, etc.)       More details about this study can be found by going to [email or web address].  Or typing <dot>[email or web address]>  In Epic .    Could you let us know how you would like us to proceed?  Clover Salcedo MD    .

## 2017-12-22 ENCOUNTER — TRANSFERRED RECORDS (OUTPATIENT)
Dept: HEALTH INFORMATION MANAGEMENT | Facility: CLINIC | Age: 62
End: 2017-12-22

## 2018-01-02 DIAGNOSIS — E03.4 HYPOTHYROIDISM DUE TO ACQUIRED ATROPHY OF THYROID: Chronic | ICD-10-CM

## 2018-01-02 LAB
T3 SERPL-MCNC: 136 NG/DL (ref 60–181)
T4 FREE SERPL-MCNC: 0.9 NG/DL (ref 0.76–1.46)
TSH SERPL DL<=0.005 MIU/L-ACNC: 2.18 MU/L (ref 0.4–4)

## 2018-01-02 PROCEDURE — 84439 ASSAY OF FREE THYROXINE: CPT | Performed by: FAMILY MEDICINE

## 2018-01-02 PROCEDURE — 84480 ASSAY TRIIODOTHYRONINE (T3): CPT | Performed by: FAMILY MEDICINE

## 2018-01-02 PROCEDURE — 84443 ASSAY THYROID STIM HORMONE: CPT | Performed by: FAMILY MEDICINE

## 2018-01-02 PROCEDURE — 36415 COLL VENOUS BLD VENIPUNCTURE: CPT | Performed by: FAMILY MEDICINE

## 2018-01-08 ENCOUNTER — TELEPHONE (OUTPATIENT)
Dept: FAMILY MEDICINE | Facility: CLINIC | Age: 63
End: 2018-01-08

## 2018-01-08 DIAGNOSIS — I10 HYPERTENSION GOAL BP (BLOOD PRESSURE) < 140/90: Primary | Chronic | ICD-10-CM

## 2018-01-08 RX ORDER — METOPROLOL SUCCINATE 25 MG/1
25 TABLET, EXTENDED RELEASE ORAL DAILY
Qty: 90 TABLET | Refills: 1 | Status: SHIPPED | OUTPATIENT
Start: 2018-01-08 | End: 2018-01-17 | Stop reason: SINTOL

## 2018-01-08 NOTE — TELEPHONE ENCOUNTER
Hi    Your patient in currently enrolled in the PGEN hypertension study being done at [facility] clinics.      She  has been randomized to the genetically guided group.  That result is in the media tab or is in the process of being scanned in.     Please do NOT share any of the genetic information with the patient.  Please also do NOT share which group she has been randomized to.    I have placed a standing order as well as the initial blood pressure medication as indicated by our study protocol. You can refer to today's orders for more details.       If you do NOT agree with the standing order, please route back to me with the changes you would like to see and I can then modify the order to reflect your adjustment based on clinical judgement.     More details about this study can be found by going to [email or web address].  Or typing <dot>[email or web address]>  in Epic     Study team:   Please reach out and advise prescription for toprol XL was ordered.    Please advise patient of these common side effects potentially associated with this prescription : fatigue/dizziness.  Do not stop medication suddenly, should taper off slowly if needed.   Pharmacist will also provide more medication related education.    Please also arrange follow-up per protocol.     Meron Tovar covering for Clover Salcedo MD  PGEN study

## 2018-01-08 NOTE — TELEPHONE ENCOUNTER
Reason for Call:  Other     Detailed comments: Patient wants to know today, if she has taken Toprol? Patient needs to know this today. Patient is on a study    Phone Number Patient can be reached at: [phone] (N)    Best Time: as soon as possible    Can we leave a detailed message on this number? YES    Call taken on 1/8/2018 at 3:18 PM by Catie Coburn

## 2018-01-08 NOTE — TELEPHONE ENCOUNTER
Patient is due to be prescribed medications based on genetic recommendations. Medications need to be started on 1/11/2018, but patient states she will need to pick them up prior to that date and is requesting prescription to be sent today. Her pharmacy of choice is [facility] [employer] [facility] [place].     As a reminder, please place the standing order in addition to prescribing medication.

## 2018-01-08 NOTE — TELEPHONE ENCOUNTER
Per medication list and history, patient has not previously been on this medication until it was started today at appointment. Notified patient of this information via detailed VM Last BP medication she was taking was Lisinopril per [facility] medication list.    Julien Peña RN, BSN

## 2018-01-08 NOTE — TELEPHONE ENCOUNTER
"Patient was called regarding starting metoprolol; she stated she may have been prescribed this in the past and that it is on her \"do not take list\". She stated she would call her physician to double check before starting medication and would reach out to the PGen study team with an update.   "

## 2018-01-16 ENCOUNTER — TELEPHONE (OUTPATIENT)
Dept: FAMILY MEDICINE | Facility: CLINIC | Age: 63
End: 2018-01-16

## 2018-01-16 NOTE — TELEPHONE ENCOUNTER
Cindy,  Please contact this patient and advise her to see her primary care provider.  Her constellation of symptoms certainly seems unusual to be attributable to metoprolol allergy alone.      Let's advise her to stop the metoprolol until she is able to see her primary care physician.  Hopefully she can have her symptoms assessed and we can collaborate with her primary care provider on what to do next.  It certainly reasonable to go to another class of medicines and go given her multiple allergies I would like to try to ensure that it is not the metoprolol causing all the symptoms that she has had.      If necessary we certainly can go to a calcium channel blocker.    Clover Salcedo MD

## 2018-01-16 NOTE — TELEPHONE ENCOUNTER
----- Message from Cindy Patel [facility] sent at 1/16/2018 10:48 AM CST -----  Regarding: Reaction to Medication  Hi Dr. Salcedo,    I'm covering Anahi's phone today and this patient called in to report some side effects to her newly prescribed metoprolol. She reports severe dry mouth with tooth pain, insomnia, kidney pain when urinating, constipation, nausea, and she had a panic attack two days after starting the medication. She started her metoprolol on 1/11 and has not taken it since Sunday. This patient has an extensive medication allergy/adverse reaction history, but she did say she did not have problems when she was on the felodipine 25mg in the past, it just didn't lower her BP enough.    I'm not sure how what the next steps are, but wanted to pass this along. Also, do I need to document this phone conversation in Epic as a phone encounter?    Thanks,  Cindy

## 2018-01-17 ENCOUNTER — OFFICE VISIT (OUTPATIENT)
Dept: FAMILY MEDICINE | Facility: CLINIC | Age: 63
End: 2018-01-17
Payer: COMMERCIAL

## 2018-01-17 VITALS
HEIGHT: 70 IN | TEMPERATURE: 98.1 F | BODY MASS INDEX: 34.22 KG/M2 | HEART RATE: 70 BPM | WEIGHT: 239 LBS | SYSTOLIC BLOOD PRESSURE: 146 MMHG | DIASTOLIC BLOOD PRESSURE: 88 MMHG | OXYGEN SATURATION: 97 %

## 2018-01-17 DIAGNOSIS — I10 HYPERTENSION GOAL BP (BLOOD PRESSURE) < 140/90: Chronic | ICD-10-CM

## 2018-01-17 PROCEDURE — 99213 OFFICE O/P EST LOW 20 MIN: CPT | Performed by: PHYSICIAN ASSISTANT

## 2018-01-17 RX ORDER — FELODIPINE 5 MG/1
5 TABLET, EXTENDED RELEASE ORAL DAILY
Qty: 30 TABLET | Refills: 1 | Status: SHIPPED | OUTPATIENT
Start: 2018-01-17 | End: 2018-03-12

## 2018-01-17 ASSESSMENT — PAIN SCALES - GENERAL: PAINLEVEL: MODERATE PAIN (5)

## 2018-01-17 NOTE — MR AVS SNAPSHOT
After Visit Summary   1/17/2018    Marie Kaiser    MRN: [id number]           Patient Information     Date Of Birth          1955        Visit Information        Provider Department      1/17/2018 6:40 PM Yanni Moura PA-C [facility]        Today's Diagnoses     Hypertension goal BP (blood pressure) < 140/90          Care Instructions    Discontinue metoprolol.  Start felodipine 5 mg daily.  Follow up with study provider or Dr. Muse within one month.   Return urgently if any change in symptoms or concerns.             Follow-ups after your visit        Your next 10 appointments already scheduled     Jan 26, 2018 10:00 AM CST   [facility] with CHINA Alfredo CNP   [facility]    [address] [address] [phone]            Mar 09, 2018  8:45 AM CST   Return Visit with Trudi Santizo MD   [facility] ([facility]    [id number] [place] [address] [phone]              Who to contact     If you have questions or need follow up information about today's clinic visit or your schedule please contact [facility] directly at [phone].  Normal or non-critical lab and imaging results will be communicated to you by MyChart, letter or phone within 4 business days after the clinic has received the results. If you do not hear from us within 7 days, please contact the clinic through MyChart or phone. If you have a critical or abnormal lab result, we will notify you by phone as soon as possible.  Submit refill requests through "[employer] or call your pharmacy and they will forward the refill request to us. Please allow 3 business days for your refill to be completed.          Additional Information About Your Visit        [employer] Information     "[employer] gives you secure access to your electronic health record. If  "you see a primary care provider, you can also send messages to your care team and make appointments. If you have questions, please call your primary care clinic.  If you do not have a primary care provider, please call [phone] and they will assist you.        Care EveryWhere ID     This is your Care EveryWhere ID. This could be used by other organizations to access your [place] medical records  [id number]        Your Vitals Were     Pulse Temperature Height Pulse Oximetry Breastfeeding? BMI (Body Mass Index)    70 98.1  F (36.7  C) 1.77 m (5' 9.69\") 97% No 34.6 kg/m2       Blood Pressure from Last 3 Encounters:   01/17/18 146/88   12/14/17 152/80   08/29/17 (!) 176/102    Weight from Last 3 Encounters:   01/17/18 108.4 kg (239 lb)   12/14/17 106.4 kg (234 lb 8 oz)   08/29/17 105.2 kg (232 lb)              Today, you had the following     No orders found for display         Today's Medication Changes          These changes are accurate as of: 1/17/18  7:00 PM.  If you have any questions, ask your nurse or doctor.               Stop taking these medicines if you haven't already. Please contact your care team if you have questions.     metoprolol succinate 25 MG 24 hr tablet   Commonly known as:  TOPROL-XL   Stopped by:  Yanni Moura PA-C                Where to get your medicines      These medications were sent to [facility] [address] [place] [facility] [address] [place] [address]  [address] [address]     Phone:  [phone]     felodipine ER 5 MG 24 hr tablet                Primary Care Provider Office Phone # Fax #    Rosalinda Muse [phone] [address]  [facility] [address]        Equal Access to Services     [facility] campusANITA : Radha Galvez, waaxda luqadaha, qaybta kaalmakandy miramontes. So [facility] [phone].    ATENCIÓN: Si habla español, tiene a novoa disposición servicios gratuitos de asistencia lingüística. " Conchis vinson [phone].    We comply with applicable federal civil rights laws and [place] laws. We do not discriminate on the basis of race, color, national origin, age, disability, sex, sexual orientation, or gender identity.            Thank you!     Thank you for choosing [facility]  for your care. Our goal is always to provide you with excellent care. Hearing back from our patients is one way we can continue to improve our services. Please take a few minutes to complete the written survey that you may receive in the mail after your visit with us. Thank you!             Your Updated Medication List - Protect others around you: Learn how to safely use, store and throw away your medicines at www.disposemymeds.org.          This list is accurate as of: 1/17/18  7:00 PM.  Always use your most recent med list.                   Brand Name Dispense Instructions for use Diagnosis    ARMOUR THYROID 30 MG tablet   Generic drug:  thyroid     90 tablet    TAKE 1 TABLET BY MOUTH ONCE DAILY    Hypothyroidism due to acquired atrophy of thyroid       cetirizine 10 MG tablet    zyrTEC    90 tablet    Take 1 tablet (10 mg) by mouth every evening    Nasal congestion       EPINEPHrine 0.3 MG/0.3ML injection 2-pack    EPIPEN/ADRENACLICK/or ANY BX GENERIC EQUIV    0.6 mL    Inject 0.3 mLs (0.3 mg) into the muscle once as needed for anaphylaxis    Food allergy       felodipine ER 5 MG 24 hr tablet    PLENDIL    30 tablet    Take 1 tablet (5 mg) by mouth daily    Hypertension goal BP (blood pressure) < 140/90       fexofenadine 180 MG tablet    ALLEGRA    30 tablet    Take 1 tablet (180 mg) by mouth daily    Nasal congestion       loratadine 10 MG tablet    CLARITIN    90 tablet    Take 1 tablet (10 mg) by mouth daily    Nasal congestion       mometasone 0.1 % cream    ELOCON    45 g    Apply twice daily for up to ten days    Rash       order for DME      Equipment ordered: Respironics Auto PAP Mask type: Nasal  Settings: 5-15 cm

## 2018-01-18 NOTE — PROGRESS NOTES
"  SUBJECTIVE:   Marie Kaiser is a 62 year old female who presents to clinic today for the following health issues:      Hypertension Follow-up      Outpatient blood pressures are being checked at home.  Results are 140/80 to 201/110.    Low Salt Diet: no added salt        Amount of exercise or physical activity: 4-5 days/week for an average of 45-60 minutes    Problems taking medications regularly: No    Medication side effects: Swelling in fingers(painful on right hand)    Diet: Wheat-free diet      Reports that \"first evening after started metoprolol developed severe dry throat and next day had anxiety attack (reports no reason to have an anxiety attack).   Third day woke up with nausea and panic attack went through the roof.  Fingers felt like balloons with swelling.  Had shortness of breath very different from her asthma    Also had frequency of urination and \"kidney pain\" \"    Is reading more labels.  Is really watching sugar and salt.  Also has read a book about \"wheat belly\"  And since cutting wheat out of diet reports \"less brain fog\".  Doesn't feel like napping during the day  Also joined the [employer] within the last 2 weeks  Symptoms have all resolved with discontinuation of med except one knuckle on right hand still feels swollen and a little pain right wrist similar to that she had prior to carpal tunnel release.  Believes all symptoms attributed to metoprolol and wants to go back on felodipine.  No chest pain or shortness of breath.  No abdominal pain.  No nausea or vomiting.  No urinary symptoms   Wonders about taking felodipine twice a day             Problem list and histories reviewed & adjusted, as indicated.  Additional history: as documented    Patient Active Problem List   Diagnosis     Intermittent asthma     Major depressive disorder, recurrent episode, in full remission (HCC)     Hypothyroidism     Impaired fasting glucose     Hyperlipidemia LDL goal <130     Hypertension goal BP (blood " pressure) < 140/90     Advanced directives, counseling/discussion     Diverticulitis of colon     A-fib (H)     Obesity     DONA (obstructive sleep apnea)- mild (AHI 5)     Gastroesophageal reflux disease without esophagitis     Past Surgical History:   Procedure Laterality Date     CARPAL TUNNEL RELEASE RT/LT      bilaterally     COLONOSCOPY  8/8/2011    Procedure:COLONOSCOPY; Surgeon:KEISHA DAVIS; Location:[facility] OR     HC DILATION/CURETTAGE DIAG/THER NON OB      Dr. Sanchez       Social History   Substance Use Topics     Smoking status: Never Smoker     Smokeless tobacco: Never Used     Alcohol use No     Family History   Problem Relation Age of Onset     Asthma Father      GASTROINTESTINAL DISEASE Father      hiatal hernia/acid reflux     HEART DISEASE Brother      Afib     Asthma Brother      Respiratory Brother      sleep apnea     DIABETES Brother      Hypertension Brother      Depression Brother      Respiratory Sister      sleep apnea     Hypertension Sister      Thyroid Disease Sister      Obesity Sister      Depression Sister      DIABETES Sister      Alzheimer Disease Mother      advanced dementia     Allergies Daughter      cats     DIABETES Other      neice and nephew     C.A.D. No family hx of      Breast Cancer No family hx of      Cancer - colorectal No family hx of      Prostate Cancer No family hx of      CANCER No family hx of          Current Outpatient Prescriptions   Medication Sig Dispense Refill            ARMOUR THYROID 30 MG tablet TAKE 1 TABLET BY MOUTH ONCE DAILY 90 tablet 0            cetirizine (ZYRTEC) 10 MG tablet Take 1 tablet (10 mg) by mouth every evening 90 tablet 1     fexofenadine (ALLEGRA) 180 MG tablet Take 1 tablet (180 mg) by mouth daily (Patient not taking: Reported on 12/14/2017) 30 tablet 1     order for DME Equipment ordered: Respironics Auto PAP Mask type: Nasal Settings: 5-15 cm       mometasone (ELOCON) 0.1 % cream Apply twice daily for up to ten days (Patient not  "taking: Reported on 12/14/2017) 45 g 0     loratadine (CLARITIN) 10 MG tablet Take 1 tablet (10 mg) by mouth daily (Patient not taking: Reported on 12/14/2017) 90 tablet 1     EPINEPHrine 0.3 MG/0.3ML injection Inject 0.3 mLs (0.3 mg) into the muscle once as needed for anaphylaxis 0.6 mL 1         Reviewed and updated as needed this visit by clinical staffTobacco  Allergies  Meds  Problems  Med Hx  Surg Hx  Fam Hx  Soc Hx        Reviewed and updated as needed this visit by Provider  Tobacco  Allergies  Meds  Problems  Med Hx  Fam Hx  Soc Hx        ROS:  Constitutional, HEENT, cardiovascular, pulmonary, gi and gu systems are negative, except as otherwise noted.      OBJECTIVE:   /88 (BP Location: Left arm, Patient Position: Chair, Cuff Size: Adult Large)  Pulse 70  Temp 98.1  F (36.7  C)  Ht 1.77 m (5' 9.69\")  Wt 108.4 kg (239 lb)  SpO2 97%  Breastfeeding? No  BMI 34.6 kg/m2  Body mass index is 34.6 kg/(m^2).  GENERAL: healthy, alert and no distress  NECK: no adenopathy, no asymmetry, masses, or scars and thyroid normal to palpation  RESP: lungs clear to auscultation - no rales, rhonchi or wheezes  CV: regular rate and rhythm, normal S1 S2, no S3 or S4, no murmur, click or rub, no peripheral edema and peripheral pulses strong  ABDOMEN: soft, nontender, no hepatosplenomegaly, no masses and bowel sounds normal  MS: extremities normal- no gross deformities noted- +1 pitting edema bilaterally    Diagnostic Test Results:  none     ASSESSMENT/PLAN:         BMI:   Estimated body mass index is 34.6 kg/(m^2) as calculated from the following:    Height as of this encounter: 1.77 m (5' 9.69\").    Weight as of this encounter: 108.4 kg (239 lb).   Weight management plan: Discussed healthy diet and exercise guidelines and patient will follow up in 1 month in clinic to re-evaluate.        1. Hypertension goal BP (blood pressure) < 140/90  Will discontinue metoprolol.  Patient confident symptoms related to " metoprolol since almost all resolved.   Patient would like to take plendil twice a day but advised lets see what blood pressure is with daily med and go from there.    - felodipine ER (PLENDIL) 5 MG 24 hr tablet; Take 1 tablet (5 mg) by mouth daily  Dispense: 30 tablet; Refill: 1    Patient Instructions   Discontinue metoprolol.  Start felodipine 5 mg daily.  Follow up with study provider or Dr. Muse within one month.   Return urgently if any change in symptoms or concerns.      CC chart to PCP for fyi   CC chart to Dr. Salcedo for review as lead of investigative study     SMO MckeonC  [facility]

## 2018-01-18 NOTE — PATIENT INSTRUCTIONS
Discontinue metoprolol.  Start felodipine 5 mg daily.  Follow up with study provider or Dr. Muse within one month.   Return urgently if any change in symptoms or concerns.

## 2018-01-18 NOTE — NURSING NOTE
"No chief complaint on file.      Initial /88 (BP Location: Left arm, Patient Position: Chair, Cuff Size: Adult Large)  Pulse 70  Temp 98.1  F (36.7  C)  Ht 1.77 m (5' 9.69\")  Wt 108.4 kg (239 lb)  SpO2 97%  Breastfeeding? No  BMI 34.6 kg/m2 Estimated body mass index is 34.6 kg/(m^2) as calculated from the following:    Height as of this encounter: 1.77 m (5' 9.69\").    Weight as of this encounter: 108.4 kg (239 lb).  Medication Reconciliation: complete     Anette Ambrocio      "

## 2018-01-26 ENCOUNTER — OFFICE VISIT (OUTPATIENT)
Dept: FAMILY MEDICINE | Facility: CLINIC | Age: 63
End: 2018-01-26

## 2018-01-26 VITALS
WEIGHT: 235 LBS | OXYGEN SATURATION: 96 % | HEIGHT: 70 IN | TEMPERATURE: 98.4 F | HEART RATE: 75 BPM | BODY MASS INDEX: 33.64 KG/M2 | DIASTOLIC BLOOD PRESSURE: 76 MMHG | SYSTOLIC BLOOD PRESSURE: 139 MMHG

## 2018-01-26 DIAGNOSIS — I10 HYPERTENSION GOAL BP (BLOOD PRESSURE) < 140/90: Primary | Chronic | ICD-10-CM

## 2018-01-26 NOTE — MR AVS SNAPSHOT
After Visit Summary   1/26/2018    Marie Kaiser    MRN: [id number]           Patient Information     Date Of Birth          1955        Visit Information        Provider Department      1/26/2018 10:00 AM Bebe Trimble APRN CNP [facility]        Today's Diagnoses     Hypertension goal BP (blood pressure) < 140/90    -  1      Care Instructions    At [facility], we strive to deliver an exceptional experience to you, every time we see you.  If you receive a survey in the mail, please send us back your thoughts. We really do value your feedback.    Based on your medical history, these are the current health maintenance/preventive care services that you are due for (some may have been done at this visit.)  Health Maintenance Due   Topic Date Due     HEPATITIS C SCREENING  10/08/1972     ASTHMA ACTION PLAN Q1 YR  05/27/2016     ADVANCE DIRECTIVE PLANNING Q5 YRS  06/21/2016     MAMMO SCREEN Q2 YR (SYSTEM ASSIGNED)  05/29/2017     INFLUENZA VACCINE (SYSTEM ASSIGNED)  09/01/2017         Suggested websites for health information:  Www.easyOwn.it.ProtectWise : Up to date and easily searchable information on multiple topics.  Www.medlineplus.gov : medication info, interactive tutorials, watch real surgeries online  Www.familydoctor.org : good info from the Academy of Family Physicians  Www.cdc.gov : public health info, travel advisories, epidemics (H1N1)  Www.aap.org : children's health info, normal development, vaccinations  Www.health.[facility].[place].us : [place] dept of health, public health issues in [place], N1N1    Your care team:                            Family Medicine Internal Medicine   MD Venu Edge MD Shantel Branch-Fleming, MD Katya Georgiev PA-C Nam Ho, MD Pediatrics   CAS France, MD Bre Carroll CNP, MD Deborah Mielke, MD Kim Thein, APRN CNP      Clinic hours:  Monday - Thursday 7 am-7 pm; Fridays 7 am-5 pm.   Urgent care: Monday - Friday 11 am-9 pm; Saturday and Sunday 9 am-5 pm.  Pharmacy : Monday -Thursday 8 am-8 pm; Friday 8 am-6 pm; Saturday and Sunday 9 am-5 pm.     Clinic: [phone]   Pharmacy: [phone]  [facility] Hypertension Study  Visit 2     Thank you for your continued participation in the hypertension study!  Please continue taking your hypertension medications as directed. Your next visit will be in four weeks and will be scheduled for you in the coming days. After it is scheduled, be sure to let the research coordinator know as soon as possible if you cannot make it so that the visit can be rescheduled for you.     Please contact the research coordinator if you receive care for your hypertension outside of your study visits.    If you have any questions, please contact the research coordinator at [phone]. If you experience any symptoms please call the [employer]Flowâ„¢ 24/7 triage number at [phone]. If your phone number, email or address changes please alert the research coordinator.     In the meantime, we ask that you complete the online surveys emailed out to  you, if completing online, or mailed out to you, if completing paper surveys,  before your next visit.    Lifestyle changes that can help control high blood pressure:  Even though [facility] is a study to test effectiveness of genetically guided medications for managing high blood pressure, there are several things you can do to ensure your blood pressure stays in good control:    Maintain a healthy weight (BMI<26). A modest amount of weight loss can be helpful    Limit salt intake to under 2400mg daily    Follow the DASH diet (lean meats, low salt, whole grains, lots of fruits/vegies)    Stay active, try to get in 30 minutes of exercise daily.    Manage your daily stress.    Do not smoke cigarettes (or cut back)    Limit alcohol (2 drinks/day for men, 1 drink/day for women)            Follow-ups  "after your visit        Your next 10 appointments already scheduled     Mar 09, 2018  8:45 AM CST   Return Visit with Trudi Santizo MD   [facility]    [address] [place] [address] [phone]              Who to contact     If you have questions or need follow up information about today's clinic visit or your schedule please contact [facility] directly at [phone].  Normal or non-critical lab and imaging results will be communicated to you by [employer], letter or phone within 4 business days after the clinic has received the results. If you do not hear from us within 7 days, please contact the clinic through [employer] or phone. If you have a critical or abnormal lab result, we will notify you by phone as soon as possible.  Submit refill requests through myDocket or call your pharmacy and they will forward the refill request to us. Please allow 3 business days for your refill to be completed.          Additional Information About Your Visit        [employer] Information     myDocket gives you secure access to your electronic health record. If you see a primary care provider, you can also send messages to your care team and make appointments. If you have questions, please call your primary care clinic.  If you do not have a primary care provider, please call [phone] and they will assist you.        Care EveryWhere ID     This is your Care EveryWhere ID. This could be used by other organizations to access your [place] medical records  [id number]        Your Vitals Were     Pulse Temperature Height Pulse Oximetry Breastfeeding? BMI (Body Mass Index)    75 98.4  F (36.9  C) (Oral) 5' 9.69\" (1.77 m) 96% No 34.02 kg/m2       Blood Pressure from Last 3 Encounters:   01/26/18 139/76   01/17/18 146/88   12/14/17 152/80    Weight from Last 3 Encounters:   01/26/18 235 lb (106.6 kg)   01/17/18 239 lb (108.4 kg)   12/14/17 234 lb 8 oz " (106.4 kg)              Today, you had the following     No orders found for display       Primary Care Provider Office Phone # Fax #    Rosalinda Muse [phone] [address]  [facility] [address]        Equal Access to Services     JANE LIND : Hadii aad ku hadasho Soomaali, waaxda luqadaha, qaybta kaalmada adeegyada, waxay idiin hayaan adeeg khnatalee lajonathanjing . So [facility] [phone].    ATENCIÓN: Si habla español, tiene a novoa disposición servicios gratuitos de asistencia lingüística. Llame al [phone].    We comply with applicable federal civil rights laws and [place] laws. We do not discriminate on the basis of race, color, national origin, age, disability, sex, sexual orientation, or gender identity.            Thank you!     Thank you for choosing [facility]  for your care. Our goal is always to provide you with excellent care. Hearing back from our patients is one way we can continue to improve our services. Please take a few minutes to complete the written survey that you may receive in the mail after your visit with us. Thank you!             Your Updated Medication List - Protect others around you: Learn how to safely use, store and throw away your medicines at www.disposemymeds.org.          This list is accurate as of 1/26/18 10:45 AM.  Always use your most recent med list.                   Brand Name Dispense Instructions for use Diagnosis    ARMOUR THYROID 30 MG tablet   Generic drug:  thyroid     90 tablet    TAKE 1 TABLET BY MOUTH ONCE DAILY    Hypothyroidism due to acquired atrophy of thyroid       EPINEPHrine 0.3 MG/0.3ML injection 2-pack    EPIPEN/ADRENACLICK/or ANY BX GENERIC EQUIV    0.6 mL    Inject 0.3 mLs (0.3 mg) into the muscle once as needed for anaphylaxis    Food allergy       felodipine ER 5 MG 24 hr tablet    PLENDIL    30 tablet    Take 1 tablet (5 mg) by mouth daily    Hypertension goal BP (blood pressure) < 140/90        fexofenadine 180 MG tablet    ALLEGRA    30 tablet    Take 1 tablet (180 mg) by mouth daily    Nasal congestion       loratadine 10 MG tablet    CLARITIN    90 tablet    Take 1 tablet (10 mg) by mouth daily    Nasal congestion       order for DME      Equipment ordered: Respironics Auto PAP Mask type: Nasal Settings: 5-15 cm

## 2018-01-26 NOTE — PROGRESS NOTES
SUBJECTIVE:                                                    Marie Kaiser is a 62 year old female who presents to clinic today for the following health issues:      Hypertension Follow-up      Outpatient blood pressures are being checked at home.  Results are under 140/90.    Low Salt Diet: low salt      Amount of exercise or physical activity: 1 day/week for an average of 45-60 minutes    Problems taking medications regularly: No    Medication side effects: none    Diet: regular (no restrictions)      PGEN study visit  UNCONTROLLED HYPERTENSION ARM visit 2    SUBJECTIVE:  Marie is here for 2nd PGEN research study visit. Please refer to research tab in the header and today's flow sheet for further details. Patient had uncontrolled  hypertension at enrollment and has a goal of <  140/90 (per Problem list target chosen by PCP)     Prior research note reviewed. Patient is on blood pressure medication(s) at this time.  she has been taking Felodipine 5 mg daily and is tolerating the medication well.      Current diet & lifestyle: low salt, no daily , gluten free, drinks almond or coconut milk, chicken, fish, daily protein drink, clean/organic diet.  Smoking: no  Alcohol consumption no  Other pertinent history Patient has joined the [employer] and exercises 60 min/day.     BP Readings from Last 3 Encounters:   01/26/18 139/76   01/17/18 146/88   12/14/17 152/80       Current Outpatient Prescriptions   Medication Sig Dispense Refill     felodipine ER (PLENDIL) 5 MG 24 hr tablet Take 1 tablet (5 mg) by mouth daily 30 tablet 1     ARMOUR THYROID 30 MG tablet TAKE 1 TABLET BY MOUTH ONCE DAILY 90 tablet 0     fexofenadine (ALLEGRA) 180 MG tablet Take 1 tablet (180 mg) by mouth daily (Patient not taking: Reported on 12/14/2017) 30 tablet 1     order for DME Equipment ordered: Respironics Auto PAP Mask type: Nasal Settings: 5-15 cm       loratadine (CLARITIN) 10 MG tablet Take 1 tablet (10 mg) by mouth daily (Patient not  "taking: Reported on 12/14/2017) 90 tablet 1     EPINEPHrine 0.3 MG/0.3ML injection Inject 0.3 mLs (0.3 mg) into the muscle once as needed for anaphylaxis 0.6 mL 1     Potassium   Date Value Ref Range Status   12/14/2017 3.9 3.4 - 5.3 mmol/L Final     Creatinine   Date Value Ref Range Status   12/14/2017 0.77 0.52 - 1.04 mg/dL Final     Urea Nitrogen   Date Value Ref Range Status   12/14/2017 22 7 - 30 mg/dL Final     GFR Estimate   Date Value Ref Range Status   12/14/2017 75 >60 mL/min/1.7m2 Final     Comment:     Non  GFR Calc      A baseline potassium, creatinine, BUN, GFR has been done within past 12 months      OBJECTIVE:  Patient in in no apparent distress and able to provide full history for today's encounter. she denies pain or any current illness   Vitals: /76 (BP Location: Left arm, Patient Position: Chair, Cuff Size: Adult Large)  Pulse 75  Temp 98.4  F (36.9  C) (Oral)  Ht 5' 9.69\" (1.77 m)  Wt 235 lb (106.6 kg)  SpO2 96%  Breastfeeding? No  BMI 34.02 kg/m2  Today's BP completed using cuff size: regular on left side  arm.    Pulse Readings from Last 1 Encounters:   01/26/18 75     Is pulse 55 or greater? - Yes    BMI= Body mass index is 34.02 kg/(m^2).  Other exam findings          ASSESSMENT/PLAN:   PGEN Flowsheet has been  'filed' ) for this visit (this saves flowsheet data)    Blood pressure reading today is at the provider specified goal of <140/90.      1.  Based on PGEN RN HTN MGMT standing order the patient will be advised continue current medication regimen unchanged.     2.  We will not be checking a metabolic lab panel today.      3.  Follow up instructions include:     Next Nurse visit: 1 month.    See avs for more details.  Full research packet also provide to patient previously  Our research team will reach out to patient to schedule follow up visit as well.   Patient was counseled regarding the lifestyle changes (listed below)  to help with BP management " Yes  Lifestyle changes that can help control high blood pressure:  Even though PGEN is a study to test effectiveness of genetically guided medications for managing high blood pressure, there are several things you can do to ensure your blood pressure stays in good control:    Maintain a healthy weight (BMI<26). A modest amount of weight loss can be helpful    Limit salt intake to under 2400mg daily    Follow the DASH diet (lean meats, low salt, whole grains, lots of fruits/vegies)    Stay active, try to get in 30 minutes of exercise daily.    Manage your daily stress.    Do not smoke cigarettes (or cut back)    Limit alcohol (2 drinks/day for men, 1 drink/day for women)  Was AVS  provided to patient with the relevant <dot>PGENPI dot phrase pulled into patient instructions Yes    Bebe ATKINSON, CNP

## 2018-01-26 NOTE — PATIENT INSTRUCTIONS
At [facility], we strive to deliver an exceptional experience to you, every time we see you.  If you receive a survey in the mail, please send us back your thoughts. We really do value your feedback.    Based on your medical history, these are the current health maintenance/preventive care services that you are due for (some may have been done at this visit.)  Health Maintenance Due   Topic Date Due     HEPATITIS C SCREENING  10/08/1972     ASTHMA ACTION PLAN Q1 YR  05/27/2016     ADVANCE DIRECTIVE PLANNING Q5 YRS  06/21/2016     MAMMO SCREEN Q2 YR (SYSTEM ASSIGNED)  05/29/2017     INFLUENZA VACCINE (SYSTEM ASSIGNED)  09/01/2017         Suggested websites for health information:  Www.Local Funeral.GlobeSherpa : Up to date and easily searchable information on multiple topics.  Www.[employer].gov : medication info, interactive tutorials, watch real surgeries online  Www.familydoctor.org : good info from the Academy of Family Physicians  Www.cdc.gov : public health info, travel advisories, epidemics (H1N1)  Www.aap.org : children's health info, normal development, vaccinations  Www.health.[facility].[place].us : [place] dept of health, public health issues in [place], N1N1    Your care team:                            Family Medicine Internal Medicine   MD Venu Edge MD Shantel Branch-Fleming, MD Katya Georgiev PA-C Nam Ho, MD Pediatrics   CAS France, SANTOS Chilel APRN MD Bre Sharp MD Deborah Mielke, MD Kim Thein, APRN [facility]      Clinic hours: Monday - Thursday 7 am-7 pm; Fridays 7 am-5 pm.   Urgent care: Monday - Friday 11 am-9 pm; Saturday and Sunday 9 am-5 pm.  Pharmacy : Monday -Thursday 8 am-8 pm; Friday 8 am-6 pm; Saturday and Sunday 9 am-5 pm.     Clinic: [phone]   Pharmacy: [phone]  [facility] Hypertension Study  Visit 2     Thank you for your continued participation in the hypertension study!  Please continue taking your  hypertension medications as directed. Your next visit will be in four weeks and will be scheduled for you in the coming days. After it is scheduled, be sure to let the research coordinator know as soon as possible if you cannot make it so that the visit can be rescheduled for you.     Please contact the research coordinator if you receive care for your hypertension outside of your study visits.    If you have any questions, please contact the research coordinator at [phone]. If you experience any symptoms please call the [employer] 24/7 triage number at [phone]. If your phone number, email or address changes please alert the research coordinator.     In the meantime, we ask that you complete the online surveys emailed out to  you, if completing online, or mailed out to you, if completing paper surveys,  before your next visit.    Lifestyle changes that can help control high blood pressure:  Even though PGEN is a study to test effectiveness of genetically guided medications for managing high blood pressure, there are several things you can do to ensure your blood pressure stays in good control:    Maintain a healthy weight (BMI<26). A modest amount of weight loss can be helpful    Limit salt intake to under 2400mg daily    Follow the DASH diet (lean meats, low salt, whole grains, lots of fruits/vegies)    Stay active, try to get in 30 minutes of exercise daily.    Manage your daily stress.    Do not smoke cigarettes (or cut back)    Limit alcohol (2 drinks/day for men, 1 drink/day for women)

## 2018-02-05 DIAGNOSIS — E03.4 HYPOTHYROIDISM DUE TO ACQUIRED ATROPHY OF THYROID: Chronic | ICD-10-CM

## 2018-02-05 NOTE — TELEPHONE ENCOUNTER
Requested Prescriptions   Pending Prescriptions Disp Refills     ARMOUR THYROID 30 MG tablet [Pharmacy Med Name: ARMOUR THYROID 30 MG TABLET] 90 tablet 0     Sig: TAKE 1 TABLET BY MOUTH ONCE DAILY    There is no refill protocol information for this order        ARMOUR THYROID 30 MG tablet  Last Written Prescription Date:  12/7/17  Last Fill Quantity: 90,  # refills: 0   Last Office Visit with [facility] provider:  1/26/18   Future Office Visit:    Next 5 appointments (look out 90 days)     Feb 23, 2018 10:20 AM CST   Office Visit with [place] Alfredo [facility]    [address] [address]   [phone]            Mar 09, 2018  8:45 AM CST   Return Visit with Trudi Santizo MD   [facility] ([facility]    [address] [place] [address]   [phone]

## 2018-02-07 RX ORDER — THYROID 30 MG/1
TABLET ORAL
Qty: 90 TABLET | Refills: 3 | Status: SHIPPED | OUTPATIENT
Start: 2018-02-07 | End: 2018-05-14 | Stop reason: DRUGHIGH

## 2018-02-08 NOTE — TELEPHONE ENCOUNTER
TSH   Date Value Ref Range Status   01/02/2018 2.18 0.40 - 4.00 mU/L Final   ]      Refill sent. TOMÁS

## 2018-02-23 ENCOUNTER — TELEPHONE (OUTPATIENT)
Dept: FAMILY MEDICINE | Facility: CLINIC | Age: 63
End: 2018-02-23

## 2018-02-23 NOTE — TELEPHONE ENCOUNTER
Patient missed her PGEN appointment with Theresa Trimble.  Called and spoke to patient and gave her Elisabeth's  #, [phone] to re-schedule her appointment.  Eduarda Quispe MA/  For Teams Crystal

## 2018-02-23 NOTE — TELEPHONE ENCOUNTER
Patient sorry she missed her appointment today  Can someone call her to set up new appointment    Ok to leave message  [phone] (H)

## 2018-03-02 ENCOUNTER — OFFICE VISIT (OUTPATIENT)
Dept: FAMILY MEDICINE | Facility: CLINIC | Age: 63
End: 2018-03-02

## 2018-03-02 VITALS
WEIGHT: 235 LBS | HEART RATE: 78 BPM | HEIGHT: 70 IN | TEMPERATURE: 98.2 F | OXYGEN SATURATION: 97 % | DIASTOLIC BLOOD PRESSURE: 78 MMHG | SYSTOLIC BLOOD PRESSURE: 136 MMHG | BODY MASS INDEX: 33.64 KG/M2

## 2018-03-02 DIAGNOSIS — Z00.6 RESEARCH STUDY PATIENT: Primary | ICD-10-CM

## 2018-03-02 DIAGNOSIS — I10 HYPERTENSION GOAL BP (BLOOD PRESSURE) < 140/90: Chronic | ICD-10-CM

## 2018-03-02 NOTE — MR AVS SNAPSHOT
After Visit Summary   3/2/2018    Marie Kaiser    MRN: [id number]           Patient Information     Date Of Birth          1955        Visit Information        Provider Department      3/2/2018 1:20 PM Bebe Trimble APRN CNP [facility]        Today's Diagnoses     Research study patient    -  1    Hypertension goal BP (blood pressure) < 140/90          Care Instructions    At [facility], we strive to deliver an exceptional experience to you, every time we see you.  If you receive a survey in the mail, please send us back your thoughts. We really do value your feedback.    Based on your medical history, these are the current health maintenance/preventive care services that you are due for (some may have been done at this visit.)  Health Maintenance Due   Topic Date Due     HEPATITIS C SCREENING  10/08/1972     ASTHMA ACTION PLAN Q1 YR  05/27/2016     ADVANCE DIRECTIVE PLANNING Q5 YRS  06/21/2016     MAMMO SCREEN Q2 YR (SYSTEM ASSIGNED)  05/29/2017     ASTHMA CONTROL TEST Q6 MOS  02/28/2018         Suggested websites for health information:  Www.TaoTaoSou.GoldSpot Media : Up to date and easily searchable information on multiple topics.  Www.medlineplus.gov : medication info, interactive tutorials, watch real surgeries online  Www.familydoctor.org : good info from the Academy of Family Physicians  Www.cdc.gov : public health info, travel advisories, epidemics (H1N1)  Www.aap.org : children's health info, normal development, vaccinations  Www.health.state[place]us : [place] dept of health, public health issues in [place], N1N1    Your care team:                            Family Medicine Internal Medicine   MD Venu Edge MD Shantel Branch-Fleming, MD Katya Georgiev PA-C Nam Ho, MD Pediatrics   CAS France, MD Bre Carroll CNP, MD Deborah Mielke, MD Kim Thein, [place] CNP       Clinic hours: Monday - Thursday 7 am-7 pm; Fridays 7 am-5 pm.   Urgent care: Monday - Friday 11 am-9 pm; Saturday and Sunday 9 am-5 pm.  Pharmacy : Monday -Thursday 8 am-8 pm; Friday 8 am-6 pm; Saturday and Sunday 9 am-5 pm.     Clinic: [phone]   Pharmacy: [phone]  [facility] Hypertension Study   Visit 3     Thank you for your continued participation in the hypertension study!  Please continue taking your hypertension medication as directed. Your next visit will be in four weeks and will be scheduled for you in the coming days. After scheduled, be sure to let the research coordinator know as soon as possible if you cannot make it so that the visit can be rescheduled for you.     Please contact the research coordinator if you receive care for your hypertension outside of your study visits.    If you have any questions, please contact the research coordinator at [phone]. If you experience any symptoms please call the [employer] 24/7 triage number at [phone]. If your phone number, email or address changes please alert the research coordinator.     In the meantime, we ask that you complete the online surveys emailed out to  you, if completing online, or mailed out to you, if completing paper surveys,  before your next visit.    Lifestyle changes that can help control high blood pressure:  Even though [facility] is a study to test effectiveness of genetically guided medications for managing high blood pressure, there are several things you can do to ensure your blood pressure stays in good control:    Maintain a healthy weight (BMI<26). A modest amount of weight loss can be helpful    Limit salt intake to under 2400mg daily    Follow the DASH diet (lean meats, low salt, whole grains, lots of fruits/vegies)    Stay active, try to get in 30 minutes of exercise daily.    Manage your daily stress.    Do not smoke cigarettes (or cut back)    Limit alcohol (2 drinks/day for men, 1 drink/day for women)          "   Follow-ups after your visit        Your next 10 appointments already scheduled     Mar 09, 2018  8:45 AM CST   Return Visit with Trudi Santizo MD   [facility] ([facility])    [address] [address] [place] [id number] [phone]              Who to contact     If you have questions or need follow up information about today's clinic visit or your schedule please contact [facility] directly at [phone].  Normal or non-critical lab and imaging results will be communicated to you by Booshakahart, letter or phone within 4 business days after the clinic has received the results. If you do not hear from us within 7 days, please contact the clinic through [employer] or phone. If you have a critical or abnormal lab result, we will notify you by phone as soon as possible.  Submit refill requests through [employer] or call your pharmacy and they will forward the refill request to us. Please allow 3 business days for your refill to be completed.          Additional Information About Your Visit        Booshakahart Information     [employer] gives you secure access to your electronic health record. If you see a primary care provider, you can also send messages to your care team and make appointments. If you have questions, please call your primary care clinic.  If you do not have a primary care provider, please call [phone] and they will assist you.        Care EveryWhere ID     This is your Care EveryWhere ID. This could be used by other organizations to access your [place] medical records  [id number]        Your Vitals Were     Pulse Temperature Height Pulse Oximetry BMI (Body Mass Index)       78 98.2  F (36.8  C) (Oral) 5' 9.69\" (1.77 m) 97% 34.02 kg/m2        Blood Pressure from Last 3 Encounters:   03/02/18 136/78   01/26/18 139/76   01/17/18 146/88    Weight from Last 3 Encounters:   03/02/18 235 lb (106.6 kg)   01/26/18 235 lb (106.6 kg)   01/17/18 239 lb (108.4 " kg)              Today, you had the following     No orders found for display       Primary Care Provider Office Phone # Fax #    Rosalinda Muse [phone] [address]  [facility] [address]        Equal Access to Services     JANE LIND : Hadii aad ku hadrosalioo Soomaali, waaxda luqadaha, qaybta kaalmada adeegyada, waxashley idiin hayaan adedelfina melgar lajose angel . So [facility] [phone].    ATENCIÓN: Si habla español, tiene a novoa disposición servicios gratuitos de asistencia lingüística. Llame al [phone].    We comply with applicable federal civil rights laws and [place] laws. We do not discriminate on the basis of race, color, national origin, age, disability, sex, sexual orientation, or gender identity.            Thank you!     Thank you for choosing [facility]  for your care. Our goal is always to provide you with excellent care. Hearing back from our patients is one way we can continue to improve our services. Please take a few minutes to complete the written survey that you may receive in the mail after your visit with us. Thank you!             Your Updated Medication List - Protect others around you: Learn how to safely use, store and throw away your medicines at www.disposemymeds.org.          This list is accurate as of 3/2/18  1:44 PM.  Always use your most recent med list.                   Brand Name Dispense Instructions for use Diagnosis    ARMOUR THYROID 30 MG tablet   Generic drug:  thyroid     90 tablet    TAKE 1 TABLET BY MOUTH ONCE DAILY    Hypothyroidism due to acquired atrophy of thyroid       EPINEPHrine 0.3 MG/0.3ML injection 2-pack    EPIPEN/ADRENACLICK/or ANY BX GENERIC EQUIV    0.6 mL    Inject 0.3 mLs (0.3 mg) into the muscle once as needed for anaphylaxis    Food allergy       felodipine ER 5 MG 24 hr tablet    PLENDIL    30 tablet    Take 1 tablet (5 mg) by mouth daily    Hypertension goal BP (blood pressure) < 140/90       fexofenadine  180 MG tablet    ALLEGRA    30 tablet    Take 1 tablet (180 mg) by mouth daily    Nasal congestion       loratadine 10 MG tablet    CLARITIN    90 tablet    Take 1 tablet (10 mg) by mouth daily    Nasal congestion       order for DME      Equipment ordered: Respironics Auto PAP Mask type: Nasal Settings: 5-15 cm

## 2018-03-02 NOTE — PROGRESS NOTES
SUBJECTIVE:                                                      PGEN study visit  UNCONTROLLED HYPERTENSION ARM visit 3    SUBJECTIVE:  Marie is here for 3rd PGEN research study visit. Please refer to research tab in the header and today's flow sheet for further details. Patient had uncontrolled  hypertension at enrollment and has a goal of <  140/90 (per Problem list target chosen by PCP)     Prior research note reviewed. Patient is on blood pressure medication(s) at this time.  she has been taking Felodipine ER 5 mg daily and is tolerating the medication well.      Current diet & lifestyle: low salt,gluten free, drinks almond or coconut milk, chicken, fish, daily protein drink, clean/organic diet.   Smoking: no  Alcohol consumption no  Other pertinent [facility]      BP Readings from Last 3 Encounters:   03/02/18 136/82   01/26/18 139/76   01/17/18 146/88       Current Outpatient Prescriptions   Medication Sig Dispense Refill     ARMOUR THYROID 30 MG tablet TAKE 1 TABLET BY MOUTH ONCE DAILY 90 tablet 3     felodipine ER (PLENDIL) 5 MG 24 hr tablet Take 1 tablet (5 mg) by mouth daily 30 tablet 1     fexofenadine (ALLEGRA) 180 MG tablet Take 1 tablet (180 mg) by mouth daily 30 tablet 1     order for DME Equipment ordered: Respironics Auto PAP Mask type: Nasal Settings: 5-15 cm       loratadine (CLARITIN) 10 MG tablet Take 1 tablet (10 mg) by mouth daily 90 tablet 1     EPINEPHrine 0.3 MG/0.3ML injection Inject 0.3 mLs (0.3 mg) into the muscle once as needed for anaphylaxis 0.6 mL 1     Potassium   Date Value Ref Range Status   12/14/2017 3.9 3.4 - 5.3 mmol/L Final     Creatinine   Date Value Ref Range Status   12/14/2017 0.77 0.52 - 1.04 mg/dL Final     Urea Nitrogen   Date Value Ref Range Status   12/14/2017 22 7 - 30 mg/dL Final     GFR Estimate   Date Value Ref Range Status   12/14/2017 75 >60 mL/min/1.7m2 Final     Comment:     Non  GFR Calc  "     A baseline potassium, creatinine, BUN, GFR has been done within past 12 months      OBJECTIVE:  Patient in in no apparent distress and able to provide full history for today's encounter. she denies pain or any current illness   Vitals: /82 (BP Location: Left arm, Patient Position: Chair, Cuff Size: Adult Large)  Pulse 78  Temp 98.2  F (36.8  C) (Oral)  Ht 5' 9.69\" (1.77 m)  Wt 235 lb (106.6 kg)  SpO2 97%  BMI 34.02 kg/m2  Today's BP completed using cuff size: large on left side  arm.    Pulse Readings from Last 1 Encounters:   03/02/18 78     Is pulse 55 or greater? - Yes    BMI= Body mass index is 34.02 kg/(m^2).  Other exam findings        ASSESSMENT/PLAN:   EMILEEN Flowsheet has been  'filed' ) for this visit (this saves flowsheet data)    Blood pressure reading today is at the provider specified goal of <140/90.      1.  Based on PGEN RN HTN MGMT standing order the patient will be advised continue current medication regimen unchanged.     2.  We will be checking a metabolic lab panel today.      3.  Follow up instructions include:     Next Nurse visit: 4 weeks.    See avs for more details.  Full research packet also provide to patient previously  Our research team will reach out to patient to schedule follow up visit as well.   Patient was counseled regarding the lifestyle changes (listed below)  to help with BP management Yes  Lifestyle changes that can help control high blood pressure:  Even though PGEN is a study to test effectiveness of genetically guided medications for managing high blood pressure, there are several things you can do to ensure your blood pressure stays in good control:    Maintain a healthy weight (BMI<26). A modest amount of weight loss can be helpful    Limit salt intake to under 2400mg daily    Follow the DASH diet (lean meats, low salt, whole grains, lots of fruits/vegies)    Stay active, try to get in 30 minutes of exercise daily.    Manage your daily stress.    Do not " smoke cigarettes (or cut back)    Limit alcohol (2 drinks/day for men, 1 drink/day for women)  Was AVS  provided to patient with the relevant <dot>PGENPI dot phrase pulled into patient instructions Yes    Bebe ATKINSON, CNP

## 2018-03-05 ENCOUNTER — DOCUMENTATION ONLY (OUTPATIENT)
Dept: LAB | Facility: CLINIC | Age: 63
End: 2018-03-05

## 2018-03-05 NOTE — PROGRESS NOTES
This patient has overdue labs. A letter was sent on 1/27/2018 and there has been no lab appointment made. If you still want these labs done, please have your care team contact the patient to make a lab appointment. Otherwise, please have the labs discontinued and close the encounter.    Thank you,  [facility]

## 2018-03-12 DIAGNOSIS — I10 HYPERTENSION GOAL BP (BLOOD PRESSURE) < 140/90: Chronic | ICD-10-CM

## 2018-03-12 NOTE — TELEPHONE ENCOUNTER
"Requested Prescriptions   Pending Prescriptions Disp Refills     felodipine ER (PLENDIL) 5 MG 24 hr tablet 30 tablet 1     Sig: Take 1 tablet (5 mg) by mouth daily    Calcium Channel Blockers Protocol  Passed    3/12/2018 11:43 AM       Passed - Blood pressure under 140/90 in past 12 months    BP Readings from Last 3 Encounters:   03/02/18 136/78   01/26/18 139/76   01/17/18 146/88                Passed - Recent (12 mo) or future (30 days) visit within the authorizing provider's specialty    Patient had office visit in the last 12 months or has a visit in the next 30 days with authorizing provider or within the authorizing provider's specialty.  See \"Patient Info\" tab in inbasket, or \"Choose Columns\" in Meds & Orders section of the refill encounter.           Passed - Patient is age 18 or older       Passed - No active pregnancy on record       Passed - Normal serum creatinine on file in past 12 months    Recent Labs   Lab Test  12/14/17   1432   CR  0.77            Passed - No positive pregnancy test in past 12 months        felodipine ER (PLENDIL) 5 MG 24 hr tablet  Last Written Prescription Date:  1/17/18  Last Fill Quantity: 30,  # refills: 1   Last office visit: No previous visit found with prescribing provider:  Dr. Muse   Future Office Visit:      "

## 2018-03-13 RX ORDER — FELODIPINE 5 MG/1
5 TABLET, EXTENDED RELEASE ORAL DAILY
Qty: 30 TABLET | Refills: 1 | Status: SHIPPED | OUTPATIENT
Start: 2018-03-13 | End: 2018-05-05

## 2018-03-13 NOTE — TELEPHONE ENCOUNTER
Prescription approved per [facility] Refill Protocol.    Julien Peña RN, BSN

## 2018-03-20 DIAGNOSIS — Z91.018 FOOD ALLERGY: ICD-10-CM

## 2018-03-20 RX ORDER — EPINEPHRINE 0.3 MG/.3ML
INJECTION SUBCUTANEOUS
Qty: 0.6 ML | Refills: 0 | Status: SHIPPED | OUTPATIENT
Start: 2018-03-20 | End: 2019-08-26

## 2018-03-20 NOTE — TELEPHONE ENCOUNTER
"Requested Prescriptions   Pending Prescriptions Disp Refills     EPINEPHrine (EPIPEN/ADRENACLICK/OR ANY BX GENERIC EQUIV) 0.3 MG/0.3ML injection 2-pack [Pharmacy Med Name: EPINEPHRINE 0.3 MG AUTO-INJECT]  Last Written Prescription Date:  1/16/17  Last Fill Quantity: 0.6 mL,  # refills: 1   Last office visit: Dr. Trimble 3/02/18  Future Office Visit:   Next 5 appointments (look out 90 days)     Mar 30, 2018 10:20 AM CDT   Nurse Only with [place] Alfredo [facility] ([facility])    [address] [place] [id number] [phone]                0     Sig: INJECT 0.3 MLS (0.3 MG) INTO THE MUSCLE ONCE AS NEEDED FOR ANAPHYLAXIS    Anaphylaxis Kits Protocol Passed    3/20/2018 10:49 AM       Passed - Recent (12 mo) or future (30 days) visit witin the authorizing provider's specialty    Patient had office visit in the last 12 months or has a visit in the next 30 days with authorizing provider or within the authorizing provider's specialty.  See \"Patient Info\" tab in inbasket, or \"Choose Columns\" in Meds & Orders section of the refill encounter.           Passed - Patient is age 5 or older          "

## 2018-03-20 NOTE — TELEPHONE ENCOUNTER
Prescription approved per [facility] Refill Protocol.    Julien Peña RN, BSN

## 2018-03-30 ENCOUNTER — ALLIED HEALTH/NURSE VISIT (OUTPATIENT)
Dept: FAMILY MEDICINE | Facility: CLINIC | Age: 63
End: 2018-03-30

## 2018-03-30 VITALS
HEIGHT: 70 IN | SYSTOLIC BLOOD PRESSURE: 132 MMHG | TEMPERATURE: 97.5 F | BODY MASS INDEX: 33.93 KG/M2 | OXYGEN SATURATION: 96 % | HEART RATE: 78 BPM | WEIGHT: 237 LBS | DIASTOLIC BLOOD PRESSURE: 72 MMHG

## 2018-03-30 DIAGNOSIS — I10 HYPERTENSION GOAL BP (BLOOD PRESSURE) < 140/90: Primary | Chronic | ICD-10-CM

## 2018-03-30 NOTE — MR AVS SNAPSHOT
After Visit Summary   3/30/2018    Marie Kaiser    MRN: [id number]           Patient Information     Date Of Birth          1955        Visit Information        Provider Department      3/30/2018 10:20 AM Bebe Trimble APRN CNP [facility]        Today's Diagnoses     Hypertension goal BP (blood pressure) < 140/90    -  1      Care Instructions    At [facility], we strive to deliver an exceptional experience to you, every time we see you.  If you receive a survey in the mail, please send us back your thoughts. We really do value your feedback.    Based on your medical history, these are the current health maintenance/preventive care services that you are due for (some may have been done at this visit.)  Health Maintenance Due   Topic Date Due     HEPATITIS C SCREENING  10/08/1972     ASTHMA ACTION PLAN Q1 YR  05/27/2016     ADVANCE DIRECTIVE PLANNING Q5 YRS  06/21/2016     MAMMO SCREEN Q2 YR (SYSTEM ASSIGNED)  05/29/2017     ASTHMA CONTROL TEST Q6 MOS  02/28/2018         Suggested websites for health information:  Www.Wandera.Intellione : Up to date and easily searchable information on multiple topics.  Www.medlineplus.gov : medication info, interactive tutorials, watch real surgeries online  Www.familydoctor.org : good info from the Academy of Family Physicians  Www.cdc.gov : public health info, travel advisories, epidemics (H1N1)  Www.aap.org : children's health info, normal development, vaccinations  Www.health.[facility].[place].us : [place] dept of health, public health issues in [place], N1N1    Your care team:                            Family Medicine Internal Medicine   MD Venu Edge MD Shantel Branch-Fleming, MD Katya Georgiev PA-C Nam Ho, MD Pediatrics   CAS France, MD Bre Carroll CNP, MD Deborah Mielke, MD Kim Thein, APRN CNP      Clinic hours: Monday -  Thursday 7 am-7 pm; Fridays 7 am-5 pm.   Urgent care: Monday - Friday 11 am-9 pm; Saturday and Sunday 9 am-5 pm.  Pharmacy : Monday -Thursday 8 am-8 pm; Friday 8 am-6 pm; Saturday and Sunday 9 am-5 pm.     Clinic: [phone]   Pharmacy: [phone]  [facility] Hypertension Study   Visit 4     Thank you for your continued participation in the hypertension study!  Please continue taking your hypertension medications as directed. Your next visit will be in one and a half months and will be scheduled for you in the coming days.     After it is scheduled, be sure to let the research coordinator know as soon as possible if you cannot make it so that the visit can be rescheduled for you.     Please contact the research coordinator if you receive care for your hypertension outside of your study visits.    If you have any questions, please contact the research coordinator at [phone]. If you experience any symptoms please call the "[employer] 24/7 triage number at [phone]. If your phone number, email or address changes please alert the research coordinator.     In the meantime, we ask that you complete the online surveys emailed out to  you, if completing online, or mailed out to you, if completing paper surveys,  before your next visit.    Lifestyle changes that can help control high blood pressure:  Even though [facility] is a study to test effectiveness of genetically guided medications for managing high blood pressure, there are several things you can do to ensure your blood pressure stays in good control:    Maintain a healthy weight (BMI<26). A modest amount of weight loss can be helpful    Limit salt intake to under 2400mg daily    Follow the DASH diet (lean meats, low salt, whole grains, lots of fruits/vegies)    Stay active, try to get in 30 minutes of exercise daily.    Manage your daily stress.    Do not smoke cigarettes (or cut back)    Limit alcohol (2 drinks/day for men, 1 drink/day for women)             "Follow-ups after your visit        Follow-up notes from your care team     Return in about 6 weeks (around 5/11/2018), or if symptoms worsen or fail to improve.      Who to contact     If you have questions or need follow up information about today's clinic visit or your schedule please contact [facility] directly at [phone].  Normal or non-critical lab and imaging results will be communicated to you by MyChart, letter or phone within 4 business days after the clinic has received the results. If you do not hear from us within 7 days, please contact the clinic through Tribridge[employer] or phone. If you have a critical or abnormal lab result, we will notify you by phone as soon as possible.  Submit refill requests through [employer] or call your pharmacy and they will forward the refill request to us. Please allow 3 business days for your refill to be completed.          Additional Information About Your Visit        Tribridgehart Information     [employer] gives you secure access to your electronic health record. If you see a primary care provider, you can also send messages to your care team and make appointments. If you have questions, please call your primary care clinic.  If you do not have a primary care provider, please call [phone] and they will assist you.        Care EveryWhere ID     This is your Care EveryWhere ID. This could be used by other organizations to access your [place] medical records  [id number]        Your Vitals Were     Pulse Temperature Height Pulse Oximetry BMI (Body Mass Index)       78 97.5  F (36.4  C) (Tympanic) 5' 9.69\" (1.77 m) 96% 34.31 kg/m2        Blood Pressure from Last 3 Encounters:   03/30/18 132/72   03/02/18 136/78   01/26/18 139/76    Weight from Last 3 Encounters:   03/30/18 237 lb (107.5 kg)   03/02/18 235 lb (106.6 kg)   01/26/18 235 lb (106.6 kg)              Today, you had the following     No orders found for display       Primary Care Provider Office Phone " # Fax #    Rosalinda Muse [phone] [address]  [facility] [address]        Equal Access to Services     JANE LIND : Radha jeff stone aimee Sotiny, waaxda luqadaha, qaybta kaalmada pavithra, kandy melgar lajonathanjing rutledge. So [facility] [phone].    ATENCIÓN: Si habla español, tiene a novoa disposición servicios gratuitos de asistencia lingüística. Llame al [phone].    We comply with applicable federal civil rights laws and [place] laws. We do not discriminate on the basis of race, color, national origin, age, disability, sex, sexual orientation, or gender identity.            Thank you!     Thank you for choosing [facility]  for your care. Our goal is always to provide you with excellent care. Hearing back from our patients is one way we can continue to improve our services. Please take a few minutes to complete the written survey that you may receive in the mail after your visit with us. Thank you!             Your Updated Medication List - Protect others around you: Learn how to safely use, store and throw away your medicines at www.disposemymeds.org.          This list is accurate as of 3/30/18 11:11 AM.  Always use your most recent med list.                   Brand Name Dispense Instructions for use Diagnosis    ARMOUR THYROID 30 MG tablet   Generic drug:  thyroid     90 tablet    TAKE 1 TABLET BY MOUTH ONCE DAILY    Hypothyroidism due to acquired atrophy of thyroid       EPINEPHrine 0.3 MG/0.3ML injection 2-pack    EPIPEN/ADRENACLICK/or ANY BX GENERIC EQUIV    0.6 mL    INJECT 0.3 MLS (0.3 MG) INTO THE MUSCLE ONCE AS NEEDED FOR ANAPHYLAXIS    Food allergy       felodipine ER 5 MG 24 hr tablet    PLENDIL    30 tablet    Take 1 tablet (5 mg) by mouth daily    Hypertension goal BP (blood pressure) < 140/90       fexofenadine 180 MG tablet    ALLEGRA    30 tablet    Take 1 tablet (180 mg) by mouth daily    Nasal congestion       loratadine 10  MG tablet    CLARITIN    90 tablet    Take 1 tablet (10 mg) by mouth daily    Nasal congestion       order for DME      Equipment ordered: Respironics Auto PAP Mask type: Nasal Settings: 5-15 cm

## 2018-03-30 NOTE — PROGRESS NOTES
"PGEN study visit  UNCONTROLLED HYPERTENSION ARM visit 4    SUBJECTIVE:  Marie is here for 4th PGEN research study visit. Please refer to research tab in the header and today's flow sheet for further details. Patient had uncontrolled  hypertension at enrollment and has a goal of <  140/90 (per Problem list target chosen by PCP)     Prior research note reviewed. Patient is on blood pressure medication(s) at this time.  she has been taking Felodipine 5 mg daily and is tolerating the medication well.      Current diet & lifestyle: low salt, gluten free, almond or coconut mild, chicken, fish, daily protein drink, \"clean/organic diet\"  Smoking: no  Alcohol consumption no  Other pertinent history   exercising 3-4 days/week at the [employer] for 50-60 min.       BP Readings from Last 3 Encounters:   03/30/18 132/72   03/02/18 136/78   01/26/18 139/76       Current Outpatient Prescriptions   Medication Sig Dispense Refill     EPINEPHrine (EPIPEN/ADRENACLICK/OR ANY BX GENERIC EQUIV) 0.3 MG/0.3ML injection 2-pack INJECT 0.3 MLS (0.3 MG) INTO THE MUSCLE ONCE AS NEEDED FOR ANAPHYLAXIS 0.6 mL 0     felodipine ER (PLENDIL) 5 MG 24 hr tablet Take 1 tablet (5 mg) by mouth daily 30 tablet 1     ARMOUR THYROID 30 MG tablet TAKE 1 TABLET BY MOUTH ONCE DAILY 90 tablet 3     order for DME Equipment ordered: Respironics Auto PAP Mask type: Nasal Settings: 5-15 cm       loratadine (CLARITIN) 10 MG tablet Take 1 tablet (10 mg) by mouth daily 90 tablet 1     fexofenadine (ALLEGRA) 180 MG tablet Take 1 tablet (180 mg) by mouth daily (Patient not taking: Reported on 3/30/2018) 30 tablet 1     Potassium   Date Value Ref Range Status   12/14/2017 3.9 3.4 - 5.3 mmol/L Final     Creatinine   Date Value Ref Range Status   12/14/2017 0.77 0.52 - 1.04 mg/dL Final     Urea Nitrogen   Date Value Ref Range Status   12/14/2017 22 7 - 30 mg/dL Final     GFR Estimate   Date Value Ref Range Status   12/14/2017 75 >60 mL/min/1.7m2 Final     Comment:     Non  " "American GFR Calc      A baseline potassium, creatinine, BUN, GFR has been done within past 12 months      OBJECTIVE:  Patient in in no apparent distress and able to provide full history for today's encounter. she denies pain or any current illness   Vitals: /72 (BP Location: Left arm, Patient Position: Chair, Cuff Size: Adult Large)  Pulse 78  Temp 97.5  F (36.4  C) (Tympanic)  Ht 5' 9.69\" (1.77 m)  Wt 237 lb (107.5 kg)  SpO2 96%  BMI 34.31 kg/m2  Today's BP completed using cuff size: regular on left side  arm.    Pulse Readings from Last 1 Encounters:   03/30/18 78     Is pulse 55 or greater? - Yes    BMI= Body mass index is 34.31 kg/(m^2).  Other exam findings        ASSESSMENT/PLAN:   EMILEEN Flowsheet has been  'filed' ) for this visit (this saves flowsheet data)    Blood pressure reading today is at the provider specified goal of <140/90.      1.  Based on PGEN RN HTN MGMT standing order the patient will be advised continue current medication regimen unchanged.     2.  We will not be checking a metabolic lab panel today.      3.  Follow up instructions include:     Next Nurse visit: 6 weeks.    See avs for more details.  Full research packet also provide to patient previously  Our research team will reach out to patient to schedule follow up visit as well.   Patient was counseled regarding the lifestyle changes (listed below)  to help with BP management Yes  Lifestyle changes that can help control high blood pressure:  Even though PGEN is a study to test effectiveness of genetically guided medications for managing high blood pressure, there are several things you can do to ensure your blood pressure stays in good control:    Maintain a healthy weight (BMI<26). A modest amount of weight loss can be helpful    Limit salt intake to under 2400mg daily    Follow the DASH diet (lean meats, low salt, whole grains, lots of fruits/vegies)    Stay active, try to get in 30 minutes of exercise daily.    Manage " your daily stress.    Do not smoke cigarettes (or cut back)    Limit alcohol (2 drinks/day for men, 1 drink/day for women)  Was AVS  provided to patient with the relevant <dot>PGENPI dot phrase pulled into patient instructions Yes    Bebe ATKINSON, CNP

## 2018-03-30 NOTE — PATIENT INSTRUCTIONS
At [facility], we strive to deliver an exceptional experience to you, every time we see you.  If you receive a survey in the mail, please send us back your thoughts. We really do value your feedback.    Based on your medical history, these are the current health maintenance/preventive care services that you are due for (some may have been done at this visit.)  Health Maintenance Due   Topic Date Due     HEPATITIS C SCREENING  10/08/1972     ASTHMA ACTION PLAN Q1 YR  05/27/2016     ADVANCE DIRECTIVE PLANNING Q5 YRS  06/21/2016     MAMMO SCREEN Q2 YR (SYSTEM ASSIGNED)  05/29/2017     ASTHMA CONTROL TEST Q6 MOS  02/28/2018         Suggested websites for health information:  Www.Lifestyle Air.Zong : Up to date and easily searchable information on multiple topics.  Www.[employer].gov : medication info, interactive tutorials, watch real surgeries online  Www.familydoctor.org : good info from the Academy of Family Physicians  Www.cdc.gov : public health info, travel advisories, epidemics (H1N1)  Www.aap.org : children's health info, normal development, vaccinations  Www.health.[facility].[place].us : [place] dept of health, public health issues in [place], N1N1    Your care team:                            Family Medicine Internal Medicine   MD Venu Edge MD Shantel Branch-Fleming, MD Katya Georgiev PA-C Nam Ho, MD Pediatrics   CAS France, SANTOS Chilel APRN MD Bre Sharp MD Deborah Mielke, MD Kim Thein, APRN CNP      Clinic hours: Monday - Thursday 7 am-7 pm; Fridays 7 am-5 pm.   Urgent care: Monday - Friday 11 am-9 pm; Saturday and Sunday 9 am-5 pm.  Pharmacy : Monday -Thursday 8 am-8 pm; Friday 8 am-6 pm; Saturday and Sunday 9 am-5 pm.     Clinic: [phone]   Pharmacy: [phone]  PGen Hypertension Study   Visit 4     Thank you for your continued participation in the hypertension study!  Please continue taking your  hypertension medications as directed. Your next visit will be in one and a half months and will be scheduled for you in the coming days.     After it is scheduled, be sure to let the research coordinator know as soon as possible if you cannot make it so that the visit can be rescheduled for you.     Please contact the research coordinator if you receive care for your hypertension outside of your study visits.    If you have any questions, please contact the research coordinator at [phone]. If you experience any symptoms please call the [facility] 24/7 triage number at [phone]. If your phone number, email or address changes please alert the research coordinator.     In the meantime, we ask that you complete the online surveys emailed out to  you, if completing online, or mailed out to you, if completing paper surveys,  before your next visit.    Lifestyle changes that can help control high blood pressure:  Even though PGEN is a study to test effectiveness of genetically guided medications for managing high blood pressure, there are several things you can do to ensure your blood pressure stays in good control:    Maintain a healthy weight (BMI<26). A modest amount of weight loss can be helpful    Limit salt intake to under 2400mg daily    Follow the DASH diet (lean meats, low salt, whole grains, lots of fruits/vegies)    Stay active, try to get in 30 minutes of exercise daily.    Manage your daily stress.    Do not smoke cigarettes (or cut back)    Limit alcohol (2 drinks/day for men, 1 drink/day for women)

## 2018-04-24 DIAGNOSIS — G47.33 OSA (OBSTRUCTIVE SLEEP APNEA): Primary | Chronic | ICD-10-CM

## 2018-04-24 DIAGNOSIS — I10 HYPERTENSION GOAL BP (BLOOD PRESSURE) < 140/90: Chronic | ICD-10-CM

## 2018-04-27 ENCOUNTER — TRANSFERRED RECORDS (OUTPATIENT)
Dept: HEALTH INFORMATION MANAGEMENT | Facility: CLINIC | Age: 63
End: 2018-04-27

## 2018-04-27 LAB
CREAT SERPL-MCNC: 0.71 MG/DL (ref 0.57–1.11)
GFR SERPL CREATININE-BSD FRML MDRD: >60 ML/MIN/1.73M2
GLUCOSE SERPL-MCNC: 152 MG/DL (ref 65–100)
POTASSIUM SERPL-SCNC: 4.1 MMOL/L (ref 3.5–5)

## 2018-04-30 ENCOUNTER — TELEPHONE (OUTPATIENT)
Dept: LAB | Facility: CLINIC | Age: 63
End: 2018-04-30

## 2018-04-30 NOTE — TELEPHONE ENCOUNTER
"Hospital/TCU/ED for chronic condition Discharge Protocol    \"Hi, my name is CARIDAD WORRELL, a registered nurse, and I am calling from [facility].  I am calling to follow up and see how things are going for you after your recent emergency visit/hospital/TCU stay.\"    Tell me how you are doing now that you are home?\" Doing well, reports energy level returning to normal. Denies pain. Reports having a BM every day from Saturday through today 4/30/18.       Discharge Instructions    \"Let's review your discharge instructions.  What is/are the follow-up recommendations?  Pt. Response: Follow-up with PCP. Patient reports that she has returned to work ( in home) and is also having to assist her daughter this week so as of now she is unable to schedule an appointment with PCP. Due to patient feeling well she is wishing not to schedule at the moment. Reports she will call and schedule an appointment with provider once she has figured out her schedule.     \"Has an appointment with your primary care provider been scheduled?\"   No (schedule appointment)    \"When you see the provider, I would recommend that you bring your medications with you.\"    Medications    \"Tell me what changed about your medicines when you discharged?\"    Changes to chronic meds?    0-1    \"What questions do you have about your medications?\"    None     New diagnoses of heart failure, COPD, diabetes, or MI?    No              Medication reconciliation completed? No, due to patient denying at questions or concerns with medications. Report IV antibiotics were given when inpatient.   Was MTM referral placed (*Make sure to put transitions as reason for referral)?   No    Call Summary    \"What questions or concerns do you have about your recent visit and your follow-up care?\"     none    \"If you have questions or things don't continue to improve, we encourage you contact us through the main clinic number (give number).  Even if the clinic is not " "open, triage nurses are available 24/7 to help you.     We would like you to know that our clinic has extended hours (provide information).  We also have urgent care (provide details on closest location and hours/contact info)\"      \"Thank you for your time and take care!\"    Earline Wilson RN               "

## 2018-05-01 ENCOUNTER — TELEPHONE (OUTPATIENT)
Dept: CARE COORDINATION | Facility: CLINIC | Age: 63
End: 2018-05-01

## 2018-05-01 ENCOUNTER — PATIENT OUTREACH (OUTPATIENT)
Dept: CARE COORDINATION | Facility: CLINIC | Age: 63
End: 2018-05-01

## 2018-05-01 NOTE — LETTER
[facility] CARE COORDINATION  [place] [id number] [phone]    May 1, 2018    Marie Kaiser  [address] [place] [id number]      Dear Marie,    I am a clinic care coordinator who works with Rosalinda Muse MD at [facility]. I wanted to thank you for spending the time talking with me in your busy day.  I wanted to introduce myself and provide you with my contact information so that you can call me with questions or concerns about your health care. Below is a description of clinic care coordination and how I can further assist you.     The clinic care coordinator is a registered nurse and/or  who understand the health care system. The goal of clinic care coordination is to help you manage your health and improve access to the [facility] system in the most efficient manner. The registered nurse can assist you in meeting your health care goals by providing education, coordinating services, and strengthening the communication among your providers. The  can assist you with financial, behavioral, psychosocial, chemical dependency, counseling, and/or psychiatric resources.    Please feel free to contact me at [phone], with any questions or concerns. We at [facility] are focused on providing you with the highest-quality healthcare experience possible and that all starts with you.     Sincerely,     Eugenia Ely    Enclosed: I have enclosed a copy of a 24 Hour Access Plan. This has helpful phone numbers for you to call when needed. Please keep this in an easy to access place to use as needed.

## 2018-05-01 NOTE — PROGRESS NOTES
Clinic Care Coordination Contact    OUTREACH    Referral Information:  Referral Source: ED Follow-Up         Chief Complaint   Patient presents with     ER F/U     Nurse: ER f/u 4/27/18 Diverticulitis at [facility] Utilization:   Utilization    Last refreshed: 5/1/2018 10:43 AM:  No Show Count (past year) 3       Last refreshed: 5/1/2018 10:43 AM:  ED visits 0       Last refreshed: 5/1/2018 10:43 AM:  Hospital admissions 0          Current as of: 5/1/2018 10:43 AM             Clinical Concerns:  Current Medical Concerns:  RN CC spoke with patient, she states she is feel really well. She states she is sleeping well, eating well, bowel movements are going fine, denies having any pain. She states she is taking her medication as prescribed. 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 She denies having any questions or concerns regarding her discharge instructions.      Current Behavioral Concerns: No concerns at this time.       Education Provided to patient: RN CC educated about Care Coordination Services, discharge instructions, medications reviewed and follow up.      Clinical Pathway Name: None  Health Maintenance Reviewed:      Medication Management:  Patient is independent in medication management. Pt. verbalized adherence and understanding of medication regimen, side effects, purposes.     Functional Status:  Mobility Status: Independent  Equipment Currently Used at Home: none  Transportation:        Psychosocial:  Current living arrangement:: I live in a private home with family     Financial/Insurance: No concerns at this time.       Resources and Interventions:  Current Resources:  RN will send out a letter to pt's MyChart with RN CC contact information, explanation of CC services and patient care plan.         Advanced Care Plans/Directives on file:: No        Goals:   n/a      Patient/Caregiver  understanding: Patient verbalized understanding of her discharge instructions.      Future Appointments              In 1 week Rosalinda Muse MD [facility]    In 2 weeks Bebe Trimble APRN CNP [facility]           Plan:   No further Care Coordination needs identified at this time. Patient may be referred to Care Coordination in the future if additional needs arise.  Pt encouraged to contact Care Coordinator through the clinic if situation changes and assistance is needed. No follow-up planned.    Eugenia Ely RN, [facility]  RN Care Coordinator  [facility]  Phone # [phone]  5/1/2018 11:15 AM

## 2018-05-01 NOTE — LETTER
Health Care Home - Access Care Plan    About Me  Patient Name:  Marie Kaiser    YOB: 1955  Age:                             62 year old   [place] MRN:            [id number] Telephone Information:     Home Phone [phone]   Mobile [phone]       Address:    [address] [place] [address]  [place] [address] Email address:  [email or web address]      Emergency Contact(s)  Name Relationship Lgl Grd Work Phone Home Phone Mobile Phone   1. IRIS* Daughter   [phone]    2. DAVID* Daughter    [phone]             Health Maintenance: Routine Health maintenance Reviewed: Due/Overdue   Health Maintenance Due   Topic Date Due     HIV SCREEN (SYSTEM ASSIGNED)  10/08/1972     HEPATITIS C SCREENING  10/08/1972     ASTHMA ACTION PLAN Q1 YR  05/27/2016     ADVANCE DIRECTIVE PLANNING Q5 YRS  06/21/2016     MAMMO SCREEN Q2 YR (SYSTEM ASSIGNED)  05/29/2017     ASTHMA CONTROL TEST Q6 MOS  02/28/2018         My Access Plan  Medical Emergency 911   Questions or concerns during clinic hours Primary Clinic Line, I will call the clinic directly: [facility] - [phone]   24 Hour Appointment Line [phone] or  [phone]) (toll free)   24 Hour Nurse Line [phone] (toll free)   Questions or concerns outside clinic hours 24 Hour Appointment Line, I will call the after-hours on-call line:   [facility] [phone] or [phone]) (toll-free)   Preferred Urgent Care Other   Preferred Hospital Other ([facility])   Preferred Pharmacy [id number] [address] [place] [address]     Behavioral Health Crisis Line The National Suicide Prevention Lifeline at 1-403.430.7193 or 911     My Care Team Members  Patient Care Team       Relationship Specialty Notifications Start End    Rosalinda Muse MD PCP - General   8/29/07     Phone: [phone] Fax: [phone] [address] [facility] [address]            My Medical and Care Information  Problem List   Patient Active Problem List   Diagnosis     Intermittent asthma     Major depressive disorder, recurrent episode, in full remission (HCC)     Hypothyroidism     Impaired fasting glucose     Hyperlipidemia LDL goal <130     Hypertension goal BP (blood pressure) < 140/90     Advanced directives, counseling/discussion     Diverticulitis of colon     A-fib (H)     Obesity     DONA (obstructive sleep apnea)- mild (AHI 5)     Gastroesophageal reflux disease without esophagitis      Current Medications and Allergies:  See printed Medication Report

## 2018-05-01 NOTE — TELEPHONE ENCOUNTER
DC'd from [facility] on 4/27/18 to Home self care   Primary Problem: ED discharge  LACE: 53 Moderate

## 2018-05-07 ENCOUNTER — TELEPHONE (OUTPATIENT)
Dept: FAMILY MEDICINE | Facility: CLINIC | Age: 63
End: 2018-05-07

## 2018-05-07 NOTE — TELEPHONE ENCOUNTER
Patient has an E/R follow up visit scheduled for Monday May 14 @ 5:40 PM with Dr. Muse.    Patient thought it was scheduled for today Monday May 7th so that they can review her medications.    Patient states she will be out of one of her meds by next appointment date (Augmentin?).    If provider needs to see patient sooner please call patient to schedule or do phone visit.    Patient said she will keep the appointment on May 14 if no sooner appointment is available.

## 2018-05-08 ENCOUNTER — TELEPHONE (OUTPATIENT)
Dept: FAMILY MEDICINE | Facility: CLINIC | Age: 63
End: 2018-05-08

## 2018-05-08 NOTE — TELEPHONE ENCOUNTER
Patient reports that she finished the augmentin. She thought that she was having problems with her tendons in her feet that may be related to the the augmentin. Advised her to get in to get scheduled sooner then May 14th but she refused to be seen by any other provider than Dr. Muse and Dr. Muse did not have any appointment times that worked for her sooner than May 14th. She reports having loose stools that may be related to eating too many carrots. She reports having pain and pressure in abdomen when bending over from pants being to tight. Educated her to wear loose cotton sweat pants.   Vonda Velasquez RN

## 2018-05-08 NOTE — TELEPHONE ENCOUNTER
Please call patient re:  There are appointments available for Tues or Thurs. She is welcome to any of them.    If her symptoms are resolved she can stop the augmentin when course completed.  If ongoing symptoms, follow up this week.    If not symptoms now- follow up 5/14/18     PSK

## 2018-05-08 NOTE — TELEPHONE ENCOUNTER
Panel Management Review      BP Readings from Last 1 Encounters:   03/30/18 132/72    ,   Lab Results   Component Value Date    A1C 6.0 06/07/2013   , 5/7/2018  Last Office Visit with this department: 5/7/2018    Fail List measure: mammogram      Patient is due/failing the following:   MAMMOGRAM    Action needed:   call    Type of outreach:    Sent [employer] message.    Questions for provider review:    None                                                                                                                                    Aysha Branch      Chart routed to n/a .

## 2018-05-14 ENCOUNTER — OFFICE VISIT (OUTPATIENT)
Dept: FAMILY MEDICINE | Facility: CLINIC | Age: 63
End: 2018-05-14
Payer: COMMERCIAL

## 2018-05-14 ENCOUNTER — MYC MEDICAL ADVICE (OUTPATIENT)
Dept: FAMILY MEDICINE | Facility: CLINIC | Age: 63
End: 2018-05-14

## 2018-05-14 VITALS
WEIGHT: 235 LBS | TEMPERATURE: 98.4 F | OXYGEN SATURATION: 96 % | HEIGHT: 69 IN | SYSTOLIC BLOOD PRESSURE: 148 MMHG | BODY MASS INDEX: 34.8 KG/M2 | HEART RATE: 77 BPM | DIASTOLIC BLOOD PRESSURE: 94 MMHG
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
## 2018-05-14 DIAGNOSIS — E03.4 HYPOTHYROIDISM DUE TO ACQUIRED ATROPHY OF THYROID: Chronic | ICD-10-CM

## 2018-05-14 DIAGNOSIS — R09.81 NASAL CONGESTION: ICD-10-CM

## 2018-05-14 DIAGNOSIS — G89.29 CHRONIC PAIN OF BOTH SHOULDERS: ICD-10-CM

## 2018-05-14 DIAGNOSIS — M25.511 CHRONIC PAIN OF BOTH SHOULDERS: ICD-10-CM

## 2018-05-14 DIAGNOSIS — R16.0 ENLARGED LIVER: ICD-10-CM

## 2018-05-14 DIAGNOSIS — M25.512 CHRONIC PAIN OF BOTH SHOULDERS: ICD-10-CM

## 2018-05-14 DIAGNOSIS — J45.20 MILD INTERMITTENT ASTHMA WITHOUT COMPLICATION: Chronic | ICD-10-CM

## 2018-05-14 DIAGNOSIS — R73.9 HYPERGLYCEMIA: ICD-10-CM

## 2018-05-14 DIAGNOSIS — M54.9 UPPER BACK PAIN: Primary | ICD-10-CM

## 2018-05-14 DIAGNOSIS — K57.30 DIVERTICULOSIS OF LARGE INTESTINE WITHOUT HEMORRHAGE: Primary | ICD-10-CM

## 2018-05-14 PROCEDURE — 99214 OFFICE O/P EST MOD 30 MIN: CPT | Performed by: FAMILY MEDICINE

## 2018-05-14 RX ORDER — ASPIRIN 325 MG
81 TABLET ORAL
COMMUNITY
End: 2020-11-03

## 2018-05-14 RX ORDER — FEXOFENADINE HCL 180 MG/1
180 TABLET ORAL DAILY
Qty: 30 TABLET | Refills: 1 | Status: SHIPPED | OUTPATIENT
Start: 2018-05-14 | End: 2020-11-03

## 2018-05-14 RX ORDER — THYROID 60 MG/1
60 TABLET ORAL DAILY
Qty: 30 TABLET | Refills: 1 | Status: SHIPPED | OUTPATIENT
Start: 2018-05-14 | End: 2018-07-14

## 2018-05-14 ASSESSMENT — ANXIETY QUESTIONNAIRES
7. FEELING AFRAID AS IF SOMETHING AWFUL MIGHT HAPPEN: NOT AT ALL
6. BECOMING EASILY ANNOYED OR IRRITABLE: NOT AT ALL
2. NOT BEING ABLE TO STOP OR CONTROL WORRYING: NOT AT ALL
1. FEELING NERVOUS, ANXIOUS, OR ON EDGE: NOT AT ALL
3. WORRYING TOO MUCH ABOUT DIFFERENT THINGS: NOT AT ALL
GAD7 TOTAL SCORE: 0
5. BEING SO RESTLESS THAT IT IS HARD TO SIT STILL: NOT AT ALL

## 2018-05-14 ASSESSMENT — PATIENT HEALTH QUESTIONNAIRE - PHQ9: 5. POOR APPETITE OR OVEREATING: NOT AT ALL

## 2018-05-14 ASSESSMENT — PAIN SCALES - GENERAL: PAINLEVEL: NO PAIN (0)
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
## 2018-05-14 NOTE — MR AVS SNAPSHOT
After Visit Summary   5/14/2018    Marie Kaiser    MRN: [id number]           Patient Information     Date Of Birth          1955        Visit Information        Provider Department      5/14/2018 5:40 PM Rosalinda Muse MD [facility]        Today's Diagnoses     Diverticulosis of large intestine without hemorrhage    -  1    Nasal congestion        Hypothyroidism due to acquired atrophy of thyroid        Mild intermittent asthma without complication        Hyperglycemia        Enlarged liver          Care Instructions    No additional treatment for diverticulosis is recommended currently.  Continue to keep bowel movements regularly.    Increase the [place] Thyroid to 60 mg daily.  Follow up labs in 6-8 weeks.  We will do additional lab testing for blood sugar and liver function at that time.    Script for the allegra sent.  We will attempt prior authorization if needed.           Follow-ups after your visit        Your next 10 appointments already scheduled     May 18, 2018 10:20 AM CDT   Nurse Only with CHINA Alfredo CNP   [facility]    [address] [place] [address] [phone]              Future tests that were ordered for you today     Open Future Orders        Priority Expected Expires Ordered    **Comprehensive metabolic panel FUTURE anytime Routine 5/14/2018 5/14/2019 5/14/2018    TSH Routine 6/25/2018 5/14/2019 5/14/2018    T4 free Routine 6/25/2018 5/14/2019 5/14/2018    T3 total Routine 6/25/2018 5/14/2019 5/14/2018    **A1C FUTURE anytime Routine 6/25/2018 5/14/2019 5/14/2018            Who to contact     If you have questions or need follow up information about today's clinic visit or your schedule please contact [facility] directly at [phone].  Normal or non-critical lab and imaging results will be communicated to you by MyChart, letter or phone within  "4 business days after the clinic has received the results. If you do not hear from us within 7 days, please contact the clinic through [employer] or phone. If you have a critical or abnormal lab result, we will notify you by phone as soon as possible.  Submit refill requests through [employer] or call your pharmacy and they will forward the refill request to us. Please allow 3 business days for your refill to be completed.          Additional Information About Your Visit        [employer] Information     [employer] gives you secure access to your electronic health record. If you see a primary care provider, you can also send messages to your care team and make appointments. If you have questions, please call your primary care clinic.  If you do not have a primary care provider, please call [phone] and they will assist you.        Care EveryWhere ID     This is your Care EveryWhere ID. This could be used by other organizations to access your [facility] medical records  [id number]        Your Vitals Were     Pulse Temperature Height Pulse Oximetry BMI (Body Mass Index)       77 98.4  F (36.9  C) (Oral) 1.753 m (5' 9\") 96% 34.7 kg/m2        Blood Pressure from Last 3 Encounters:   05/14/18 (!) 148/94   03/30/18 132/72   03/02/18 136/78    Weight from Last 3 Encounters:   05/14/18 106.6 kg (235 lb)   03/30/18 107.5 kg (237 lb)   03/02/18 106.6 kg (235 lb)                 Today's Medication Changes          These changes are accurate as of 5/14/18  6:44 PM.  If you have any questions, ask your nurse or doctor.               These medicines have changed or have updated prescriptions.        Dose/Directions    * ARMOUR THYROID 30 MG tablet   This may have changed:  Another medication with the same name was added. Make sure you understand how and when to take each.   Used for:  Hypothyroidism due to acquired atrophy of thyroid   Generic drug:  thyroid   Changed by:  Rosalinda Muse MD        TAKE 1 TABLET BY MOUTH ONCE DAILY "   Quantity:  90 tablet   Refills:  3       * thyroid 60 MG tablet   Commonly known as:  ARMOUR THYROID   This may have changed:  You were already taking a medication with the same name, and this prescription was added. Make sure you understand how and when to take each.   Used for:  Hypothyroidism due to acquired atrophy of thyroid   Changed by:  Rosalinda Muse MD        Dose:  60 mg   Take 1 tablet (60 mg) by mouth daily   Quantity:  30 tablet   Refills:  1       * Notice:  This list has 2 medication(s) that are the same as other medications prescribed for you. Read the directions carefully, and ask your doctor or other care provider to review them with you.      Stop taking these medicines if you haven't already. Please contact your care team if you have questions.     loratadine 10 MG tablet   Commonly known as:  CLARITIN   Stopped by:  Rosalinda Muse MD                Where to get your medicines      These medications were sent to [facility] [address] [place] [facility] [place] [address] [address] [facility] [address]     Phone:  [phone]     fexofenadine 180 MG tablet    thyroid 60 MG tablet                Primary Care Provider Office Phone # Fax #    Rosalinda Muse [phone] [address] [facility] [address]  [facility] [address]        Equal Access to Services     [facility] AH: Hadii jeff stone hadasho Soomaali, waaxda luqadaha, qaybta kaalmada adeegyada, kandy childress haynilsa rutledge. So [facility] [phone].    ATENCIÓN: Si habla español, tiene a novoa disposición servicios gratuitos de asistencia lingüística. Llame al [phone].    We comply with applicable federal civil rights laws and [place] laws. We do not discriminate on the basis of race, color, national origin, age, disability, sex, sexual orientation, or gender identity.            Thank you!     Thank you for choosing [facility]  for your care. Our goal is always to provide you with excellent care.  Hearing back from our patients is one way we can continue to improve our services. Please take a few minutes to complete the written survey that you may receive in the mail after your visit with us. Thank you!             Your Updated Medication List - Protect others around you: Learn how to safely use, store and throw away your medicines at www.disposemymeds.org.          This list is accurate as of 5/14/18  6:44 PM.  Always use your most recent med list.                   Brand Name Dispense Instructions for use Diagnosis    * ARMOUR THYROID 30 MG tablet   Generic drug:  thyroid     90 tablet    TAKE 1 TABLET BY MOUTH ONCE DAILY    Hypothyroidism due to acquired atrophy of thyroid       * thyroid 60 MG tablet    ARMOUR THYROID    30 tablet    Take 1 tablet (60 mg) by mouth daily    Hypothyroidism due to acquired atrophy of thyroid       aspirin 325 MG tablet      Take 325 mg by mouth        EPINEPHrine 0.3 MG/0.3ML injection 2-pack    EPIPEN/ADRENACLICK/or ANY BX GENERIC EQUIV    0.6 mL    INJECT 0.3 MLS (0.3 MG) INTO THE MUSCLE ONCE AS NEEDED FOR ANAPHYLAXIS    Food allergy       felodipine ER 5 MG 24 hr tablet    PLENDIL    30 tablet    TAKE 1 TABLET BY MOUTH DAILY    Hypertension goal BP (blood pressure) < 140/90       fexofenadine 180 MG tablet    ALLEGRA    30 tablet    Take 1 tablet (180 mg) by mouth daily    Nasal congestion       order for DME      Equipment ordered: Respironics Auto PAP Mask type: Nasal Settings: 5-15 cm        * Notice:  This list has 2 medication(s) that are the same as other medications prescribed for you. Read the directions carefully, and ask your doctor or other care provider to review them with you.

## 2018-05-14 NOTE — LETTER
My Asthma Action Plan  Name: Marie Kaiser   YOB: 1955  Date: 5/20/2018   My doctor: Rosalinda Muse MD   My clinic: [facility]        My Control Medicine: None  My Rescue Medicine: Levalbuterol (Xopenex) inhaler 2 puffs every 4-6 hours as needed.     My Asthma Severity: intermittent  Avoid your asthma triggers: upper respiratory infections               GREEN ZONE   Good Control    I feel good    No cough or wheeze    Can work, sleep and play without asthma symptoms       Take your asthma control medicine every day.     1. If exercise triggers your asthma, take your rescue medication    15 minutes before exercise or sports, and    During exercise if you have asthma symptoms  2. Spacer to use with inhaler: If you have a spacer, make sure to use it with your inhaler             YELLOW ZONE Getting Worse  I have ANY of these:    I do not feel good    Cough or wheeze    Chest feels tight    Wake up at night   1. Keep taking your Green Zone medications  2. Start taking your rescue medicine:    every 20 minutes for up to 1 hour. Then every 4 hours for 24-48 hours.  3. If you stay in the Yellow Zone for more than 12-24 hours, contact your doctor.  4. If you do not return to the Green Zone in 12-24 hours or you get worse, start taking your oral steroid medicine if prescribed by your provider.           RED ZONE Medical Alert - Get Help  I have ANY of these:    I feel awful    Medicine is not helping    Breathing getting harder    Trouble walking or talking    Nose opens wide to breathe       1. Take your rescue medicine NOW  2. If your provider has prescribed an oral steroid medicine, start taking it NOW  3. Call your doctor NOW  4. If you are still in the Red Zone after 20 minutes and you have not reached your doctor:    Take your rescue medicine again and    Call 911 or go to the emergency room right away    See your regular doctor within 2 weeks of an Emergency Room or Urgent  Care visit for follow-up treatment.          Annual Reminders:  Meet with Asthma Educator,  Flu Shot in the Fall, consider Pneumonia Vaccination for patients with asthma (aged 19 and older).    Pharmacy: [facility] [address] [place] [address] [place]                      Asthma Triggers  How To Control Things That Make Your Asthma Worse    Triggers are things that make your asthma worse.  Look at the list below to help you find your triggers and what you can do about them.  You can help prevent asthma flare-ups by staying away from your triggers.      Trigger                                                          What you can do   Cigarette Smoke  Tobacco smoke can make asthma worse. Do not allow smoking in your home, car or around you.  Be sure no one smokes at a child s day care or school.  If you smoke, ask your health care provider for ways to help you quit.  Ask family members to quit too.  Ask your health care provider for a referral to Quit Plan to help you quit smoking, or call 5-768-190-PLAN.     Colds, Flu, Bronchitis  These are common triggers of asthma. Wash your hands often.  Don t touch your eyes, nose or mouth.  Get a flu shot every year.     Dust Mites  These are tiny bugs that live in cloth or carpet. They are too small to see. Wash sheets and blankets in hot water every week.   Encase pillows and mattress in dust mite proof covers.  Avoid having carpet if you can. If you have carpet, vacuum weekly.   Use a dust mask and HEPA vacuum.   Pollen and Outdoor Mold  Some people are allergic to trees, grass, or weed pollen, or molds. Try to keep your windows closed.  Limit time out doors when pollen count is high.   Ask you health care provider about taking medicine during allergy season.     Animal Dander  Some people are allergic to skin flakes, urine or saliva from pets with fur or feathers. Keep pets with fur or feathers out of your home.    If you can t keep the pet outdoors, then keep the pet  out of your bedroom.  Keep the bedroom door closed.  Keep pets off cloth furniture and away from stuffed toys.     Mice, Rats, and Cockroaches  Some people are allergic to the waste from these pests.   Cover food and garbage.  Clean up spills and food crumbs.  Store grease in the refrigerator.   Keep food out of the bedroom.   Indoor Mold  This can be a trigger if your home has high moisture. Fix leaking faucets, pipes, or other sources of water.   Clean moldy surfaces.  Dehumidify basement if it is damp and smelly.   Smoke, Strong Odors, and Sprays  These can reduce air quality. Stay away from strong odors and sprays, such as perfume, powder, hair spray, paints, smoke incense, paint, cleaning products, candles and new carpet.   Exercise or Sports  Some people with asthma have this trigger. Be active!  Ask your doctor about taking medicine before sports or exercise to prevent symptoms.    Warm up for 5-10 minutes before and after sports or exercise.     Other Triggers of Asthma  Cold air:  Cover your nose and mouth with a scarf.  Sometimes laughing or crying can be a trigger.  Some medicines and food can trigger asthma.

## 2018-05-14 NOTE — PATIENT INSTRUCTIONS
No additional treatment for diverticulosis is recommended currently.  Continue to keep bowel movements regularly.    Increase the [place] Thyroid to 60 mg daily.  Follow up labs in 6-8 weeks.  We will do additional lab testing for blood sugar and liver function at that time.    Script for the allegra sent.  We will attempt prior authorization if needed.

## 2018-05-15 VITALS
WEIGHT: 235 LBS | SYSTOLIC BLOOD PRESSURE: 148 MMHG | OXYGEN SATURATION: 96 % | BODY MASS INDEX: 34.8 KG/M2 | HEART RATE: 77 BPM | DIASTOLIC BLOOD PRESSURE: 94 MMHG | HEIGHT: 69 IN | TEMPERATURE: 98.4 F

## 2018-05-15 ASSESSMENT — PAIN SCALES - GENERAL: PAINLEVEL: NO PAIN (0)

## 2018-05-15 ASSESSMENT — ASTHMA QUESTIONNAIRES: ACT_TOTALSCORE: 23

## 2018-05-15 ASSESSMENT — PATIENT HEALTH QUESTIONNAIRE - PHQ9: SUM OF ALL RESPONSES TO PHQ QUESTIONS 1-9: 6

## 2018-05-15 ASSESSMENT — ANXIETY QUESTIONNAIRES: GAD7 TOTAL SCORE: 0
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
## 2018-05-20 NOTE — PATIENT INSTRUCTIONS
Referral to chiropractor.  Continue home exercises.   Follow up in 3 months for recheck or sooner if worsening or failure to improve.

## 2018-05-24 ENCOUNTER — ALLIED HEALTH/NURSE VISIT (OUTPATIENT)
Dept: FAMILY MEDICINE | Facility: CLINIC | Age: 63
End: 2018-05-24

## 2018-05-24 VITALS
DIASTOLIC BLOOD PRESSURE: 76 MMHG | OXYGEN SATURATION: 96 % | WEIGHT: 232 LBS | HEIGHT: 69 IN | BODY MASS INDEX: 34.36 KG/M2 | TEMPERATURE: 98.5 F | SYSTOLIC BLOOD PRESSURE: 126 MMHG | HEART RATE: 81 BPM

## 2018-05-24 DIAGNOSIS — I10 ESSENTIAL HYPERTENSION WITH GOAL BLOOD PRESSURE LESS THAN 140/90: Primary | ICD-10-CM

## 2018-05-24 PROCEDURE — 99214 OFFICE O/P EST MOD 30 MIN: CPT | Performed by: FAMILY MEDICINE

## 2018-05-24 ASSESSMENT — PAIN SCALES - GENERAL: PAINLEVEL: NO PAIN (0)

## 2018-05-24 NOTE — MR AVS SNAPSHOT
After Visit Summary   5/24/2018    Marie Kaiser    MRN: [id number]           Patient Information     Date Of Birth          1955        Visit Information        Provider Department      5/24/2018 6:00 PM Edwin Chang MD [facility]        Today's Diagnoses     Essential hypertension with goal blood pressure less than 140/90    -  1      Care Instructions    At [facility], we strive to deliver an exceptional experience to you, every time we see you.  If you receive a survey in the mail, please send us back your thoughts. We really do value your feedback.    Based on your medical history, these are the current health maintenance/preventive care services that you are due for (some may have been done at this visit.)  Health Maintenance Due   Topic Date Due     HIV SCREEN (SYSTEM ASSIGNED)  10/08/1972     HEPATITIS C SCREENING  10/08/1972     ADVANCE DIRECTIVE PLANNING Q5 YRS  06/21/2016     MAMMO SCREEN Q2 YR (SYSTEM ASSIGNED)  05/29/2017     PAP Q3 YR  11/16/2018       Suggested websites for health information:  Www.ehealthtracker.Zafu : Up to date and easily searchable information on multiple topics.  Www.medlineplus.gov : medication info, interactive tutorials, watch real surgeries online  Www.familydoctor.org : good info from the Academy of Family Physicians  Www.cdc.gov : public health info, travel advisories, epidemics (H1N1)  Www.aap.org : children's health info, normal development, vaccinations  Www.health.state[place] : [place] dept of health, public health issues in [place], N1N1    Your care team:                            Family Medicine Internal Medicine   MD Venu Edge MD Shantel Branch-Fleming, MD Katya Georgiev PA-C Megan Hill, APRLEX Armenta MD Pediatrics   Cj Horan, PAANGELITA Phan, CNP MD Racheal Starr APRN CNP   MD Bre Ramos MD Deborah Mielke, MD Kim Thein, APRN  CNP      Clinic hours: Monday - Thursday 7 am-7 pm; Fridays 7 am-5 pm.   Urgent care: Monday - Friday 11 am-9 pm; Saturday and Sunday 9 am-5 pm.  Pharmacy : Monday -Thursday 8 am-8 pm; Friday 8 am-6 pm; Saturday and Sunday 9 am-5 pm.     Clinic: [phone]   Pharmacy: [phone]    PGen Hypertension Study  Visit 5    Continue felodipine 5 mg daily. Expect to follow up in 6 weeks.    Thank you for your continued participation in the hypertension study!  Please continue taking your hypertension medications as directed. Your next visit will be in one and a half months and will be scheduled for you in the coming days. After it is scheduled, be sure to let the research coordinator know as soon as possible if you cannot make it so that the visit can be rescheduled for you.     Please contact the research coordinator if you receive care for your hypertension outside of your study visits.    If you have any questions, please contact the research coordinator at [phone]. If you experience any symptoms please call the [place] 24/7 triage number at [phone]. If your phone number, email or address changes please alert the research coordinator.     In the meantime, we ask that you complete the online surveys emailed out to you, if completing online, or mailed out to you, if completing paper surveys, before your next visit.           Follow-ups after your visit        Follow-up notes from your care team     Return in about 6 weeks (around 7/5/2018) for PGen.      Who to contact     If you have questions or need follow up information about today's clinic visit or your schedule please contact [facility] [place] directly at [phone].  Normal or non-critical lab and imaging results will be communicated to you by MyChart, letter or phone within 4 business days after the clinic has received the results. If you do not hear from us within 7 days, please contact the clinic through MyChart or phone. If  "you have a critical or abnormal lab result, we will notify you by phone as soon as possible.  Submit refill requests through [employer] or call your pharmacy and they will forward the refill request to us. Please allow 3 business days for your refill to be completed.          Additional Information About Your Visit        "Mobile Location, IP"hart Information     [employer] gives you secure access to your electronic health record. If you see a primary care provider, you can also send messages to your care team and make appointments. If you have questions, please call your primary care clinic.  If you do not have a primary care provider, please call [phone] and they will assist you.        Care EveryWhere ID     This is your Care EveryWhere ID. This could be used by other organizations to access your [place] medical records  [id number]        Your Vitals Were     Pulse Temperature Height Pulse Oximetry BMI (Body Mass Index)       81 98.5  F (36.9  C) (Oral) 1.753 m (5' 9\") 96% 34.26 kg/m2        Blood Pressure from Last 3 Encounters:   05/24/18 126/76   05/15/18 (!) 148/94   05/14/18 (!) 148/94    Weight from Last 3 Encounters:   05/24/18 105.2 kg (232 lb)   05/15/18 106.6 kg (235 lb)   05/14/18 106.6 kg (235 lb)              Today, you had the following     No orders found for display       Primary Care Provider Office Phone # Fax #    Rosalinda Muse [phone] [address]  [place] [facility] [address]        Equal Access to Services     [facility] Joseph[facility]ITA AH: Hadii aad ku hadasho Soomaali, waaxda luqadaha, qaybta kaalmada adeegyada, kandy rutledge. So [facility] [phone].    ATENCIÓN: Si habla español, tiene a novoa disposición servicios gratuitos de asistencia lingüística. Llame al [phone].    We comply with applicable federal civil rights laws and [place] laws. We do not discriminate on the basis of race, color, national origin, age, disability, sex, sexual orientation, or gender " identity.            Thank you!     Thank you for choosing [facility]  for your care. Our goal is always to provide you with excellent care. Hearing back from our patients is one way we can continue to improve our services. Please take a few minutes to complete the written survey that you may receive in the mail after your visit with us. Thank you!             Your Updated Medication List - Protect others around you: Learn how to safely use, store and throw away your medicines at www.disposemymeds.org.          This list is accurate as of 5/24/18  6:30 PM.  Always use your most recent med list.                   Brand Name Dispense Instructions for use Diagnosis    * ARMOUR THYROID 30 MG tablet   Generic drug:  thyroid     90 tablet    TAKE 1 TABLET BY MOUTH ONCE DAILY    Hypothyroidism due to acquired atrophy of thyroid       * thyroid 60 MG tablet    ARMOUR THYROID    30 tablet    Take 1 tablet (60 mg) by mouth daily    Hypothyroidism due to acquired atrophy of thyroid       aspirin 325 MG tablet      Take 325 mg by mouth        EPINEPHrine 0.3 MG/0.3ML injection 2-pack    EPIPEN/ADRENACLICK/or ANY BX GENERIC EQUIV    0.6 mL    INJECT 0.3 MLS (0.3 MG) INTO THE MUSCLE ONCE AS NEEDED FOR ANAPHYLAXIS    Food allergy       felodipine ER 5 MG 24 hr tablet    PLENDIL    30 tablet    TAKE 1 TABLET BY MOUTH DAILY    Hypertension goal BP (blood pressure) < 140/90       fexofenadine 180 MG tablet    ALLEGRA    30 tablet    Take 1 tablet (180 mg) by mouth daily    Nasal congestion       order for DME      Equipment ordered: Respironics Auto PAP Mask type: Nasal Settings: 5-15 cm        * Notice:  This list has 2 medication(s) that are the same as other medications prescribed for you. Read the directions carefully, and ask your doctor or other care provider to review them with you.

## 2018-05-24 NOTE — PATIENT INSTRUCTIONS
At [facility], we strive to deliver an exceptional experience to you, every time we see you.  If you receive a survey in the mail, please send us back your thoughts. We really do value your feedback.    Based on your medical history, these are the current health maintenance/preventive care services that you are due for (some may have been done at this visit.)  Health Maintenance Due   Topic Date Due     HIV SCREEN (SYSTEM ASSIGNED)  10/08/1972     HEPATITIS C SCREENING  10/08/1972     ADVANCE DIRECTIVE PLANNING Q5 YRS  06/21/2016     MAMMO SCREEN Q2 YR (SYSTEM ASSIGNED)  05/29/2017     PAP Q3 YR  11/16/2018       Suggested websites for health information:  Www.[facility].org : Up to date and easily searchable information on multiple topics.  Www.medlineplus.gov : medication info, interactive tutorials, watch real surgeries online  Www.familydoctor.org : good info from the Academy of Family Physicians  Www.cdc.gov : public health info, travel advisories, epidemics (H1N1)  Www.aap.org : children's health info, normal development, vaccinations  Www.health.[facility].[place].us : [place] dept of health, public health issues in [place], N1N1    Your care team:                            Family Medicine Internal Medicine   MD Venu Edge MD Shantel Branch-Fleming, MD Katya Georgiev PA-C Megan Hill, APRLEX Armenta MD Pediatrics   Cj Horan, PAANGELITA Phan, MD Racheal Austin APRN CNP   MD Bre Ramos MD Deborah Mielke, MD Kim Thein, APRN [facility]      Clinic hours: Monday - Thursday 7 am-7 pm; Fridays 7 am-5 pm.   Urgent care: Monday - Friday 11 am-9 pm; Saturday and Sunday 9 am-5 pm.  Pharmacy : Monday -Thursday 8 am-8 pm; Friday 8 am-6 pm; Saturday and Sunday 9 am-5 pm.     Clinic: [phone]   Pharmacy: [phone]    PGen Hypertension Study  Visit 5    Continue felodipine 5 mg daily. Expect to follow up in 6 weeks.    Thank you for your  continued participation in the hypertension study!  Please continue taking your hypertension medications as directed. Your next visit will be in one and a half months and will be scheduled for you in the coming days. After it is scheduled, be sure to let the research coordinator know as soon as possible if you cannot make it so that the visit can be rescheduled for you.     Please contact the research coordinator if you receive care for your hypertension outside of your study visits.    If you have any questions, please contact the research coordinator at [phone]. If you experience any symptoms please call the [place] 24/7 triage number at [phone]. If your phone number, email or address changes please alert the research coordinator.     In the meantime, we ask that you complete the online surveys emailed out to you, if completing online, or mailed out to you, if completing paper surveys, before your next visit.

## 2018-05-24 NOTE — PROGRESS NOTES
"PGEN study visit  UNCONTROLLED HYPERTENSION ARM visit 5    SUBJECTIVE:  Marie is here for 5th PGEN research study visit. Please refer to research tab in the header and today's flow sheet for further details. Patient had uncontrolled hypertension at enrollment and has a goal of <  140/90 (per Problem list target chosen by PCP)     Prior research note reviewed. Patient is on blood pressure medication(s) at this time. She has been taking felodipine 5 mg daily and is tolerating the medication well, except for her concern about side effects (see \"other pertinent history,\" below). She checks her blood pressures, and she wonders why it is so elevated after she is exercising - she will come back from a walk and checks right away, and systolic can be in the 190s. Her readings do decrease when she checks again at 5 minute intervals. She has also noticed that even small amounts of salt and sugar can affect her blood pressure readings.    Current diet & lifestyle: she walks every day, and is intending to start biking again. She limits salt (no added salt), diet consists of lots of fruits and vegetables, drinks a lot of water, and limits grains, flour, sugar. She eats all natural products. She has stopped drinking cow dairy, but instead drinks coconut milk. Limit to 1 cup of coffee. She was successful in losing a lot of weight about 5 years ago by doing a calorie-restrictive diet and using a \"hormone\" she took sublingually. However, this triggered bulimia so she stopped this regimen.  Smoking: no, and no second hand exposure  Alcohol consumption: none  Other pertinent history: she had diverticulitis at the end of April and a CT scan revealed an enlarged liver, and she was concerned that this was a side effect of the felodipine. Additionally, her thyroid medication was recently increased and she notes this has improved her energy level greatly.      BP Readings from Last 3 Encounters:   05/24/18 126/76   05/15/18 (!) 148/94 " "  05/14/18 (!) 148/94       Current Outpatient Prescriptions   Medication Sig Dispense Refill     ARMOUR THYROID 30 MG tablet TAKE 1 TABLET BY MOUTH ONCE DAILY 90 tablet 3     aspirin 325 MG tablet Take 325 mg by mouth       EPINEPHrine (EPIPEN/ADRENACLICK/OR ANY BX GENERIC EQUIV) 0.3 MG/0.3ML injection 2-pack INJECT 0.3 MLS (0.3 MG) INTO THE MUSCLE ONCE AS NEEDED FOR ANAPHYLAXIS 0.6 mL 0     felodipine ER (PLENDIL) 5 MG 24 hr tablet TAKE 1 TABLET BY MOUTH DAILY 30 tablet 3     fexofenadine (ALLEGRA) 180 MG tablet Take 1 tablet (180 mg) by mouth daily 30 tablet 1     order for DME Equipment ordered: Respironics Auto PAP Mask type: Nasal Settings: 5-15 cm       thyroid (ARMOUR THYROID) 60 MG tablet Take 1 tablet (60 mg) by mouth daily 30 tablet 1     Potassium   Date Value Ref Range Status   04/27/2018 4.1 3.5 - 5.0 mmol/L Final     Creatinine   Date Value Ref Range Status   04/27/2018 0.71 0.57 - 1.11 mg/dL Final     Urea Nitrogen   Date Value Ref Range Status   12/14/2017 22 7 - 30 mg/dL Final     GFR Estimate   Date Value Ref Range Status   04/27/2018 >60 >60 ml/min/1.73m2 Final      A baseline potassium, creatinine, BUN, GFR has been done within past 12 months    This document serves as a record of the services and decisions personally performed and made by Dr. Chang. It was created on his behalf by Ana Emery, a trained medical scribe. The creation of this document is based the provider's statements to the medical scribe.  Ana Emery May 24, 2018 6:03 PM     OBJECTIVE:  Patient in in no apparent distress and able to provide full history for today's encounter. she denies pain or any current illness   Vitals: /76 (BP Location: Left arm, Cuff Size: Adult Large)  Pulse 81  Temp 98.5  F (36.9  C) (Oral)  Ht 1.753 m (5' 9\")  Wt 105.2 kg (232 lb)  SpO2 96%  BMI 34.26 kg/m2  Today's BP completed using cuff size: large on left side  arm.    Pulse Readings from Last 1 Encounters:   05/24/18 81 "     Is pulse 55 or greater? - Yes    BMI= Body mass index is 34.26 kg/(m^2).    GENERAL: healthy, alert and no distress  EYES: Eyes grossly normal to inspection, PERRL, EOMI, sclerae white and conjunctivae normal  RESP: lungs clear to auscultation - no crackles or wheezes, no areas of dullness, no tachypnea  CV: Heart regular rate and rhythm without murmur, click or rub. No peripheral edema and peripheral pulses strong  MS: no gross musculoskeletal defects noted, no edema  SKIN: no suspicious lesions or rashes  NEURO: Normal strength and tone, sensory exam grossly normal, mentation intact, oriented times 3 and cranial nerves 2-12 intact  PSYCH: mentation appears normal, affect normal/bright        ASSESSMENT/PLAN:   [facility] (this saves flowsheet data)    Blood pressure reading today is at the provider specified goal of <140/90.      1.  Based on PGEN RN HTN MGMT standing order the patient will be advised continue current medication regimen unchanged.     2.  We will not be checking a metabolic lab panel today.      3.  Follow up instructions include:     Next visit in 6 weeks.  will contact to schedule.     See avs for more details.  Full research packet also provide to patient previously  Our research team will reach out to patient to schedule follow up visit as well.   Patient was counseled regarding the lifestyle changes (listed below)  to help with BP management Yes (bolded items are either discussed, or she does them already)  Lifestyle changes that can help control high blood pressure:  Even though PGEN is a study to test effectiveness of genetically guided medications for managing high blood pressure, there are several things you can do to ensure your blood pressure stays in good control:    Maintain a healthy weight (BMI<26). A modest amount of weight loss can be helpful    Limit salt intake to under 2400mg daily    Follow the DASH diet (lean meats,  low salt, whole grains, lots of fruits/vegies)    Stay active, try to get in 30 minutes of exercise daily.    Manage your daily stress.    Do not smoke cigarettes (or cut back)    Limit alcohol (2 drinks/day for men, 1 drink/day for women)  Was AVS  provided to patient with the relevant <dot>PGENPI dot phrase pulled into patient instructions Yes    The information in this document, created by the medical scribe for me, accurately reflects the services I personally performed and the decisions made by me. I have reviewed and approved this document for accuracy prior to leaving the patient care area. May 24, 2018 6:02 PM    Edwin Chang MD

## 2018-07-05 ENCOUNTER — MYC MEDICAL ADVICE (OUTPATIENT)
Dept: FAMILY MEDICINE | Facility: CLINIC | Age: 63
End: 2018-07-05

## 2018-07-14 DIAGNOSIS — E03.4 HYPOTHYROIDISM DUE TO ACQUIRED ATROPHY OF THYROID: Chronic | ICD-10-CM

## 2018-07-17 RX ORDER — THYROID 60 MG
TABLET ORAL
Qty: 30 TABLET | Refills: 1 | Status: SHIPPED | OUTPATIENT
Start: 2018-07-17 | End: 2018-09-10

## 2018-07-17 NOTE — TELEPHONE ENCOUNTER
Routing refill request to provider for review/approval because:  Allergy contraindication  Brandi Crenshaw RN

## 2018-07-30 ENCOUNTER — OFFICE VISIT (OUTPATIENT)
Dept: FAMILY MEDICINE | Facility: CLINIC | Age: 63
End: 2018-07-30
Payer: COMMERCIAL

## 2018-07-30 VITALS
WEIGHT: 226.8 LBS | DIASTOLIC BLOOD PRESSURE: 82 MMHG | OXYGEN SATURATION: 98 % | HEIGHT: 69 IN | HEART RATE: 75 BPM | SYSTOLIC BLOOD PRESSURE: 134 MMHG | BODY MASS INDEX: 33.59 KG/M2 | TEMPERATURE: 98.4 F

## 2018-07-30 DIAGNOSIS — I10 ESSENTIAL HYPERTENSION WITH GOAL BLOOD PRESSURE LESS THAN 140/90: Primary | ICD-10-CM

## 2018-07-30 PROCEDURE — 99214 OFFICE O/P EST MOD 30 MIN: CPT | Performed by: NURSE PRACTITIONER

## 2018-07-30 NOTE — PATIENT INSTRUCTIONS
At [facility], we strive to deliver an exceptional experience to you, every time we see you.  If you receive a survey in the mail, please send us back your thoughts. We really do value your feedback.    Based on your medical history, these are the current health maintenance/preventive care services that you are due for (some may have been done at this visit.)  Health Maintenance Due   Topic Date Due     HIV SCREEN (SYSTEM ASSIGNED)  10/08/1972     HEPATITIS C SCREENING  10/08/1972     ADVANCE DIRECTIVE PLANNING Q5 YRS  06/21/2016     MAMMO SCREEN Q2 YR (SYSTEM ASSIGNED)  05/29/2017     PAP Q3 YR  11/16/2018       Suggested websites for health information:  Www.[employer].org : Up to date and easily searchable information on multiple topics.  Www.medlineplus.gov : medication info, interactive tutorials, watch real surgeries online  Www.familydoctor.org : good info from the Academy of Family Physicians  Www.cdc.gov : public health info, travel advisories, epidemics (H1N1)  Www.aap.org : children's health info, normal development, vaccinations  Www.health.[facility].[place].us : [place] dept of health, public health issues in [place], N1N1    Your care team:                            Family Medicine Internal Medicine   MD Venu Edge MD Shantel Branch-Fleming, MD Katya Georgiev PA-C Megan Hill, APRLEX [place] MD Pediatrics   Cj Horan, PAANGELITA Phan, MD Racheal Austin APRN CNP   MD Bre Ramos MD Deborah Mielke, MD Kim Thein, APRN [facility]      Clinic hours: Monday - Thursday 7 am-7 pm; Fridays 7 am-5 pm.   Urgent care: Monday - Friday 11 am-9 pm; Saturday and Sunday 9 am-5 pm.  Pharmacy : Monday -Thursday 8 am-8 pm; Friday 8 am-6 pm; Saturday and Sunday 9 am-5 pm.     Clinic: [phone]   Pharmacy: [phone]      [facility]  Visit 6     Thank you for your continued participation in the hypertension study!  Please  continue taking your hypertension medications as directed. Your next visit will be in three months and will be scheduled for you in the coming days. After it is scheduled, be sure to let the research coordinator know as soon as possible if you cannot make it so that the visit can be rescheduled for you.     Please contact the research coordinator if you receive care for your hypertension outside of your study visits.    If you have any questions, please contact the research coordinator at [phone]. If you experience any symptoms please call the [employer] 24/7 triage number at [phone]. If your phone number, email or address changes please alert the research coordinator.     In the meantime, we ask that you complete the online surveys emailed out to  you, if completing online, or mailed out to you, if completing paper surveys,  before your next visit.    Lifestyle changes that can help control high blood pressure:  Even though PGEN is a study to test effectiveness of genetically guided medications for managing high blood pressure, there are several things you can do to ensure your blood pressure stays in good control:    Maintain a healthy weight (BMI<26). A modest amount of weight loss can be helpful    Limit salt intake to under 2400mg daily    Follow the DASH diet (lean meats, low salt, whole grains, lots of fruits/vegies)    Stay active, try to get in 30 minutes of exercise daily.    Manage your daily stress.    Do not smoke cigarettes (or cut back)    Limit alcohol (2 drinks/day for men, 1 drink/day for women)

## 2018-07-30 NOTE — MR AVS SNAPSHOT
After Visit Summary   7/30/2018    Marie Kaiser    MRN: [id number]           Patient Information     Date Of Birth          1955        Visit Information        Provider Department      7/30/2018 6:40 PM Bebe Trimble, [place] [facility]        Today's Diagnoses     Essential hypertension with goal blood pressure less than 140/90    -  1      Care Instructions    At [facility], we strive to deliver an exceptional experience to you, every time we see you.  If you receive a survey in the mail, please send us back your thoughts. We really do value your feedback.    Based on your medical history, these are the current health maintenance/preventive care services that you are due for (some may have been done at this visit.)  Health Maintenance Due   Topic Date Due     HIV SCREEN (SYSTEM ASSIGNED)  10/08/1972     HEPATITIS C SCREENING  10/08/1972     ADVANCE DIRECTIVE PLANNING Q5 YRS  06/21/2016     MAMMO SCREEN Q2 YR (SYSTEM ASSIGNED)  05/29/2017     PAP Q3 YR  11/16/2018       Suggested websites for health information:  Www.[employer] : Up to date and easily searchable information on multiple topics.  Www.medlineplus.gov : medication info, interactive tutorials, watch real surgeries online  Www.familydoctor.org : good info from the Academy of Family Physicians  Www.cdc.gov : public health info, travel advisories, epidemics (H1N1)  Www.aap.org : children's health info, normal development, vaccinations  Www.health.[facility].[place].us : [place] dept of health, public health issues in [place], N1N1    Your care team:                            Family Medicine Internal Medicine   MD Venu Edge MD Shantel Branch-Fleming, MD Katya Georgiev PA-C Megan Hill, APRLEX Armenta MD Pediatrics   Cj Horan PAANGELITA Phan, MD Racheal Austin APRN CNP   MD Bre Ramos MD Deborah Mielke, MD Kim Thein,  APRN CNP      Clinic hours: Monday - Thursday 7 am-7 pm; Fridays 7 am-5 pm.   Urgent care: Monday - Friday 11 am-9 pm; Saturday and Sunday 9 am-5 pm.  Pharmacy : Monday -Thursday 8 am-8 pm; Friday 8 am-6 pm; Saturday and Sunday 9 am-5 pm.     Clinic: [phone]   Pharmacy: [phone]      [facility] Hypertension Study  Visit 6     Thank you for your continued participation in the hypertension study!  Please continue taking your hypertension medications as directed. Your next visit will be in three months and will be scheduled for you in the coming days. After it is scheduled, be sure to let the research coordinator know as soon as possible if you cannot make it so that the visit can be rescheduled for you.     Please contact the research coordinator if you receive care for your hypertension outside of your study visits.    If you have any questions, please contact the research coordinator at [phone]. If you experience any symptoms please call the [employer] 24/7 triage number at [phone]. If your phone number, email or address changes please alert the research coordinator.     In the meantime, we ask that you complete the online surveys emailed out to  you, if completing online, or mailed out to you, if completing paper surveys,  before your next visit.    Lifestyle changes that can help control high blood pressure:  Even though [facility] is a study to test effectiveness of genetically guided medications for managing high blood pressure, there are several things you can do to ensure your blood pressure stays in good control:    Maintain a healthy weight (BMI<26). A modest amount of weight loss can be helpful    Limit salt intake to under 2400mg daily    Follow the DASH diet (lean meats, low salt, whole grains, lots of fruits/vegies)    Stay active, try to get in 30 minutes of exercise daily.    Manage your daily stress.    Do not smoke cigarettes (or cut back)    Limit alcohol (2 drinks/day for men, 1  "drink/day for women)            Follow-ups after your visit        Who to contact     If you have questions or need follow up information about today's clinic visit or your schedule please contact [facility] directly at [phone].  Normal or non-critical lab and imaging results will be communicated to you by [employer], letter or phone within 4 business days after the clinic has received the results. If you do not hear from us within 7 days, please contact the clinic through [employer] or phone. If you have a critical or abnormal lab result, we will notify you by phone as soon as possible.  Submit refill requests through [employer] or call your pharmacy and they will forward the refill request to us. Please allow 3 business days for your refill to be completed.          Additional Information About Your Visit        [employer] Information     [employer] gives you secure access to your electronic health record. If you see a primary care provider, you can also send messages to your care team and make appointments. If you have questions, please call your primary care clinic.  If you do not have a primary care provider, please call [phone] and they will assist you.        Care EveryWhere ID     This is your Care EveryWhere ID. This could be used by other organizations to access your [facility] medical records  [id number]        Your Vitals Were     Pulse Temperature Height Pulse Oximetry BMI (Body Mass Index)       75 98.4  F (36.9  C) (Oral) 5' 9\" (1.753 m) 98% 33.49 kg/m2        Blood Pressure from Last 3 Encounters:   07/30/18 134/82   05/24/18 126/76   05/15/18 (!) 148/94    Weight from Last 3 Encounters:   07/30/18 226 lb 12.8 oz (102.9 kg)   05/24/18 232 lb (105.2 kg)   05/15/18 235 lb (106.6 kg)              Today, you had the following     No orders found for display       Primary Care Provider Office Phone # Fax #    Rosalidna Muse [phone] [address] [facility] [address] [place] [id number]        Equal Access to Services     [facility]: Hadii jeff stone hadrosaliosujata Sophiaali, waaxda luqadaha, qaybta kaalmada pavithra, kandy rutledge. So [facility] [phone].    ATENCIÓN: Si habla español, tiene a novoa disposición servicios gratuitos de asistencia lingüística. Conchis al [phone].    We comply with applicable federal civil rights laws and [place] laws. We do not discriminate on the basis of race, color, national origin, age, disability, sex, sexual orientation, or gender identity.            Thank you!     Thank you for choosing [facility]  for your care. Our goal is always to provide you with excellent care. Hearing back from our patients is one way we can continue to improve our services. Please take a few minutes to complete the written survey that you may receive in the mail after your visit with us. Thank you!             Your Updated Medication List - Protect others around you: Learn how to safely use, store and throw away your medicines at www.disposemymeds.org.          This list is accurate as of 7/30/18  7:16 PM.  Always use your most recent med list.                   Brand Name Dispense Instructions for use Diagnosis    ARMOUR THYROID 60 MG tablet   Generic drug:  thyroid     30 tablet    TAKE 1 TABLET (60 MG) BY MOUTH DAILY    Hypothyroidism due to acquired atrophy of thyroid       aspirin 325 MG tablet      Take 325 mg by mouth        EPINEPHrine 0.3 MG/0.3ML injection 2-pack    EPIPEN/ADRENACLICK/or ANY BX GENERIC EQUIV    0.6 mL    INJECT 0.3 MLS (0.3 MG) INTO THE MUSCLE ONCE AS NEEDED FOR ANAPHYLAXIS    Food allergy       felodipine ER 5 MG 24 hr tablet    PLENDIL    30 tablet    TAKE 1 TABLET BY MOUTH DAILY    Hypertension goal BP (blood pressure) < 140/90       fexofenadine 180 MG tablet    ALLEGRA    30 tablet    Take 1 tablet (180 mg) by mouth daily    Nasal congestion       order for DME      Equipment ordered: Respironics  Auto PAP Mask type: Nasal Settings: 5-15 cm

## 2018-07-30 NOTE — PROGRESS NOTES
PGEN study visit  UNCONTROLLED HYPERTENSION ARM visit 6    SUBJECTIVE:  Marie is here for 6th PGEN research study visit. Please refer to research tab in the header and today's flow sheet for further details. Patient had uncontrolled  hypertension at enrollment and has a goal of <  140/90 (per Problem list target chosen by PCP)     Prior research note reviewed. Patient is on blood pressure medication(s) at this time.  she has been taking Felodipine 5 mg daily and is tolerating the medication well.  She has occasional dizziness after taking it but symptoms are short lived and not bothersome.  She has not had any syncopal episodes, no cp, shortness of breath, palpitations.    Current diet & lifestyle: Low salt, eating more fruits and vegetables, whole grains, has given up all wheat, no white sugar, white rice.  She is no longer drinking cows milk- consumes coconut milk, drinks only 1 cup of coffee/day. She walks 3-4 times/day, is biking regularly  Smoking: no, no second hand smoking  Alcohol consumption NONE  Other pertinent history       BP Readings from Last 3 Encounters:   07/30/18 163/86   05/24/18 126/76   05/15/18 (!) 148/94       Current Outpatient Prescriptions   Medication Sig Dispense Refill     ARMOUR THYROID 60 MG tablet TAKE 1 TABLET (60 MG) BY MOUTH DAILY 30 tablet 1     aspirin 325 MG tablet Take 325 mg by mouth       EPINEPHrine (EPIPEN/ADRENACLICK/OR ANY BX GENERIC EQUIV) 0.3 MG/0.3ML injection 2-pack INJECT 0.3 MLS (0.3 MG) INTO THE MUSCLE ONCE AS NEEDED FOR ANAPHYLAXIS 0.6 mL 0     felodipine ER (PLENDIL) 5 MG 24 hr tablet TAKE 1 TABLET BY MOUTH DAILY 30 tablet 3     fexofenadine (ALLEGRA) 180 MG tablet Take 1 tablet (180 mg) by mouth daily 30 tablet 1     order for DME Equipment ordered: Respironics Auto PAP Mask type: Nasal Settings: 5-15 cm       Potassium   Date Value Ref Range Status   04/27/2018 4.1 3.5 - 5.0 mmol/L Final     Creatinine   Date Value Ref Range Status   04/27/2018 0.71 0.57 -  "1.11 mg/dL Final     Urea Nitrogen   Date Value Ref Range Status   12/14/2017 22 7 - 30 mg/dL Final     GFR Estimate   Date Value Ref Range Status   04/27/2018 >60 >60 ml/min/1.73m2 Final      A baseline potassium, creatinine, BUN, GFR has been done within past 12 months      OBJECTIVE:  Patient in in no apparent distress and able to provide full history for today's encounter. she denies pain or any current illness   Vitals: /86 (BP Location: Left arm, Patient Position: Chair, Cuff Size: Adult Large)  Pulse 75  Temp 98.4  F (36.9  C) (Oral)  Ht 5' 9\" (1.753 m)  Wt 226 lb 12.8 oz (102.9 kg)  SpO2 98%  BMI 33.49 kg/m2  Today's BP completed using cuff size: large on left side  arm.    Pulse Readings from Last 1 Encounters:   07/30/18 75     Is pulse 55 or greater? - Yes    BMI= Body mass index is 33.49 kg/(m^2).  Other exam findings   GENERAL: healthy, alert and no distress  EYES: Eyes grossly normal to inspection, PERRL, EOMI, sclerae white and conjunctivae normal  RESP: lungs clear to auscultation - no crackles or wheezes, no areas of dullness, no tachypnea  CV: Heart regular rate and rhythm without murmur, click or rub. No peripheral edema and peripheral pulses strong  MS: no gross musculoskeletal defects noted, no edema  SKIN: no suspicious lesions or rashes  NEURO: Normal strength and tone, sensory exam grossly normal, mentation intact, oriented times 3 and cranial nerves 2-12 intact  PSYCH: mentation appears normal, affect normal/bright     ASSESSMENT/PLAN:   PGEN Flowsheet has been  'filed' ) for this visit (this saves flowsheet data)    Blood pressure reading today is at the provider specified goal of <140/90.      1.  Based on PGEN RN HTN MGMT standing order the patient will be advised continue current medication regimen unchanged.     2.  We will not be checking a metabolic lab panel today.      3.  Follow up instructions include:     Next Nurse visit: 12 weeks.    See avs for more details.  Full " research packet also provide to patient previously  Our research team will reach out to patient to schedule follow up visit as well.   Patient was counseled regarding the lifestyle changes (listed below)  to help with BP management Yes  Lifestyle changes that can help control high blood pressure:  Even though PGEN is a study to test effectiveness of genetically guided medications for managing high blood pressure, there are several things you can do to ensure your blood pressure stays in good control:    Maintain a healthy weight (BMI<26). A modest amount of weight loss can be helpful    Limit salt intake to under 2400mg daily    Follow the DASH diet (lean meats, low salt, whole grains, lots of fruits/vegies)    Stay active, try to get in 30 minutes of exercise daily.    Manage your daily stress.    Do not smoke cigarettes (or cut back)    Limit alcohol (2 drinks/day for men, 1 drink/day for women)  Was AVS  provided to patient with the relevant <dot>PGENPI dot phrase pulled into patient instructions Yes    Bebe ATKINSON, CNP

## 2018-09-04 ENCOUNTER — TELEPHONE (OUTPATIENT)
Dept: FAMILY MEDICINE | Facility: CLINIC | Age: 63
End: 2018-09-04

## 2018-09-10 DIAGNOSIS — E03.4 HYPOTHYROIDISM DUE TO ACQUIRED ATROPHY OF THYROID: Chronic | ICD-10-CM

## 2018-09-10 NOTE — TELEPHONE ENCOUNTER
"Requested Prescriptions   Pending Prescriptions Disp Refills     ARMOUR THYROID 60 MG tablet [Pharmacy Med Name: ARMOUR THYROID 60 MG TABLET] 30 tablet 1     Sig: TAKE 1 TABLET (60 MG) BY MOUTH DAILY    Thyroid Protocol Passed    9/10/2018 10:51 AM       Passed - Patient is 12 years or older       Passed - Recent (12 mo) or future (30 days) visit within the authorizing provider's specialty    Patient had office visit in the last 12 months or has a visit in the next 30 days with authorizing provider or within the authorizing provider's specialty.  See \"Patient Info\" tab in inbasket, or \"Choose Columns\" in Meds & Orders section of the refill encounter.           Passed - Normal TSH on file in past 12 months    Recent Labs   Lab Test  01/02/18   1105   TSH  2.18             Passed - No active pregnancy on record    If patient is pregnant or has had a positive pregnancy test, please check TSH.         Passed - No positive pregnancy test in past 12 months    If patient is pregnant or has had a positive pregnancy test, please check TSH.        ARMOUR THYROID 60 MG tablet  Last Written Prescription Date:  7/17/18  Last Fill Quantity: 30,  # refills: 1   Last office visit: 7/30/2018 with prescribing provider:  Dr. Muse   Future Office Visit:      "

## 2018-09-11 ENCOUNTER — OFFICE VISIT (OUTPATIENT)
Dept: FAMILY MEDICINE | Facility: CLINIC | Age: 63
End: 2018-09-11
Payer: COMMERCIAL

## 2018-09-11 VITALS
HEART RATE: 83 BPM | SYSTOLIC BLOOD PRESSURE: 136 MMHG | OXYGEN SATURATION: 97 % | TEMPERATURE: 98.4 F | DIASTOLIC BLOOD PRESSURE: 74 MMHG | HEIGHT: 69 IN | WEIGHT: 231.1 LBS | BODY MASS INDEX: 34.23 KG/M2 | RESPIRATION RATE: 18 BRPM

## 2018-09-11 DIAGNOSIS — I10 HYPERTENSION GOAL BP (BLOOD PRESSURE) < 140/90: Chronic | ICD-10-CM

## 2018-09-11 DIAGNOSIS — I86.8 SPIDER VARICOSE VEIN: Primary | ICD-10-CM

## 2018-09-11 DIAGNOSIS — Z12.4 SCREENING FOR MALIGNANT NEOPLASM OF CERVIX: ICD-10-CM

## 2018-09-11 DIAGNOSIS — Z12.31 VISIT FOR SCREENING MAMMOGRAM: ICD-10-CM

## 2018-09-11 DIAGNOSIS — Z11.4 SCREENING FOR HIV (HUMAN IMMUNODEFICIENCY VIRUS): ICD-10-CM

## 2018-09-11 DIAGNOSIS — Z11.59 NEED FOR HEPATITIS C SCREENING TEST: ICD-10-CM

## 2018-09-11 PROCEDURE — 99213 OFFICE O/P EST LOW 20 MIN: CPT | Performed by: PHYSICIAN ASSISTANT

## 2018-09-11 ASSESSMENT — PAIN SCALES - GENERAL: PAINLEVEL: NO PAIN (0)

## 2018-09-11 NOTE — TELEPHONE ENCOUNTER
"Requested Prescriptions   Pending Prescriptions Disp Refills     felodipine ER (PLENDIL) 5 MG 24 hr tablet [Pharmacy Med Name: FELODIPINE ER 5 MG TABLET]  Last Written Prescription Date:  5/9/18  Last Fill Quantity: 30 tablet,  # refills: 3   Last office visit: 7/30/2018 with prescribing provider:  Dr. Muse   Future Office Visit:   30 tablet 3     Sig: TAKE 1 TABLET BY MOUTH EVERY DAY    Calcium Channel Blockers Protocol  Passed    9/11/2018 11:26 AM       Passed - Blood pressure under 140/90 in past 12 months    BP Readings from Last 3 Encounters:   07/30/18 134/82   05/24/18 126/76   05/15/18 (!) 148/94                Passed - Recent (12 mo) or future (30 days) visit within the authorizing provider's specialty    Patient had office visit in the last 12 months or has a visit in the next 30 days with authorizing provider or within the authorizing provider's specialty.  See \"Patient Info\" tab in inbasket, or \"Choose Columns\" in Meds & Orders section of the refill encounter.           Passed - Patient is age 18 or older       Passed - No active pregnancy on record       Passed - Normal serum creatinine on file in past 12 months    Recent Labs   Lab Test 04/27/18   CR  0.71            Passed - No positive pregnancy test in past 12 months          "

## 2018-09-11 NOTE — MR AVS SNAPSHOT
After Visit Summary   9/11/2018    Marie Kaiser    MRN: [id number]           Patient Information     Date Of Birth          1955        Visit Information        Provider Department      9/11/2018 6:00 PM Iris Bang PA-C [facility]        Today's Diagnoses     Spider varicose vein    -  1    Visit for screening mammogram        Screening for malignant neoplasm of cervix        Need for hepatitis C screening test        Screening for HIV (human immunodeficiency virus)           Follow-ups after your visit        Who to contact     If you have questions or need follow up information about today's clinic visit or your schedule please contact [facility] directly at [phone].  Normal or non-critical lab and imaging results will be communicated to you by MyChart, letter or phone within 4 business days after the clinic has received the results. If you do not hear from us within 7 days, please contact the clinic through [employer] or phone. If you have a critical or abnormal lab result, we will notify you by phone as soon as possible.  Submit refill requests through [employer] or call your pharmacy and they will forward the refill request to us. Please allow 3 business days for your refill to be completed.          Additional Information About Your Visit        MyChart Information     [employer] gives you secure access to your electronic health record. If you see a primary care provider, you can also send messages to your care team and make appointments. If you have questions, please call your primary care clinic.  If you do not have a primary care provider, please call [phone] and they will assist you.        Care EveryWhere ID     This is your Care EveryWhere ID. This could be used by other organizations to access your [facility] medical records  [id number]        Your Vitals Were     Pulse Temperature Respirations Height Pulse Oximetry BMI  "(Body Mass Index)    83 98.4  F (36.9  C) (Oral) 18 5' 9\" (1.753 m) 97% 34.13 kg/m2       Blood Pressure from Last 3 Encounters:   09/11/18 136/74   07/30/18 134/82   05/24/18 126/76    Weight from Last 3 Encounters:   09/11/18 231 lb 1.6 oz (104.8 kg)   07/30/18 226 lb 12.8 oz (102.9 kg)   05/24/18 232 lb (105.2 kg)              Today, you had the following     No orders found for display         Today's Medication Changes          These changes are accurate as of 9/11/18 11:59 PM.  If you have any questions, ask your nurse or doctor.               These medicines have changed or have updated prescriptions.        Dose/Directions    felodipine ER 5 MG 24 hr tablet   Commonly known as:  PLENDIL   This may have changed:  See the new instructions.   Used for:  Hypertension goal BP (blood pressure) < 140/90   Changed by:  Rosalinda Muse MD        TAKE 1 TABLET BY MOUTH EVERY DAY   Quantity:  30 tablet   Refills:  3            Where to get your medicines      These medications were sent to [facility] [address] [place] [facility] [address] [address] [address] [facility] [address]     Phone:  [phone]     felodipine ER 5 MG 24 hr tablet                Primary Care Provider Office Phone # Fax #    Rosalinda Muse [phone] [address] [facility] [address]        Equal Access to Services     [facility]: Hadii jeff stone hadasho Soomaali, waaxda luqadaha, qaybta kaalmada adeegyadusty, waxay fior linares . So [facility] [phone].    ATENCIÓN: Si habla español, tiene a novoa disposición servicios gratuitos de asistencia lingüística. Llame al [phone].    We comply with applicable federal civil rights laws and [place] laws. We do not discriminate on the basis of race, color, national origin, age, disability, sex, sexual orientation, or gender identity.            Thank you!     Thank you for choosing [facility]  for your care. Our goal is always to " provide you with excellent care. Hearing back from our patients is one way we can continue to improve our services. Please take a few minutes to complete the written survey that you may receive in the mail after your visit with us. Thank you!             Your Updated Medication List - Protect others around you: Learn how to safely use, store and throw away your medicines at www.disposemymeds.org.          This list is accurate as of 9/11/18 11:59 PM.  Always use your most recent med list.                   Brand Name Dispense Instructions for use Diagnosis    aspirin 325 MG tablet      Take 325 mg by mouth        EPINEPHrine 0.3 MG/0.3ML injection 2-pack    EPIPEN/ADRENACLICK/or ANY BX GENERIC EQUIV    0.6 mL    INJECT 0.3 MLS (0.3 MG) INTO THE MUSCLE ONCE AS NEEDED FOR ANAPHYLAXIS    Food allergy       felodipine ER 5 MG 24 hr tablet    PLENDIL    30 tablet    TAKE 1 TABLET BY MOUTH EVERY DAY    Hypertension goal BP (blood pressure) < 140/90       fexofenadine 180 MG tablet    ALLEGRA    30 tablet    Take 1 tablet (180 mg) by mouth daily    Nasal congestion       order for DME      Equipment ordered: Respironics Auto PAP Mask type: Nasal Settings: 5-15 cm        thyroid 60 MG tablet    ARMOUR THYROID    30 tablet    Take 1 tablet (60 mg) by mouth daily +++ please return to clinic for labs prior to next refill +++    Hypothyroidism due to acquired atrophy of thyroid

## 2018-09-11 NOTE — PROGRESS NOTES
SUBJECTIVE:   Marie Kaiser is a 62 year old female who presents to clinic today for the following health issues:    Rash  Onset: today     Description:   Location: left shin  Character: round, raised, purple  Itching (Pruritis): YES    Progression of Symptoms:  same    Accompanying Signs & Symptoms:  Fever: no   Body aches or joint pain: YES  Sore throat symptoms: no   Recent cold symptoms: no     History:   Previous similar rash: no     Precipitating factors:   Exposure to similar rash: no   New exposures: None   Recent travel: no     Therapies Tried and outcome: None     Patient thinks that its a deer tick that burrowed in or cancerous mole.     Problem list and histories reviewed & adjusted, as indicated.  Additional history: as documented    Patient Active Problem List   Diagnosis     Intermittent asthma     Major depressive disorder, recurrent episode, in full remission (HCC)     Hypothyroidism     Impaired fasting glucose     Hyperlipidemia LDL goal <130     Essential hypertension with goal blood pressure less than 140/90     Advanced directives, counseling/discussion     History of diverticulitis of colon     A-fib (H)     Obesity, Class I, BMI 30-34.9     DONA (obstructive sleep apnea)- mild (AHI 5)     Gastroesophageal reflux disease without esophagitis     Past Surgical History:   Procedure Laterality Date     CARPAL TUNNEL RELEASE RT/LT      bilaterally     COLONOSCOPY  8/8/2011    Procedure:COLONOSCOPY; Surgeon:KEISHA DAVIS; Location:[facility] OR     HC DILATION/CURETTAGE DIAG/THER NON OB      Dr. Sanchez       Social History   Substance Use Topics     Smoking status: Never Smoker     Smokeless tobacco: Never Used     Alcohol use No     Family History   Problem Relation Age of Onset     Asthma Father      GASTROINTESTINAL DISEASE Father      hiatal hernia/acid reflux     HEART DISEASE Brother      Afib     Asthma Brother      Respiratory Brother      sleep apnea     Diabetes Brother       "Hypertension Brother      Depression Brother      Respiratory Sister      sleep apnea     Hypertension Sister      Thyroid Disease Sister      Obesity Sister      Depression Sister      Diabetes Sister      Alzheimer Disease Mother      advanced dementia     Allergies Daughter      cats     Diabetes Other      neice and nephew     C.A.D. No family hx of      Breast Cancer No family hx of      Cancer - colorectal No family hx of      Prostate Cancer No family hx of      Cancer No family hx of          Current Outpatient Prescriptions   Medication Sig Dispense Refill     aspirin 325 MG tablet Take 325 mg by mouth       EPINEPHrine (EPIPEN/ADRENACLICK/OR ANY BX GENERIC EQUIV) 0.3 MG/0.3ML injection 2-pack INJECT 0.3 MLS (0.3 MG) INTO THE MUSCLE ONCE AS NEEDED FOR ANAPHYLAXIS 0.6 mL 0     fexofenadine (ALLEGRA) 180 MG tablet Take 1 tablet (180 mg) by mouth daily 30 tablet 1     order for DME Equipment ordered: Respironics Auto PAP Mask type: Nasal Settings: 5-15 cm       felodipine ER (PLENDIL) 5 MG 24 hr tablet TAKE 1 TABLET BY MOUTH EVERY DAY 30 tablet 3     thyroid (ARMOUR THYROID) 60 MG tablet Take 1 tablet (60 mg) by mouth daily +++ please return to clinic for labs prior to next refill +++ 30 tablet 0     Allergies   Allergen Reactions     Cozaar Swelling     Cymbalta      Elevated blood pressure     Lexapro      Anxiety, tremors.     Zoloft      Joint pain, kidney pain     Ciprofloxacin      \"tendons on feet pull and can't walk\"     Erythromycin      Other reaction(s): Vomiting     Flonase [Fluticasone Propionate]      Nose Bleeds     Latex Swelling     Difficult breathing, tissue swelling     Latex      Levaquin Other (See Comments)     Muscle cramps     Lisinopril Cough     Metoprolol      Nausea, dry mouth, shortness of breath, difficulty urinating, swelling of hands     Metronidazole Nausea and Vomiting     Morphine      Made her \"shut down\"     Prozac [Fluoxetine Hcl]      suicidal     Spironolactone      " "Kidney problems     Synthroid [Levothyroxine Sodium]      Heart raced     Ceftin Itching       Reviewed and updated as needed this visit by clinical staff  Tobacco  Allergies  Meds  Med Hx  Surg Hx  Fam Hx  Soc Hx      Reviewed and updated as needed this visit by Provider         ROS:  Constitutional, HEENT, cardiovascular, pulmonary, GI, , musculoskeletal, neuro, skin, endocrine and psych systems are negative, except as otherwise noted.    OBJECTIVE:     /74 (BP Location: Right arm, Patient Position: Sitting, Cuff Size: Adult Large)  Pulse 83  Temp 98.4  F (36.9  C) (Oral)  Resp 18  Ht 5' 9\" (1.753 m)  Wt 231 lb 1.6 oz (104.8 kg)  SpO2 97%  BMI 34.13 kg/m2  Body mass index is 34.13 kg/(m^2).  GENERAL: healthy, alert and no distress  SKIN: bilateral lower legs moderate to severe varicose veins and spider veins, left shin single 5mm in diameter purple papule with surrounding spider veins. Non tender. Fluctuant fluid inside. Papule was puncture and blood contents were releases, the area became flat and skin changed color to normal.     Diagnostic Test Results:  none     ASSESSMENT/PLAN:         ICD-10-CM    1. Spider varicose vein I86.8    2. Visit for screening mammogram Z12.31 MA SCREENING DIGITAL BILAT - Future  (s+30)   3. Screening for malignant neoplasm of cervix Z12.4 Pap imaged thin layer screen with HPV - recommended age 30 - 65 years (select HPV order below)     HPV High Risk Types DNA Cervical   4. Need for hepatitis C screening test Z11.59    5. Screening for HIV (human immunodeficiency virus) Z11.4      1. Area was disinfected with alcohol and betadine, the papule was punctured with 23 madalyn needle, blood was released and area became flat and normal in color.bacitracin with band aid were applied.   Patient was reassured that its not tick or cancerous.     Patient will see primary care provider for health maintenance     Iris Bang PA-C  [facility]"

## 2018-09-12 RX ORDER — THYROID 60 MG/1
60 TABLET ORAL DAILY
Qty: 30 TABLET | Refills: 0 | Status: SHIPPED | OUTPATIENT
Start: 2018-09-12 | End: 2018-10-09

## 2018-09-12 NOTE — TELEPHONE ENCOUNTER
Routing refill request to provider for review/approval because:  Per provider plan and results note from 1/2/18 labs: Your thyroid tests are now in the normal range. Please continue your current thyroid dose and plan for your next labs for this in six months. Labs have not been done yet. Last refilled 7/17/18, #30 with 1 refill: Rosalinda Muse MD 8:24 PM   Note    Repeat labs recommended in September.  PSK        No future appointment at this time, though patient was seen in clinic yesterday by Rocío Bang. Labs not done.    Lola Lamar RN  [facility] Triage

## 2018-09-12 NOTE — TELEPHONE ENCOUNTER
Routing refill request to provider for review/approval because:  At last OV that addressed HTN in 7/30/18 was HTN uncontrolled, provider please advise on this refill request. Routing to PCP but Bebe Trimble last saw patient for issue in clinic FYI.    Earline Wilson, RN

## 2018-09-13 RX ORDER — FELODIPINE 5 MG/1
TABLET, EXTENDED RELEASE ORAL
Qty: 30 TABLET | Refills: 3 | Status: SHIPPED | OUTPATIENT
Start: 2018-09-13 | End: 2018-11-06

## 2018-09-13 NOTE — TELEPHONE ENCOUNTER
Patient is in the PGen study - was instructed at that visit to continue current BP medication - refill will be sent.  BP okay at last visit  BP Readings from Last 3 Encounters:   09/11/18 136/74   07/30/18 134/82   05/24/18 126/76      TOMÁS

## 2018-10-18 ENCOUNTER — MYC MEDICAL ADVICE (OUTPATIENT)
Dept: FAMILY MEDICINE | Facility: CLINIC | Age: 63
End: 2018-10-18

## 2018-11-06 ENCOUNTER — OFFICE VISIT (OUTPATIENT)
Dept: FAMILY MEDICINE | Facility: CLINIC | Age: 63
End: 2018-11-06
Payer: COMMERCIAL

## 2018-11-06 VITALS
OXYGEN SATURATION: 96 % | SYSTOLIC BLOOD PRESSURE: 150 MMHG | DIASTOLIC BLOOD PRESSURE: 84 MMHG | BODY MASS INDEX: 33.92 KG/M2 | HEART RATE: 85 BPM | TEMPERATURE: 98.4 F | HEIGHT: 69 IN | WEIGHT: 229 LBS

## 2018-11-06 DIAGNOSIS — I10 HYPERTENSION GOAL BP (BLOOD PRESSURE) < 140/90: Chronic | ICD-10-CM

## 2018-11-06 PROCEDURE — 99214 OFFICE O/P EST MOD 30 MIN: CPT | Performed by: FAMILY MEDICINE

## 2018-11-06 RX ORDER — FELODIPINE 5 MG/1
5 TABLET, EXTENDED RELEASE ORAL DAILY
Qty: 30 TABLET | Refills: 4 | Status: SHIPPED | OUTPATIENT
Start: 2018-11-06 | End: 2019-07-29

## 2018-11-06 ASSESSMENT — PAIN SCALES - GENERAL: PAINLEVEL: NO PAIN (0)

## 2018-11-06 NOTE — PATIENT INSTRUCTIONS
[facility] Hypertension Study  Visit 7     Thank you for your continued participation in the hypertension study!  Please continue taking your hypertension medications as directed. Your next visit will be next week and will be scheduled for you in the coming days. After it is scheduled, be sure to let the research coordinator know as soon as possible if you cannot make it so that the visit can be rescheduled for you.     Please contact the research coordinator if you receive care for your hypertension outside of your study visits.    If you have any questions, please contact the research coordinator at [phone]. If you experience any symptoms please call the [place] 24/7 triage number at [phone]. If your phone number, email or address changes please alert the research coordinator.     In the meantime, we ask that you complete the online surveys emailed out to you, if completing online, or mailed out to you, if completing paper surveys, before your next visit.    Lifestyle changes that can help control high blood pressure:  Even though [facility] is a study to test effectiveness of genetically guided medications for managing high blood pressure, there are several things you can do to ensure your blood pressure stays in good control:    Maintain a healthy weight (BMI<26). A modest amount of weight loss can be helpful    Limit salt intake to under 2400mg daily    Follow the DASH diet (lean meats, low salt, whole grains, lots of fruits/vegies)    Stay active, try to get in 30 minutes of exercise daily.    Manage your daily stress.    At [facility], we strive to deliver an exceptional experience to you, every time we see you.  If you receive a survey in the mail, please send us back your thoughts. We really do value your feedback.    Your care team:                            Family Medicine Internal Medicine   MD Venu Edge MD Shantel Branch-Fleming, MD Katya Georgiev  CAS Trejo, APRN [facility]    Maurizio Armenta MD Pediatrics   Cj Horan, CAS Phan, MD Racheal Austin APRN CNP   MD Bre Ramos MD Deborah Mielke, MD Kim Thein, APRN [facility]      Clinic hours: Monday - Thursday 7 am-7 pm; Fridays 7 am-5 pm.   Urgent care: Monday - Friday 11 am-9 pm; Saturday and Sunday 9 am-5 pm.  Pharmacy : Monday -Thursday 8 am-8 pm; Friday 8 am-6 pm; Saturday and Sunday 9 am-5 pm.     Clinic: [phone]   Pharmacy: [phone]

## 2018-11-06 NOTE — PROGRESS NOTES
PGEN study visit  UNCONTROLLED HYPERTENSION ARM visit 7    SUBJECTIVE:  Marie is here for 7th PGEN research study visit. Please refer to research tab in the header and today's flow sheet for further details. Patient had uncontrolled  hypertension at enrollment and has a goal of < 140/90 (per Problem list target chosen by PCP)     Prior research note reviewed. Patient is on blood pressure medication(s) at this time.  she has been taking felodipine and is tolerating the medication well. She does have some light headedness every once in a while, but only when she hasn't eaten. She notes that she rushed here today. Took her medication this morning. When she takes her BP at home it is usually in the range of 138/70. Her blood pressure was within goal when she last say Dr. Muse. She took her allergy medicine today, and thinks this may have increased her blood pressure today. She doesn't usually take this, but her allergies have been very bad with the mold in the air lately, she had to take it today. She only takes this as needed.     Current diet & lifestyle: Low carb diet- avoid caffeine and salt, no wheat or dairy, less meat, as described in previous visit note. For exercise she usually goes for walks as the weather allows, otherwise not a lot of exercise.   Smoking: nonsmoker  Alcohol consumption: none  Other pertinent history: as documented     This document serves as a record of the services and decisions personally performed and made by Edwin Chang MD. It was created on his behalf by Lizz Salazar, a trained medical scribe. The creation of this document is based the provider's statements to the medical scribe.  Lizz Salazar, November 6, 2018  1:53 PM       BP Readings from Last 3 Encounters:   11/06/18 146/80   09/11/18 136/74   07/30/18 134/82       Current Outpatient Prescriptions   Medication Sig Dispense Refill     INEZ THYROID 60 MG tablet TAKE 1 TABLET BY MOUTH EVERY DAY 90 tablet 0     aspirin 325 MG  "tablet Take 325 mg by mouth       EPINEPHrine (EPIPEN/ADRENACLICK/OR ANY BX GENERIC EQUIV) 0.3 MG/0.3ML injection 2-pack INJECT 0.3 MLS (0.3 MG) INTO THE MUSCLE ONCE AS NEEDED FOR ANAPHYLAXIS 0.6 mL 0     felodipine ER (PLENDIL) 5 MG 24 hr tablet TAKE 1 TABLET BY MOUTH EVERY DAY 30 tablet 3     fexofenadine (ALLEGRA) 180 MG tablet Take 1 tablet (180 mg) by mouth daily 30 tablet 1     order for DME Equipment ordered: Respironics Auto PAP Mask type: Nasal Settings: 5-15 cm       Potassium   Date Value Ref Range Status   04/27/2018 4.1 3.5 - 5.0 mmol/L Final     Creatinine   Date Value Ref Range Status   04/27/2018 0.71 0.57 - 1.11 mg/dL Final     Urea Nitrogen   Date Value Ref Range Status   12/14/2017 22 7 - 30 mg/dL Final     GFR Estimate   Date Value Ref Range Status   04/27/2018 >60 >60 ml/min/1.73m2 Final      A baseline potassium, creatinine, BUN, GFR has been done within past 12 months       OBJECTIVE:  Patient in in no apparent distress and able to provide full history for today's encounter. she denies pain or any current illness   Vitals: /80 (BP Location: Left arm, Patient Position: Chair, Cuff Size: Adult Large)  Pulse 85  Temp 98.4  F (36.9  C) (Oral)  Ht 1.753 m (5' 9\")  Wt 103.9 kg (229 lb)  SpO2 96%  BMI 33.82 kg/m2  Today's BP completed using cuff size: large on left side arm.    Pulse Readings from Last 1 Encounters:   11/06/18 85     Is pulse 55 or greater? - Yes    BMI= Body mass index is 33.82 kg/(m^2).  Other exam findings   GENERAL: healthy, alert and no distress  EYES: Eyes grossly normal to inspection, PERRL, EOMI, sclerae white and conjunctivae normal  RESP: lungs clear to auscultation - no crackles or wheezes, no areas of dullness, no tachypnea  CV: Heart regular rate and rhythm without murmur, click or rub. No peripheral edema and peripheral pulses strong  MS: no gross musculoskeletal defects noted, no edema  SKIN: no suspicious lesions or rashes  NEURO: Normal strength and tone, " sensory exam grossly normal, mentation intact, oriented times 3 and cranial nerves 2-12 intact  PSYCH: mentation appears normal, affect normal/bright       ASSESSMENT/PLAN:   PGEN Flowsheet has been  'filed' for this visit (this saves flowsheet data)    Blood pressure reading today is not at the provider specified goal of <140/90.      1.  Based on PGEN RN HTN MGMT standing order the patient will be advised continue current medication regimen unchanged.     2.  We will not be checking a metabolic lab panel today.  She will have a CMP drawn in the near future.     3.  Follow up instructions include:     Next Nurse visit: 1 week  Next Provider visit: Follow up in 4 months, assuming the dose has not been changed.     See avs for more details.  Full research packet also provide to patient previously  Our research team will reach out to patient to schedule follow up visit as well.   Patient was counseled regarding the lifestyle changes (listed below)  to help with BP management Yes  Lifestyle changes that can help control high blood pressure:  Even though PGEN is a study to test effectiveness of genetically guided medications for managing high blood pressure, there are several things you can do to ensure your blood pressure stays in good control:    Maintain a healthy weight (BMI<26). A modest amount of weight loss can be helpful    Limit salt intake to under 2400mg daily    Follow the DASH diet (lean meats, low salt, whole grains, lots of fruits/vegies)    Stay active, try to get in 30 minutes of exercise daily.    Manage your daily stress.    Do not smoke cigarettes (or cut back)    Limit alcohol (2 drinks/day for men, 1 drink/day for women)  Was AVS  provided to patient with the relevant <dot>PGENPI dot phrase pulled into patient instructions Yes    Edwin Chang MD

## 2018-11-06 NOTE — MR AVS SNAPSHOT
After Visit Summary   11/6/2018    Marie Kaiser    MRN: [id number]           Patient Information     Date Of Birth          1955        Visit Information        Provider Department      11/6/2018 1:20 PM Edwin Chang MD [facility]        Today's Diagnoses     Hypertension goal BP (blood pressure) < 140/90          Care Instructions    [facility] Hypertension Study  Visit 7     Thank you for your continued participation in the hypertension study!  Please continue taking your hypertension medications as directed. Your next visit will be next week and will be scheduled for you in the coming days. After it is scheduled, be sure to let the research coordinator know as soon as possible if you cannot make it so that the visit can be rescheduled for you.     Please contact the research coordinator if you receive care for your hypertension outside of your study visits.    If you have any questions, please contact the research coordinator at [phone]. If you experience any symptoms please call the [place] 24/7 triage number at [phone]. If your phone number, email or address changes please alert the research coordinator.     In the meantime, we ask that you complete the online surveys emailed out to you, if completing online, or mailed out to you, if completing paper surveys, before your next visit.    Lifestyle changes that can help control high blood pressure:  Even though [facility] to test effectiveness of genetically guided medications for managing high blood pressure, there are several things you can do to ensure your blood pressure stays in good control:    Maintain a healthy weight (BMI<26). A modest amount of weight loss can be helpful    Limit salt intake to under 2400mg daily    Follow the DASH diet (lean meats, low salt, whole grains, lots of fruits/vegies)    Stay active, try to get in 30 minutes of exercise daily.    Manage your daily stress.    At  [facility], we strive to deliver an exceptional experience to you, every time we see you.  If you receive a survey in the mail, please send us back your thoughts. We really do value your feedback.    Your care team:                            Family Medicine Internal Medicine   MD Venu Edge MD Shantel Branch-Fleming, MD Katya Georgiev PA-C Megan Hill, APRN CNP    Maurizio Armenta, MD Pediatrics   Cj Horan, CAS Phan, CNP MD Racheal Starr APRN CNP   MD Bre Ramos MD Deborah Mielke, MD Theresa Trimble, APRN [facility]      Clinic hours: Monday - Thursday 7 am-7 pm; Fridays 7 am-5 pm.   Urgent care: Monday - Friday 11 am-9 pm; Saturday and Sunday 9 am-5 pm.  Pharmacy : Monday -Thursday 8 am-8 pm; Friday 8 am-6 pm; Saturday and Sunday 9 am-5 pm.     Clinic: [phone]   Pharmacy: [phone]                Follow-ups after your visit        Follow-up notes from your care team     Return in about 1 week (around 11/13/2018) for PGen, Blood Pressure Check, Fasting Lab Visit.      Who to contact     If you have questions or need follow up information about today's clinic visit or your schedule please contact [facility] directly at [phone].  Normal or non-critical lab and imaging results will be communicated to you by Directa Plushart, letter or phone within 4 business days after the clinic has received the results. If you do not hear from us within 7 days, please contact the clinic through Directa Plushart or phone. If you have a critical or abnormal lab result, we will notify you by phone as soon as possible.  Submit refill requests through remocean or call your pharmacy and they will forward the refill request to us. Please allow 3 business days for your refill to be completed.          Additional Information About Your Visit        Directa Plushart Information     remocean gives you secure access to your electronic health record. If you  "see a primary care provider, you can also send messages to your care team and make appointments. If you have questions, please call your primary care clinic.  If you do not have a primary care provider, please call [phone] and they will assist you.        Care EveryWhere ID     This is your Care EveryWhere ID. This could be used by other organizations to access your [place] medical records  [id number]        Your Vitals Were     Pulse Temperature Height Pulse Oximetry BMI (Body Mass Index)       85 98.4  F (36.9  C) (Oral) 1.753 m (5' 9\") 96% 33.82 kg/m2        Blood Pressure from Last 3 Encounters:   11/06/18 150/84   09/11/18 136/74   07/30/18 134/82    Weight from Last 3 Encounters:   11/06/18 103.9 kg (229 lb)   09/11/18 104.8 kg (231 lb 1.6 oz)   07/30/18 102.9 kg (226 lb 12.8 oz)              Today, you had the following     No orders found for display         Today's Medication Changes          These changes are accurate as of 11/6/18  2:06 PM.  If you have any questions, ask your nurse or doctor.               These medicines have changed or have updated prescriptions.        Dose/Directions    felodipine ER 5 MG 24 hr tablet   Commonly known as:  PLENDIL   This may have changed:  See the new instructions.   Used for:  Hypertension goal BP (blood pressure) < 140/90   Changed by:  Edwin Chang MD        Dose:  5 mg   Take 1 tablet (5 mg) by mouth daily for blood pressure.   Quantity:  30 tablet   Refills:  4            Where to get your medicines      These medications were sent to [facility] [id number] [facility] - [place] [address]  [address] [address]     Phone:  [phone]     felodipine ER 5 MG 24 hr tablet                Primary Care Provider Office Phone # Fax #    Rosalinda Muse [phone] [address]  [place] [place] [facility] [address]        Equal Access to Services     JANE LIND AH: Hadii aad bertha hadasho Soomaali, waaxda luqadaha, qaybta kaalmada " akndy arshaddelfina hankinsaan ah. So [facility] [phone].    ATENCIÓN: Si adolfo jones, tiene a novoa disposición servicios gratuitos de asistencia lingüística. Conchis al [phone].    We comply with applicable federal civil rights laws and [place] laws. We do not discriminate on the basis of race, color, national origin, age, disability, sex, sexual orientation, or gender identity.            Thank you!     Thank you for choosing [facility]  for your care. Our goal is always to provide you with excellent care. Hearing back from our patients is one way we can continue to improve our services. Please take a few minutes to complete the written survey that you may receive in the mail after your visit with us. Thank you!             Your Updated Medication List - Protect others around you: Learn how to safely use, store and throw away your medicines at www.disposemymeds.org.          This list is accurate as of 11/6/18  2:06 PM.  Always use your most recent med list.                   Brand Name Dispense Instructions for use Diagnosis    ARMOUR THYROID 60 MG tablet   Generic drug:  thyroid     90 tablet    TAKE 1 TABLET BY MOUTH EVERY DAY    Hypothyroidism due to acquired atrophy of thyroid       aspirin 325 MG tablet      Take 325 mg by mouth        EPINEPHrine 0.3 MG/0.3ML injection 2-pack    EPIPEN/ADRENACLICK/or ANY BX GENERIC EQUIV    0.6 mL    INJECT 0.3 MLS (0.3 MG) INTO THE MUSCLE ONCE AS NEEDED FOR ANAPHYLAXIS    Food allergy       felodipine ER 5 MG 24 hr tablet    PLENDIL    30 tablet    Take 1 tablet (5 mg) by mouth daily for blood pressure.    Hypertension goal BP (blood pressure) < 140/90       fexofenadine 180 MG tablet    ALLEGRA    30 tablet    Take 1 tablet (180 mg) by mouth daily    Nasal congestion       order for DME      Equipment ordered: Respironics Auto PAP Mask type: Nasal Settings: 5-15 cm

## 2018-11-13 ENCOUNTER — TELEPHONE (OUTPATIENT)
Dept: FAMILY MEDICINE | Facility: CLINIC | Age: 63
End: 2018-11-13

## 2018-11-13 ENCOUNTER — ALLIED HEALTH/NURSE VISIT (OUTPATIENT)
Dept: NURSING | Facility: CLINIC | Age: 63
End: 2018-11-13
Payer: COMMERCIAL

## 2018-11-13 VITALS — SYSTOLIC BLOOD PRESSURE: 138 MMHG | DIASTOLIC BLOOD PRESSURE: 80 MMHG

## 2018-11-13 DIAGNOSIS — I10 ESSENTIAL HYPERTENSION WITH GOAL BLOOD PRESSURE LESS THAN 140/90: Primary | ICD-10-CM

## 2018-11-13 DIAGNOSIS — R73.9 HYPERGLYCEMIA: ICD-10-CM

## 2018-11-13 DIAGNOSIS — R16.0 ENLARGED LIVER: ICD-10-CM

## 2018-11-13 DIAGNOSIS — E03.4 HYPOTHYROIDISM DUE TO ACQUIRED ATROPHY OF THYROID: Chronic | ICD-10-CM

## 2018-11-13 LAB
ALBUMIN SERPL-MCNC: 3.8 G/DL (ref 3.4–5)
ALP SERPL-CCNC: 79 U/L (ref 40–150)
ALT SERPL W P-5'-P-CCNC: 21 U/L (ref 0–50)
ANION GAP SERPL CALCULATED.3IONS-SCNC: 4 MMOL/L (ref 3–14)
AST SERPL W P-5'-P-CCNC: 18 U/L (ref 0–45)
BILIRUB SERPL-MCNC: 0.4 MG/DL (ref 0.2–1.3)
BUN SERPL-MCNC: 14 MG/DL (ref 7–30)
CALCIUM SERPL-MCNC: 8.9 MG/DL (ref 8.5–10.1)
CHLORIDE SERPL-SCNC: 104 MMOL/L (ref 94–109)
CO2 SERPL-SCNC: 32 MMOL/L (ref 20–32)
CREAT SERPL-MCNC: 0.69 MG/DL (ref 0.52–1.04)
GFR SERPL CREATININE-BSD FRML MDRD: 85 ML/MIN/1.7M2
GLUCOSE SERPL-MCNC: 117 MG/DL (ref 70–99)
HBA1C MFR BLD: 6 % (ref 0–5.6)
POTASSIUM SERPL-SCNC: 3.9 MMOL/L (ref 3.4–5.3)
PROT SERPL-MCNC: 7.5 G/DL (ref 6.8–8.8)
SODIUM SERPL-SCNC: 140 MMOL/L (ref 133–144)
T3 SERPL-MCNC: 159 NG/DL (ref 60–181)
T4 FREE SERPL-MCNC: 0.97 NG/DL (ref 0.76–1.46)
TSH SERPL DL<=0.005 MIU/L-ACNC: 1.9 MU/L (ref 0.4–4)

## 2018-11-13 PROCEDURE — 84439 ASSAY OF FREE THYROXINE: CPT | Performed by: FAMILY MEDICINE

## 2018-11-13 PROCEDURE — 99207 ZZC NO CHARGE NURSE ONLY: CPT

## 2018-11-13 PROCEDURE — 84480 ASSAY TRIIODOTHYRONINE (T3): CPT | Performed by: FAMILY MEDICINE

## 2018-11-13 PROCEDURE — 84443 ASSAY THYROID STIM HORMONE: CPT | Performed by: FAMILY MEDICINE

## 2018-11-13 PROCEDURE — 36415 COLL VENOUS BLD VENIPUNCTURE: CPT | Performed by: FAMILY MEDICINE

## 2018-11-13 PROCEDURE — 83036 HEMOGLOBIN GLYCOSYLATED A1C: CPT | Performed by: FAMILY MEDICINE

## 2018-11-13 PROCEDURE — 80053 COMPREHEN METABOLIC PANEL: CPT | Performed by: FAMILY MEDICINE

## 2018-11-13 NOTE — PROGRESS NOTES
Marie Kaiser is a 63 year old patient who comes in today for a Blood Pressure check.  Initial BP:  /80     Data Unavailable  Disposition: results routed to provider

## 2018-11-13 NOTE — MR AVS SNAPSHOT
After Visit Summary   11/13/2018    Marie Kaiser    MRN: [id number]           Patient Information     Date Of Birth          1955        Visit Information        Provider Department      11/13/2018 9:20 AM BA ANCILLARY [facility]        Today's Diagnoses     Essential hypertension with goal blood pressure less than 140/90    -  1       Follow-ups after your visit        Who to contact     If you have questions or need follow up information about today's clinic visit or your schedule please contact [facility] directly at [phone].  Normal or non-critical lab and imaging results will be communicated to you by Ecalhart, letter or phone within 4 business days after the clinic has received the results. If you do not hear from us within 7 days, please contact the clinic through Plexxit or phone. If you have a critical or abnormal lab result, we will notify you by phone as soon as possible.  Submit refill requests through Revance [employer] or call your pharmacy and they will forward the refill request to us. Please allow 3 business days for your refill to be completed.          Additional Information About Your Visit        MyChart Information     [employer] gives you secure access to your electronic health record. If you see a primary care provider, you can also send messages to your care team and make appointments. If you have questions, please call your primary care clinic.  If you do not have a primary care provider, please call [phone] and they will assist you.        Care EveryWhere ID     This is your Care EveryWhere ID. This could be used by other organizations to access your [place] medical records  [id number]         Blood Pressure from Last 3 Encounters:   11/13/18 138/80   11/06/18 150/84   09/11/18 136/74    Weight from Last 3 Encounters:   11/06/18 103.9 kg (229 lb)   09/11/18 104.8 kg (231 lb 1.6 oz)   07/30/18 102.9 kg (226 lb 12.8 oz)               Today, you had the following     No orders found for display       Primary Care Provider Office Phone # Fax #    Rosalinda Muse [phone] [address]  [place] [facility] [address]        Equal Access to Services     JANE LIND : Hadii jeff ku hadrosalioo Soomaali, waaxda luqadaha, qaybta kaalmada adeegyada, kandy harryin hayaan kadiedelfina melgar lajonathanjing rutledge. So [facility] [phone].    ATENCIÓN: Si habla español, tiene a novoa disposición servicios gratuitos de asistencia lingüística. Llame al [phone].    We comply with applicable federal civil rights laws and [place] laws. We do not discriminate on the basis of race, color, national origin, age, disability, sex, sexual orientation, or gender identity.            Thank you!     Thank you for choosing [facility]  for your care. Our goal is always to provide you with excellent care. Hearing back from our patients is one way we can continue to improve our services. Please take a few minutes to complete the written survey that you may receive in the mail after your visit with us. Thank you!             Your Updated Medication List - Protect others around you: Learn how to safely use, store and throw away your medicines at www.disposemymeds.org.          This list is accurate as of 11/13/18 10:53 AM.  Always use your most recent med list.                   Brand Name Dispense Instructions for use Diagnosis    ARMOUR THYROID 60 MG tablet   Generic drug:  thyroid     90 tablet    TAKE 1 TABLET BY MOUTH EVERY DAY    Hypothyroidism due to acquired atrophy of thyroid       aspirin 325 MG tablet      Take 325 mg by mouth        EPINEPHrine 0.3 MG/0.3ML injection 2-pack    EPIPEN/ADRENACLICK/or ANY BX GENERIC EQUIV    0.6 mL    INJECT 0.3 MLS (0.3 MG) INTO THE MUSCLE ONCE AS NEEDED FOR ANAPHYLAXIS    Food allergy       felodipine ER 5 MG 24 hr tablet    PLENDIL    30 tablet    Take 1 tablet (5 mg) by mouth daily for blood pressure.     Hypertension goal BP (blood pressure) < 140/90       fexofenadine 180 MG tablet    ALLEGRA    30 tablet    Take 1 tablet (180 mg) by mouth daily    Nasal congestion       order for DME      Equipment ordered: Respironics Auto PAP Mask type: Nasal Settings: 5-15 cm

## 2018-11-13 NOTE — TELEPHONE ENCOUNTER
Reason for Call:  Other call back    Detailed comments: Pt calling for she has questions and concerns regarding MRSA that she would like to discuss as soon as possible for she is concerned.    Phone Number Patient can be reached at: Cell number on file:    Telephone Information:   Mobile [phone]       Best Time: anytime    Can we leave a detailed message on this number? YES    Call taken on 11/13/2018 at 1:35 PM by Mack Alvares

## 2018-11-13 NOTE — TELEPHONE ENCOUNTER
Patient called and wanted to be educated on MRSA.  Educated her by using [facility] clinical reference workbench.  Educated her on how it's spread, what the abbreviation stands for, how to prevent it.  She states that her Granddaughter was recently told she had it on a wound on her abdomen.  Vonda Velasquez RN

## 2018-11-14 NOTE — PROGRESS NOTES
"Your thyroid testing indicates you are on the correct dosage of thyroid hormone with your hormone levels being in the normal/optimal range.  Your kidney and liver testing are normal.   The long term blood sugar testing is borderline elevated but not to the point of diabetes. This is in the \"prediabetic\" range.  Exercise and limiting carbohydrate and sugars in diet can be helpful at preventing progression to diabetes.  Please call or MyChart message me if you have any questions.    PSK  "

## 2018-11-21 ENCOUNTER — TRANSFERRED RECORDS (OUTPATIENT)
Dept: HEALTH INFORMATION MANAGEMENT | Facility: CLINIC | Age: 63
End: 2018-11-21

## 2018-12-04 ENCOUNTER — OFFICE VISIT (OUTPATIENT)
Dept: FAMILY MEDICINE | Facility: CLINIC | Age: 63
End: 2018-12-04
Payer: COMMERCIAL

## 2018-12-04 VITALS
SYSTOLIC BLOOD PRESSURE: 136 MMHG | HEART RATE: 79 BPM | OXYGEN SATURATION: 96 % | DIASTOLIC BLOOD PRESSURE: 84 MMHG | TEMPERATURE: 97.8 F

## 2018-12-04 DIAGNOSIS — R05.9 COUGH: ICD-10-CM

## 2018-12-04 DIAGNOSIS — J32.0 RIGHT MAXILLARY SINUSITIS: Primary | ICD-10-CM

## 2018-12-04 DIAGNOSIS — R07.0 THROAT PAIN: ICD-10-CM

## 2018-12-04 DIAGNOSIS — Z12.31 VISIT FOR SCREENING MAMMOGRAM: ICD-10-CM

## 2018-12-04 DIAGNOSIS — J45.20 MILD INTERMITTENT ASTHMA WITHOUT COMPLICATION: Chronic | ICD-10-CM

## 2018-12-04 LAB
DEPRECATED S PYO AG THROAT QL EIA: NORMAL
SPECIMEN SOURCE: NORMAL

## 2018-12-04 PROCEDURE — 87880 STREP A ASSAY W/OPTIC: CPT | Performed by: PHYSICIAN ASSISTANT

## 2018-12-04 PROCEDURE — 99213 OFFICE O/P EST LOW 20 MIN: CPT | Performed by: PHYSICIAN ASSISTANT

## 2018-12-04 PROCEDURE — 87081 CULTURE SCREEN ONLY: CPT | Performed by: PHYSICIAN ASSISTANT

## 2018-12-04 RX ORDER — ALBUTEROL SULFATE 90 UG/1
2 AEROSOL, METERED RESPIRATORY (INHALATION) EVERY 6 HOURS PRN
Qty: 1 INHALER | Refills: 1 | Status: SHIPPED | OUTPATIENT
Start: 2018-12-04

## 2018-12-04 NOTE — PROGRESS NOTES
Candelario Salazar  Your rapid strep test was negative.   Please call or MyChart my office with any questions or concerns.    Yanni Moura, PAC

## 2018-12-04 NOTE — MR AVS SNAPSHOT
After Visit Summary   12/4/2018    Marie Kaiser    MRN: [id number]           Patient Information     Date Of Birth          1955        Visit Information        Provider Department      12/4/2018 10:20 AM Yanni Moura PA-C [facility]        Today's Diagnoses     Throat pain    -  1    Visit for screening mammogram        Right maxillary sinusitis        Cough        Mild intermittent asthma without complication          Care Instructions    Schedule mammogram at [facility] ([phone]).    Schedule pap smear and physical with Dr. Muse    Take augmentin twice a day for 10 days  Use albuterol inhaler as needed for cough  Follow up with us if no improvement over the next 6 days  Return urgently if any change in symptoms like increasing cough, fever, shortness of breath or other change in symptoms.      NON PRESCRIPTION TREATMENT    Mucinex 600 mg 2 tabs twice a day  Increase humidity to 30-40% in bedroom at night - vaporizer  Avoid decongestant  Saline nasal spray as needed  Increase fluid intake  Benadryl 25mg 1/2 - 1 hour before bed time  Maintain 8 hr minimum of sleep at night  Robitussin DM cough gels for cough           Follow-ups after your visit        Follow-up notes from your care team     Return in about 6 weeks (around 1/15/2019), or if symptoms worsen or fail to improve, for Physical Exam.      Future tests that were ordered for you today     Open Future Orders        Priority Expected Expires Ordered    MA SCREENING DIGITAL BILAT - Future  (s+30) Routine  12/4/2019 12/4/2018            Who to contact     If you have questions or need follow up information about today's clinic visit or your schedule please contact [facility] directly at [phone].  Normal or non-critical lab and imaging results will be communicated to you by MyChart, letter or phone within 4 business days after the clinic has  received the results. If you do not hear from us within 7 days, please contact the clinic through PressConnect or phone. If you have a critical or abnormal lab result, we will notify you by phone as soon as possible.  Submit refill requests through PressConnect or call your pharmacy and they will forward the refill request to us. Please allow 3 business days for your refill to be completed.          Additional Information About Your Visit        Grove [employer]Be[employer] Information     PressConnect gives you secure access to your electronic health record. If you see a primary care provider, you can also send messages to your care team and make appointments. If you have questions, please call your primary care clinic.  If you do not have a primary care provider, please call [phone] and they will assist you.        Care EveryWhere ID     This is your Care EveryWhere ID. This could be used by other organizations to access your [place] medical records  [id number]        Your Vitals Were     Pulse Temperature Last Period Pulse Oximetry Breastfeeding?       79 97.8  F (36.6  C) (Oral) (LMP Unknown) 96% No        Blood Pressure from Last 3 Encounters:   12/04/18 136/84   11/13/18 138/80   11/06/18 150/84    Weight from Last 3 Encounters:   11/06/18 103.9 kg (229 lb)   09/11/18 104.8 kg (231 lb 1.6 oz)   07/30/18 102.9 kg (226 lb 12.8 oz)              We Performed the Following     Rapid strep screen          Today's Medication Changes          These changes are accurate as of 12/4/18 10:40 AM.  If you have any questions, ask your nurse or doctor.               Start taking these medicines.        Dose/Directions    albuterol 108 (90 Base) MCG/ACT inhaler   Commonly known as:  PROAIR HFA/PROVENTIL HFA/VENTOLIN HFA   Used for:  Cough, Mild intermittent asthma without complication   Started by:  Yanni Moura PA-C        Dose:  2 puff   Inhale 2 puffs into the lungs every 6 hours as needed for shortness of breath / dyspnea or wheezing    Quantity:  1 Inhaler   Refills:  1       amoxicillin-clavulanate 875-125 MG tablet   Commonly known as:  AUGMENTIN   Used for:  Right maxillary sinusitis   Started by:  Yanni Moura PA-C        Dose:  1 tablet   Take 1 tablet by mouth 2 times daily   Quantity:  20 tablet   Refills:  0            Where to get your medicines      These medications were sent to [facility] [id number] [facility] [address]  [address] [place] [address]     Phone:  [phone]     albuterol 108 (90 Base) MCG/ACT inhaler    amoxicillin-clavulanate 875-125 MG tablet                Primary Care Provider Office Phone # Fax #    Rosalinda Muse [phone] [address]  [place] [facility] [address]        Equal Access to Services     [facility]: Hadii ejff stone hadasho Soomaali, waaxda luqadaha, qaybta kaalmada adeegyada, waxay harryin haynilsa linares . So [facility] [phone].    ATENCIÓN: Si habla español, tiene a novoa disposición servicios gratuitos de asistencia lingüística. Conchis al [phone].    We comply with applicable federal civil rights laws and [place] laws. We do not discriminate on the basis of race, color, national origin, age, disability, sex, sexual orientation, or gender identity.            Thank you!     Thank you for choosing [facility]  for your care. Our goal is always to provide you with excellent care. Hearing back from our patients is one way we can continue to improve our services. Please take a few minutes to complete the written survey that you may receive in the mail after your visit with us. Thank you!             Your Updated Medication List - Protect others around you: Learn how to safely use, store and throw away your medicines at www.disposemymeds.org.          This list is accurate as of 12/4/18 10:40 AM.  Always use your most recent med list.                   Brand Name Dispense Instructions for use Diagnosis    albuterol 108 (90  Base) MCG/ACT inhaler    PROAIR HFA/PROVENTIL HFA/VENTOLIN HFA    1 Inhaler    Inhale 2 puffs into the lungs every 6 hours as needed for shortness of breath / dyspnea or wheezing    Cough, Mild intermittent asthma without complication       amoxicillin-clavulanate 875-125 MG tablet    AUGMENTIN    20 tablet    Take 1 tablet by mouth 2 times daily    Right maxillary sinusitis       ARMOUR THYROID 60 MG tablet   Generic drug:  thyroid     90 tablet    TAKE 1 TABLET BY MOUTH EVERY DAY    Hypothyroidism due to acquired atrophy of thyroid       aspirin 325 MG tablet    ASA     Take 81 mg by mouth        EPINEPHrine 0.3 MG/0.3ML injection 2-pack    EPIPEN/ADRENACLICK/or ANY BX GENERIC EQUIV    0.6 mL    INJECT 0.3 MLS (0.3 MG) INTO THE MUSCLE ONCE AS NEEDED FOR ANAPHYLAXIS    Food allergy       felodipine ER 5 MG 24 hr tablet    PLENDIL    30 tablet    Take 1 tablet (5 mg) by mouth daily for blood pressure.    Hypertension goal BP (blood pressure) < 140/90       fexofenadine 180 MG tablet    ALLEGRA    30 tablet    Take 1 tablet (180 mg) by mouth daily    Nasal congestion       order for DME      Equipment ordered: Respironics Auto PAP Mask type: Nasal Settings: 5-15 cm

## 2018-12-04 NOTE — PATIENT INSTRUCTIONS
Schedule mammogram at [facility] ([phone]).    Schedule pap smear and physical with Dr. Muse    Take augmentin twice a day for 10 days  Use albuterol inhaler as needed for cough  Follow up with us if no improvement over the next 6 days  Return urgently if any change in symptoms like increasing cough, fever, shortness of breath or other change in symptoms.      NON PRESCRIPTION TREATMENT    Mucinex 600 mg 2 tabs twice a day  Increase humidity to 30-40% in bedroom at night - vaporizer  Avoid decongestant  Saline nasal spray as needed  Increase fluid intake  Benadryl 25mg 1/2 - 1 hour before bed time  Maintain 8 hr minimum of sleep at night  Robitussin DM cough gels for cough

## 2018-12-04 NOTE — PROGRESS NOTES
"  SUBJECTIVE:   Marie Kaiser is a 63 year old female who presents to clinic today for the following health issues:      Acute Illness   Acute illness concerns: Sinus Infection  Onset: 6 days     Fever: no     Chills/Sweats: YES    Headache (location?): YES    Sinus Pressure:YES    Conjunctivitis:  no    Ear Pain: No    Rhinorrhea: no but was at first     Congestion: YES- green mucus     Sore Throat: YES     Cough: YES-non-productive     Wheeze: no     Decreased Appetite: YES    Nausea: no     Vomiting: no     Diarrhea:  no     Dysuria/Freq.: no     Fatigue/Achiness: no     Sick/Strep Exposure: no      Therapies Tried and outcome: None       Symptoms started out as a cold.  3 days ago felt like improving but then worse next day (2 days ago) \"doubley worse 2 days ago\".   Pain in maxillary sinuses and frontal headache   Lost voice during day yesterday. Tried to work yesterday  By 5 pm voice worse  This morning and last night green groupy stuff from nose.  Won't come out.   Feels like maxillary sinuses Swollen and more painful.   Postnasal drainage thick   Gargling with salt water and helps momentarily  No fever but chilled last night. And then too hot and after that ok.  Reports that frequently gets bronchitis.    Also requests refill of albuterol inhaler- typically helps     Problem list and histories reviewed & adjusted, as indicated.  Additional history: as documented    Patient Active Problem List   Diagnosis     Intermittent asthma     Major depressive disorder, recurrent episode, in full remission (HCC)     Hypothyroidism     Impaired fasting glucose     Hyperlipidemia LDL goal <130     Essential hypertension with goal blood pressure less than 140/90     Advanced directives, counseling/discussion     History of diverticulitis of colon     A-fib (H)     Obesity, Class I, BMI 30-34.9     DONA (obstructive sleep apnea)- mild (AHI 5)     Gastroesophageal reflux disease without esophagitis     Past Surgical " History:   Procedure Laterality Date     CARPAL TUNNEL RELEASE RT/LT      bilaterally     COLONOSCOPY  8/8/2011    Procedure:COLONOSCOPY; Surgeon:KEISHA DAVIS; Location:[facility]     HC DILATION/CURETTAGE DIAG/THER NON OB      Dr. Sanchez       Social History   Substance Use Topics     Smoking status: Never Smoker     Smokeless tobacco: Never Used     Alcohol use No     Family History   Problem Relation Age of Onset     Asthma Father      GASTROINTESTINAL DISEASE Father      hiatal hernia/acid reflux     Heart Disease Brother      Afib     Asthma Brother      Respiratory Brother      sleep apnea     Diabetes Brother      Hypertension Brother      Depression Brother      Respiratory Sister      sleep apnea     Hypertension Sister      Thyroid Disease Sister      Obesity Sister      Depression Sister      Diabetes Sister      Alzheimer Disease Mother      advanced dementia     Allergies Daughter      cats     Diabetes Other      neice and nephew     C.A.D. No family hx of      Breast Cancer No family hx of      Cancer - colorectal No family hx of      Prostate Cancer No family hx of      Cancer No family hx of          Current Outpatient Prescriptions   Medication Sig Dispense Refill                   ARMOUR THYROID 60 MG tablet TAKE 1 TABLET BY MOUTH EVERY DAY 90 tablet 0     aspirin 325 MG tablet Take 81 mg by mouth        EPINEPHrine (EPIPEN/ADRENACLICK/OR ANY BX GENERIC EQUIV) 0.3 MG/0.3ML injection 2-pack INJECT 0.3 MLS (0.3 MG) INTO THE MUSCLE ONCE AS NEEDED FOR ANAPHYLAXIS 0.6 mL 0     felodipine ER (PLENDIL) 5 MG 24 hr tablet Take 1 tablet (5 mg) by mouth daily for blood pressure. 30 tablet 4     fexofenadine (ALLEGRA) 180 MG tablet Take 1 tablet (180 mg) by mouth daily 30 tablet 1     order for DME Equipment ordered: Respironics Auto PAP Mask type: Nasal Settings: 5-15 cm       Allergies   Allergen Reactions     Cozaar Swelling     Cymbalta      Elevated blood pressure     Lexapro      Anxiety, tremors.  "    Zoloft      Joint pain, kidney pain     Ciprofloxacin      \"tendons on feet pull and can't walk\"     Erythromycin      Other reaction(s): Vomiting     Flonase [Fluticasone Propionate]      Nose Bleeds     Latex Swelling     Difficult breathing, tissue swelling     Latex      Levaquin Other (See Comments)     Muscle cramps     Lisinopril Cough     Metoprolol      Nausea, dry mouth, shortness of breath, difficulty urinating, swelling of hands     Metronidazole Nausea and Vomiting     Morphine      Made her \"shut down\"     Prozac [Fluoxetine Hcl]      suicidal     Spironolactone      Kidney problems     Synthroid [Levothyroxine Sodium]      Heart raced     Ceftin Itching       Reviewed and updated as needed this visit by clinical staff  Tobacco  Allergies  Meds  Problems  Med Hx  Surg Hx  Fam Hx  Soc Hx        Reviewed and updated as needed this visit by Provider  Tobacco  Allergies  Meds  Problems  Med Hx  Surg Hx  Fam Hx  Soc Hx          ROS:  Constitutional, HEENT, cardiovascular, pulmonary, gi and gu systems are negative, except as otherwise noted.    OBJECTIVE:     /84 (BP Location: Right arm, Patient Position: Chair, Cuff Size: Adult Large)  Pulse 79  Temp 97.8  F (36.6  C) (Oral)  LMP  (LMP Unknown)  SpO2 96%  Breastfeeding? No  There is no height or weight on file to calculate BMI.  GENERAL: healthy, alert and no distress  EYES: Eyes grossly normal to inspection, PERRL and conjunctivae and sclerae normal  HENT: normal cephalic/atraumatic, ear canals and TM's normal, nose and mouth without ulcers or lesions, oropharynx clear, oral mucous membranes moist and sinuses: maxillary, frontal tenderness on right  NECK: no adenopathy, no asymmetry, masses, or scars and thyroid normal to palpation  RESP: lungs clear to auscultation - no rales, rhonchi or wheezes  CV: regular rate and rhythm, normal S1 S2, no S3 or S4, no murmur, click or rub, no peripheral edema and peripheral pulses " strong  MS: no gross musculoskeletal defects noted, no edema    Diagnostic Test Results:  Negative strep test     ASSESSMENT/PLAN:             1. Right maxillary sinusitis  Will treat with augmentin.  Follow up with us if no improvement in symptoms over the next week.  Return urgently if any change in symptoms.    - amoxicillin-clavulanate (AUGMENTIN) 875-125 MG tablet; Take 1 tablet by mouth 2 times daily  Dispense: 20 tablet; Refill: 0    2. Mild intermittent asthma without complication  Requests refill of inhaler- lungs clear to auscultation bilaterally - albuterol as needed for cough  - albuterol (PROAIR HFA/PROVENTIL HFA/VENTOLIN HFA) 108 (90 Base) MCG/ACT inhaler; Inhale 2 puffs into the lungs every 6 hours as needed for shortness of breath / dyspnea or wheezing  Dispense: 1 Inhaler; Refill: 1    3. Visit for screening mammogram  Encouraged to schedule mammogram   - MA SCREENING DIGITAL BILAT - Future  (s+30); Future    4. Throat pain  Negative strep test   - Rapid strep screen  - Beta strep group A culture    5. Cough  Lungs clear to auscultation bilaterally   - albuterol (PROAIR HFA/PROVENTIL HFA/VENTOLIN HFA) 108 (90 Base) MCG/ACT inhaler; Inhale 2 puffs into the lungs every 6 hours as needed for shortness of breath / dyspnea or wheezing  Dispense: 1 Inhaler; Refill: 1    Patient Instructions   Schedule mammogram at [facility] ([phone]).    Schedule pap smear and physical with Dr. Muse    Take augmentin twice a day for 10 days  Use albuterol inhaler as needed for cough  Follow up with us if no improvement over the next 6 days  Return urgently if any change in symptoms like increasing cough, fever, shortness of breath or other change in symptoms.      NON PRESCRIPTION TREATMENT    Mucinex 600 mg 2 tabs twice a day  Increase humidity to 30-40% in bedroom at night - vaporizer  Avoid decongestant  Saline nasal spray as needed  Increase fluid intake  Benadryl 25mg  1/2 - 1 hour before bed time  Maintain 8 hr minimum of sleep at night  Robitussin DM cough gels for cough        Yanni Moura PA-C  [facility]

## 2018-12-05 LAB
BACTERIA SPEC CULT: NORMAL
SPECIMEN SOURCE: NORMAL

## 2018-12-05 NOTE — PROGRESS NOTES
Candelario Salazar  Your strep culture was negative.   Please call or MyChart my office with any questions or concerns.    Yanni Mouar, PAC

## 2019-01-10 DIAGNOSIS — E03.4 HYPOTHYROIDISM DUE TO ACQUIRED ATROPHY OF THYROID: Chronic | ICD-10-CM

## 2019-01-10 NOTE — TELEPHONE ENCOUNTER
"Requested Prescriptions   Pending Prescriptions Disp Refills     INEZ THYROID 60 MG tablet [Pharmacy Med Name: INEZ THYROID 60 MG TABLET] 90 tablet 0      Last Written Prescription Date:  10/12/18  Last Fill Quantity: 90,  # refills: 0   Last Office Visit with [facility] or [facility] prescribing provider:  12/04/18-Zeny   Future Office Visit:    Sig: TAKE 1 TABLET BY MOUTH EVERY DAY    Thyroid Protocol Passed - 1/10/2019  1:10 PM       Passed - Patient is 12 years or older       Passed - Recent (12 mo) or future (30 days) visit within the authorizing provider's specialty    Patient had office visit in the last 12 months or has a visit in the next 30 days with authorizing provider or within the authorizing provider's specialty.  See \"Patient Info\" tab in inbasket, or \"Choose Columns\" in Meds & Orders section of the refill encounter.             Passed - Medication is active on med list       Passed - Normal TSH on file in past 12 months    Recent Labs   Lab Test 11/13/18  0922   TSH 1.90             Passed - No active pregnancy on record    If patient is pregnant or has had a positive pregnancy test, please check TSH.         Passed - No positive pregnancy test in past 12 months    If patient is pregnant or has had a positive pregnancy test, please check TSH.            "

## 2019-01-11 RX ORDER — THYROID 60 MG
TABLET ORAL
Qty: 90 TABLET | Refills: 2 | Status: SHIPPED | OUTPATIENT
Start: 2019-01-11 | End: 2019-08-27

## 2019-02-15 ENCOUNTER — HEALTH MAINTENANCE LETTER (OUTPATIENT)
Age: 64
End: 2019-02-15

## 2019-02-28 ENCOUNTER — OFFICE VISIT (OUTPATIENT)
Dept: FAMILY MEDICINE | Facility: CLINIC | Age: 64
End: 2019-02-28
Payer: COMMERCIAL

## 2019-02-28 VITALS
TEMPERATURE: 97.6 F | SYSTOLIC BLOOD PRESSURE: 136 MMHG | HEIGHT: 69 IN | HEART RATE: 71 BPM | OXYGEN SATURATION: 98 % | DIASTOLIC BLOOD PRESSURE: 84 MMHG | WEIGHT: 230.2 LBS | BODY MASS INDEX: 34.1 KG/M2

## 2019-02-28 DIAGNOSIS — I10 ESSENTIAL HYPERTENSION WITH GOAL BLOOD PRESSURE LESS THAN 140/90: Primary | ICD-10-CM

## 2019-02-28 PROCEDURE — 99214 OFFICE O/P EST MOD 30 MIN: CPT | Performed by: NURSE PRACTITIONER

## 2019-02-28 ASSESSMENT — MIFFLIN-ST. JEOR: SCORE: 1658.56

## 2019-02-28 NOTE — PROGRESS NOTES
PGEN study visit  UNCONTROLLED HYPERTENSION ARM visit 8    SUBJECTIVE:  Marie is here for 8th PGEN research study visit. Please refer to research tab in the header and today's flow sheet for further details. Patient had uncontrolled  hypertension at enrollment and has a goal of <  140/90 (per Problem list target chosen by PCP)     Prior research note reviewed. Patient is on blood pressure medication(s) at this time.  she has been taking Felodipine 5 mg daily and is tolerating the medication well.      Current diet & lifestyle: low carb, low salt, no dairy or wheat.  Exercising occasionally but has been shoveling, does some yoga.  Smoking: nonsmoker  Alcohol consumption none  Other pertinent history none      BP Readings from Last 3 Encounters:   02/28/19 152/90   12/04/18 136/84   11/13/18 138/80       Current Outpatient Medications   Medication Sig Dispense Refill     albuterol (PROAIR HFA/PROVENTIL HFA/VENTOLIN HFA) 108 (90 Base) MCG/ACT inhaler Inhale 2 puffs into the lungs every 6 hours as needed for shortness of breath / dyspnea or wheezing 1 Inhaler 1     ARMOUR THYROID 60 MG tablet TAKE 1 TABLET BY MOUTH EVERY DAY 90 tablet 2     aspirin 325 MG tablet Take 81 mg by mouth        EPINEPHrine (EPIPEN/ADRENACLICK/OR ANY BX GENERIC EQUIV) 0.3 MG/0.3ML injection 2-pack INJECT 0.3 MLS (0.3 MG) INTO THE MUSCLE ONCE AS NEEDED FOR ANAPHYLAXIS 0.6 mL 0     felodipine ER (PLENDIL) 5 MG 24 hr tablet Take 1 tablet (5 mg) by mouth daily for blood pressure. 30 tablet 4     fexofenadine (ALLEGRA) 180 MG tablet Take 1 tablet (180 mg) by mouth daily 30 tablet 1     order for DME Equipment ordered: Respironics Auto PAP Mask type: Nasal Settings: 5-15 cm       Potassium   Date Value Ref Range Status   11/13/2018 3.9 3.4 - 5.3 mmol/L Final     Creatinine   Date Value Ref Range Status   11/13/2018 0.69 0.52 - 1.04 mg/dL Final     Urea Nitrogen   Date Value Ref Range Status   11/13/2018 14 7 - 30 mg/dL Final     GFR Estimate   Date  "Value Ref Range Status   11/13/2018 85 >60 mL/min/1.7m2 Final     Comment:     Non  GFR Calc      A baseline potassium, creatinine, BUN, GFR has been done within past 12 months      OBJECTIVE:  Patient in in no apparent distress and able to provide full history for today's encounter. she denies pain or any current illness   Vitals: /90   Pulse 71   Temp 97.6  F (36.4  C) (Oral)   Ht 1.753 m (5' 9\")   Wt 104.4 kg (230 lb 3.2 oz)   LMP  (LMP Unknown)   SpO2 98%   BMI 33.99 kg/m    Today's BP completed using cuff size: regular on right side arm.    Pulse Readings from Last 1 Encounters:   02/28/19 71     Is pulse 55 or greater? - Yes    BMI= Body mass index is 33.99 kg/m .  Other exam findings     GENERAL: healthy, alert and no distress  EYES: Eyes grossly normal to inspection, PERRL, EOMI, sclerae white and conjunctivae normal  RESP: lungs clear to auscultation - no crackles or wheezes, no areas of dullness, no tachypnea  CV: Heart regular rate and rhythm without murmur, click or rub. No peripheral edema and peripheral pulses strong  MS: no gross musculoskeletal defects noted, no edema  SKIN: no suspicious lesions or rashes  NEURO: Normal strength and tone, sensory exam grossly normal, mentation intact, oriented times 3 and cranial nerves 2-12 intact  PSYCH: mentation appears normal, affect normal/bright       ASSESSMENT/PLAN:   PGEN Flowsheet has been  'filed' ) for this visit (this saves flowsheet data)    Blood pressure reading today is at the provider specified goal of <140/90.      1.  Based on PGEN RN HTN MGMT standing order the patient will be advised continue current medication regimen unchanged.     2.  We will not be checking a metabolic lab panel today.      3.  Follow up instructions include:     Next Provider visit: Follow up in 6 months..    See avs for more details.  Full research packet also provide to patient previously    Patient was counseled regarding the lifestyle " changes (listed below)  to help with BP management Yes  Lifestyle changes that can help control high blood pressure:  Even though PGEN is a study to test effectiveness of genetically guided medications for managing high blood pressure, there are several things you can do to ensure your blood pressure stays in good control:    Maintain a healthy weight (BMI<26). A modest amount of weight loss can be helpful    Limit salt intake to under 2400mg daily    Follow the DASH diet (lean meats, low salt, whole grains, lots of fruits/vegies)    Stay active, try to get in 30 minutes of exercise daily.    Manage your daily stress.    Do not smoke cigarettes (or cut back)    Limit alcohol (2 drinks/day for men, 1 drink/day for women)  Was AVS  provided to patient with the relevant <dot>PGENPI dot phrase pulled into patient instructions Yes    Patient was advised to follow up with PCP  to continue ongoing care of HTN since study visits are now complete.      Bebe ATKINSON, CNP

## 2019-02-28 NOTE — TELEPHONE ENCOUNTER
Waiting for MD for LP.   felodipine ER-Routing refill request to provider for review/approval because:  Drug not on the [facility] refill protocol   Kanchan Troncoso RN.

## 2019-02-28 NOTE — PATIENT INSTRUCTIONS
At [facility], we strive to deliver an exceptional experience to you, every time we see you.  If you receive a survey in the mail, please send us back your thoughts. We really do value your feedback.    Your care team:                            Family Medicine Internal Medicine   MD Venu Edge MD Shantel Branch-Fleming, MD Katya Georgiev PA-C Megan Hill, APRN CNP    Maurizio Armenta, MD Pediatrics   Cj Horan, CAS Phan, MD Racheal Austin APRN CNP   MD Bre Ramos MD Deborah Mielke, MD Theresa Trimble, APRN [facility]      Clinic hours: Monday - Thursday 7 am-7 pm; Fridays 7 am-5 pm.   Urgent care: Monday - Friday 11 am-9 pm; Saturday and Sunday 9 am-5 pm.  Pharmacy : Monday -Thursday 8 am-8 pm; Friday 8 am-6 pm; Saturday and Sunday 9 am-5 pm.     Clinic: [phone]   Pharmacy: [phone]    [facility] Hypertension Study  Visit 8     Thank you for your participation in the hypertension study!     Please continue taking your hypertension medications as directed and follow up with your physician as directed. If you have any questions, please contact your physician s office.    You have been given the results of your genetic profile for your own records.  Please contact the research coordinator at [phone] if you have any questions related to the study.      Please contact your doctor/provider with any other health related questions.

## 2019-07-17 ENCOUNTER — TELEPHONE (OUTPATIENT)
Dept: FAMILY MEDICINE | Facility: CLINIC | Age: 64
End: 2019-07-17

## 2019-07-17 NOTE — TELEPHONE ENCOUNTER
Panel Management Review   One phone call and send letter if unable to reach them or [id number] message and send letter if not read after 2 weeks (You will get a message to your inbasket)      BP Readings from Last 1 Encounters:   02/28/19 136/84        Health Maintenance Due   Topic Date Due     HEPATITIS C SCREENING  1955     DEPRESSION ACTION PLAN  1955     HIV SCREENING  10/08/1969     ZOSTER IMMUNIZATION (1 of 2) 10/08/2004     ADVANCE CARE PLANNING  06/21/2016     MAMMO SCREENING  05/29/2017     PREVENTIVE CARE VISIT  01/16/2018     ASTHMA CONTROL TEST  11/14/2018     PHQ-9  11/14/2018     PAP  11/16/2018     ASTHMA ACTION PLAN  05/20/2019        Fail List measure: BMI        Patient is due/failing the following:   BMI    Action needed:   Patient needs office visit for PREVENTATIVE CARE VISIT.    Type of outreach:    Phone, spoke to patient.  APPT 8/26 10:20    Questions for provider review:    None                                                                                                                       Katlin Harvey

## 2019-07-29 DIAGNOSIS — I10 HYPERTENSION GOAL BP (BLOOD PRESSURE) < 140/90: Chronic | ICD-10-CM

## 2019-07-29 RX ORDER — FELODIPINE 5 MG/1
TABLET, EXTENDED RELEASE ORAL
Qty: 30 TABLET | Refills: 1 | Status: SHIPPED | OUTPATIENT
Start: 2019-07-29 | End: 2019-08-27

## 2019-07-29 NOTE — TELEPHONE ENCOUNTER
"Requested Prescriptions   Pending Prescriptions Disp Refills     felodipine ER (PLENDIL) 5 MG 24 hr tablet [Pharmacy Med Name: FELODIPINE ER 5 MG TABLET]  Last Written Prescription Date:  11/06/18  Last Fill Quantity: 30,  # refills: 4   Last Office Visit with [facility], [facility] or [facility] prescribing provider:  02/28/19-Thein   Future Office Visit:    Next 5 appointments (look out 90 days)    Aug 26, 2019 10:20 AM CDT  PHYSICAL with Rosalinda Muse MD  [facility] ([facility] [address] [place] [id number] [phone]        30 tablet 4     Sig: TAKE 1 TABLET BY MOUTH EVERY DAY       Calcium Channel Blockers Protocol  Passed - 7/29/2019  9:52 AM        Passed - Blood pressure under 140/90 in past 12 months     BP Readings from Last 3 Encounters:   02/28/19 136/84   12/04/18 136/84   11/13/18 138/80                 Passed - Recent (12 mo) or future (30 days) visit within the authorizing provider's specialty     Patient had office visit in the last 12 months or has a visit in the next 30 days with authorizing provider or within the authorizing provider's specialty.  See \"Patient Info\" tab in inbasket, or \"Choose Columns\" in Meds & Orders section of the refill encounter.              Passed - Medication is active on med list        Passed - Patient is age 18 or older        Passed - No active pregnancy on record        Passed - Normal serum creatinine on file in past 12 months     Recent Labs   Lab Test 11/13/18  0922   CR 0.69             Passed - No positive pregnancy test in past 12 months          "

## 2019-08-26 ENCOUNTER — OFFICE VISIT (OUTPATIENT)
Dept: FAMILY MEDICINE | Facility: CLINIC | Age: 64
End: 2019-08-26
Payer: COMMERCIAL

## 2019-08-26 VITALS
OXYGEN SATURATION: 97 % | TEMPERATURE: 98.1 F | HEIGHT: 69 IN | BODY MASS INDEX: 32.58 KG/M2 | SYSTOLIC BLOOD PRESSURE: 136 MMHG | DIASTOLIC BLOOD PRESSURE: 80 MMHG | WEIGHT: 220 LBS | HEART RATE: 73 BPM

## 2019-08-26 DIAGNOSIS — Z13.220 LIPID SCREENING: ICD-10-CM

## 2019-08-26 DIAGNOSIS — L82.0 INFLAMED SEBORRHEIC KERATOSIS: ICD-10-CM

## 2019-08-26 DIAGNOSIS — J30.89 NON-SEASONAL ALLERGIC RHINITIS, UNSPECIFIED TRIGGER: ICD-10-CM

## 2019-08-26 DIAGNOSIS — Z91.018 FOOD ALLERGY: ICD-10-CM

## 2019-08-26 DIAGNOSIS — Z12.4 SCREENING FOR MALIGNANT NEOPLASM OF CERVIX: ICD-10-CM

## 2019-08-26 DIAGNOSIS — Z11.4 ENCOUNTER FOR SCREENING FOR HIV: ICD-10-CM

## 2019-08-26 DIAGNOSIS — E03.4 HYPOTHYROIDISM DUE TO ACQUIRED ATROPHY OF THYROID: Chronic | ICD-10-CM

## 2019-08-26 DIAGNOSIS — Z11.59 ENCOUNTER FOR HEPATITIS C SCREENING TEST FOR LOW RISK PATIENT: ICD-10-CM

## 2019-08-26 DIAGNOSIS — Z00.00 ROUTINE GENERAL MEDICAL EXAMINATION AT A HEALTH CARE FACILITY: Primary | ICD-10-CM

## 2019-08-26 DIAGNOSIS — I10 HYPERTENSION GOAL BP (BLOOD PRESSURE) < 140/90: Chronic | ICD-10-CM

## 2019-08-26 DIAGNOSIS — J45.20 MILD INTERMITTENT ASTHMA WITHOUT COMPLICATION: Chronic | ICD-10-CM

## 2019-08-26 DIAGNOSIS — M79.10 MYALGIA: ICD-10-CM

## 2019-08-26 DIAGNOSIS — Z11.59 NEED FOR HEPATITIS B SCREENING TEST: ICD-10-CM

## 2019-08-26 LAB
ALBUMIN SERPL-MCNC: 3.8 G/DL (ref 3.4–5)
ALP SERPL-CCNC: 81 U/L (ref 40–150)
ALT SERPL W P-5'-P-CCNC: 25 U/L (ref 0–50)
ANION GAP SERPL CALCULATED.3IONS-SCNC: 8 MMOL/L (ref 3–14)
AST SERPL W P-5'-P-CCNC: 21 U/L (ref 0–45)
BASOPHILS # BLD AUTO: 0 10E9/L (ref 0–0.2)
BASOPHILS NFR BLD AUTO: 0.6 %
BILIRUB SERPL-MCNC: 0.3 MG/DL (ref 0.2–1.3)
BUN SERPL-MCNC: 15 MG/DL (ref 7–30)
CALCIUM SERPL-MCNC: 8.9 MG/DL (ref 8.5–10.1)
CHLORIDE SERPL-SCNC: 104 MMOL/L (ref 94–109)
CO2 SERPL-SCNC: 28 MMOL/L (ref 20–32)
CREAT SERPL-MCNC: 0.7 MG/DL (ref 0.52–1.04)
CRP SERPL-MCNC: 5.4 MG/L (ref 0–8)
DIFFERENTIAL METHOD BLD: NORMAL
EOSINOPHIL # BLD AUTO: 0.2 10E9/L (ref 0–0.7)
EOSINOPHIL NFR BLD AUTO: 3.9 %
ERYTHROCYTE [DISTWIDTH] IN BLOOD BY AUTOMATED COUNT: 13.5 % (ref 10–15)
ERYTHROCYTE [SEDIMENTATION RATE] IN BLOOD BY WESTERGREN METHOD: 19 MM/H (ref 0–30)
GFR SERPL CREATININE-BSD FRML MDRD: >90 ML/MIN/{1.73_M2}
GLUCOSE SERPL-MCNC: 104 MG/DL (ref 70–99)
HBA1C MFR BLD: 6.1 % (ref 0–5.6)
HCT VFR BLD AUTO: 40.4 % (ref 35–47)
HGB BLD-MCNC: 13 G/DL (ref 11.7–15.7)
LYMPHOCYTES # BLD AUTO: 1.4 10E9/L (ref 0.8–5.3)
LYMPHOCYTES NFR BLD AUTO: 29.5 %
MCH RBC QN AUTO: 27.9 PG (ref 26.5–33)
MCHC RBC AUTO-ENTMCNC: 32.2 G/DL (ref 31.5–36.5)
MCV RBC AUTO: 87 FL (ref 78–100)
MONOCYTES # BLD AUTO: 0.5 10E9/L (ref 0–1.3)
MONOCYTES NFR BLD AUTO: 9.2 %
NEUTROPHILS # BLD AUTO: 2.8 10E9/L (ref 1.6–8.3)
NEUTROPHILS NFR BLD AUTO: 56.8 %
PLATELET # BLD AUTO: 301 10E9/L (ref 150–450)
POTASSIUM SERPL-SCNC: 4.2 MMOL/L (ref 3.4–5.3)
PROT SERPL-MCNC: 7.5 G/DL (ref 6.8–8.8)
RBC # BLD AUTO: 4.66 10E12/L (ref 3.8–5.2)
SODIUM SERPL-SCNC: 140 MMOL/L (ref 133–144)
TSH SERPL DL<=0.005 MIU/L-ACNC: 1.17 MU/L (ref 0.4–4)
WBC # BLD AUTO: 4.9 10E9/L (ref 4–11)

## 2019-08-26 PROCEDURE — 87389 HIV-1 AG W/HIV-1&-2 AB AG IA: CPT | Performed by: FAMILY MEDICINE

## 2019-08-26 PROCEDURE — 86803 HEPATITIS C AB TEST: CPT | Performed by: FAMILY MEDICINE

## 2019-08-26 PROCEDURE — 86140 C-REACTIVE PROTEIN: CPT | Performed by: FAMILY MEDICINE

## 2019-08-26 PROCEDURE — 86706 HEP B SURFACE ANTIBODY: CPT | Performed by: FAMILY MEDICINE

## 2019-08-26 PROCEDURE — 87340 HEPATITIS B SURFACE AG IA: CPT | Performed by: FAMILY MEDICINE

## 2019-08-26 PROCEDURE — 84443 ASSAY THYROID STIM HORMONE: CPT | Performed by: FAMILY MEDICINE

## 2019-08-26 PROCEDURE — 80053 COMPREHEN METABOLIC PANEL: CPT | Performed by: FAMILY MEDICINE

## 2019-08-26 PROCEDURE — G0145 SCR C/V CYTO,THINLAYER,RESCR: HCPCS | Performed by: FAMILY MEDICINE

## 2019-08-26 PROCEDURE — 85025 COMPLETE CBC W/AUTO DIFF WBC: CPT | Performed by: FAMILY MEDICINE

## 2019-08-26 PROCEDURE — 83036 HEMOGLOBIN GLYCOSYLATED A1C: CPT | Performed by: FAMILY MEDICINE

## 2019-08-26 PROCEDURE — 99396 PREV VISIT EST AGE 40-64: CPT | Performed by: FAMILY MEDICINE

## 2019-08-26 PROCEDURE — 36415 COLL VENOUS BLD VENIPUNCTURE: CPT | Performed by: FAMILY MEDICINE

## 2019-08-26 PROCEDURE — 85652 RBC SED RATE AUTOMATED: CPT | Performed by: FAMILY MEDICINE

## 2019-08-26 RX ORDER — EPINEPHRINE 0.3 MG/.3ML
INJECTION SUBCUTANEOUS
Qty: 0.6 ML | Refills: 1 | Status: SHIPPED | OUTPATIENT
Start: 2019-08-26 | End: 2020-11-03

## 2019-08-26 ASSESSMENT — ANXIETY QUESTIONNAIRES
2. NOT BEING ABLE TO STOP OR CONTROL WORRYING: SEVERAL DAYS
1. FEELING NERVOUS, ANXIOUS, OR ON EDGE: NOT AT ALL
5. BEING SO RESTLESS THAT IT IS HARD TO SIT STILL: NOT AT ALL
GAD7 TOTAL SCORE: 2
3. WORRYING TOO MUCH ABOUT DIFFERENT THINGS: NOT AT ALL
6. BECOMING EASILY ANNOYED OR IRRITABLE: SEVERAL DAYS
7. FEELING AFRAID AS IF SOMETHING AWFUL MIGHT HAPPEN: NOT AT ALL
IF YOU CHECKED OFF ANY PROBLEMS ON THIS QUESTIONNAIRE, HOW DIFFICULT HAVE THESE PROBLEMS MADE IT FOR YOU TO DO YOUR WORK, TAKE CARE OF THINGS AT HOME, OR GET ALONG WITH OTHER PEOPLE: NOT DIFFICULT AT ALL

## 2019-08-26 ASSESSMENT — PATIENT HEALTH QUESTIONNAIRE - PHQ9
SUM OF ALL RESPONSES TO PHQ QUESTIONS 1-9: 3
5. POOR APPETITE OR OVEREATING: NOT AT ALL

## 2019-08-26 ASSESSMENT — MIFFLIN-ST. JEOR: SCORE: 1612.29

## 2019-08-26 NOTE — PATIENT INSTRUCTIONS
Labs today  Return to clinic for fasting labs when able.    Referral to allergy for the congestion/allergy symptoms - sneezing at home.    Return to clinic for skin spot removal.      Consider follow up appointment with ortho for knee concerns   You can call [phone] to schedule this at the [facility] in [place] (formerly the [facility]).        Preventive Health Recommendations  Female Ages 50 - 64    Yearly exam: See your health care provider every year in order to  o Review health changes.   o Discuss preventive care.    o Review your medicines if your doctor has prescribed any.      Get a Pap test every three years (unless you have an abnormal result and your provider advises testing more often).    If you get Pap tests with HPV test, you only need to test every 5 years, unless you have an abnormal result.     You do not need a Pap test if your uterus was removed (hysterectomy) and you have not had cancer.    You should be tested each year for STDs (sexually transmitted diseases) if you're at risk.     Have a mammogram every 1 to 2 years.    Have a colonoscopy at age 50, or have a yearly FIT test (stool test). These exams screen for colon cancer.      Have a cholesterol test every 5 years, or more often if advised.    Have a diabetes test (fasting glucose) every three years. If you are at risk for diabetes, you should have this test more often.     If you are at risk for osteoporosis (brittle bone disease), think about having a bone density scan (DEXA).    Shots: Get a flu shot each year. Get a tetanus shot every 10 years.    Nutrition:     Eat at least 5 servings of fruits and vegetables each day.    Eat whole-grain bread, whole-wheat pasta and brown rice instead of white grains and rice.    Get adequate Calcium and Vitamin D.     Lifestyle    Exercise at least 150 minutes a week (30 minutes a day, 5 days a week). This will help you control your weight and prevent  disease.    Limit alcohol to one drink per day.    No smoking.     Wear sunscreen to prevent skin cancer.     See your dentist every six months for an exam and cleaning.    See your eye doctor every 1 to 2 years.

## 2019-08-26 NOTE — LETTER
My Asthma Action Plan    Name: Marie Kaiser   YOB: 1955  Date: 8/27/2019   My doctor: Rosalinda Muse MD   My clinic: [facility]        My Rescue Medicine:   Albuterol inhaler (Proair/Ventolin/Proventil HFA)  2-4 puffs EVERY 4 HOURS as needed. Use a spacer if recommended by your provider.   My Asthma Severity:   Intermittent / Exercise Induced  Know your asthma triggers: upper respiratory infections and pollens  upper respiratory infections  pollens          GREEN ZONE   Good Control    I feel good    No cough or wheeze    Can work, sleep and play without asthma symptoms       Take your asthma control medicine every day.     1. If exercise triggers your asthma, take your rescue medication    15 minutes before exercise or sports, and    During exercise if you have asthma symptoms  2. Spacer to use with inhaler: If you have a spacer, make sure to use it with your inhaler             YELLOW ZONE Getting Worse  I have ANY of these:    I do not feel good    Cough or wheeze    Chest feels tight    Wake up at night   1. Keep taking your Green Zone medications  2. Start taking your rescue medicine:    every 20 minutes for up to 1 hour. Then every 4 hours for 24-48 hours.  3. If you stay in the Yellow Zone for more than 12-24 hours, contact your doctor.  4. If you do not return to the Green Zone in 12-24 hours or you get worse, start taking your oral steroid medicine if prescribed by your provider.           RED ZONE Medical Alert - Get Help  I have ANY of these:    I feel awful    Medicine is not helping    Breathing getting harder    Trouble walking or talking    Nose opens wide to breathe       1. Take your rescue medicine NOW  2. If your provider has prescribed an oral steroid medicine, start taking it NOW  3. Call your doctor NOW  4. If you are still in the Red Zone after 20 minutes and you have not reached your doctor:    Take your rescue medicine again and    Call 911 or go to  the emergency room right away    See your regular doctor within 2 weeks of an Emergency Room or Urgent Care visit for follow-up treatment.          Annual Reminders:  Meet with Asthma Educator,  Flu Shot in the Fall, consider Pneumonia Vaccination for patients with asthma (aged 19 and older).    [place] [facility] [place] [id number] [place] [facility] [address] [place]                        Asthma Triggers  How To Control Things That Make Your Asthma Worse    Triggers are things that make your asthma worse.  Look at the list below to help you find your triggers and   what you can do about them. You can help prevent asthma flare-ups by staying away from your triggers.      Trigger                                                          What you can do   Cigarette Smoke  Tobacco smoke can make asthma worse. Do not allow smoking in your home, car or around you.  Be sure no one smokes at a child s day care or school.  If you smoke, ask your health care provider for ways to help you quit.  Ask family members to quit too.  Ask your health care provider for a referral to Quit Plan to help you quit smoking, or call 4-409-601-PLAN.     Colds, Flu, Bronchitis  These are common triggers of asthma. Wash your hands often.  Don t touch your eyes, nose or mouth.  Get a flu shot every year.     Dust Mites  These are tiny bugs that live in cloth or carpet. They are too small to see. Wash sheets and blankets in hot water every week.   Encase pillows and mattress in dust mite proof covers.  Avoid having carpet if you can. If you have carpet, vacuum weekly.   Use a dust mask and HEPA vacuum.   Pollen and Outdoor Mold  Some people are allergic to trees, grass, or weed pollen, or molds. Try to keep your windows closed.  Limit time out doors when pollen count is high.   Ask you health care provider about taking medicine during allergy season.     Animal Dander  Some people are allergic to skin flakes, urine or saliva from pets with fur or feathers.  Keep pets with fur or feathers out of your home.    If you can t keep the pet outdoors, then keep the pet out of your bedroom.  Keep the bedroom door closed.  Keep pets off cloth furniture and away from stuffed toys.     Mice, Rats, and Cockroaches  Some people are allergic to the waste from these pests.   Cover food and garbage.  Clean up spills and food crumbs.  Store grease in the refrigerator.   Keep food out of the bedroom.   Indoor Mold  This can be a trigger if your home has high moisture. Fix leaking faucets, pipes, or other sources of water.   Clean moldy surfaces.  Dehumidify basement if it is damp and smelly.   Smoke, Strong Odors, and Sprays  These can reduce air quality. Stay away from strong odors and sprays, such as perfume, powder, hair spray, paints, smoke incense, paint, cleaning products, candles and new carpet.   Exercise or Sports  Some people with asthma have this trigger. Be active!  Ask your doctor about taking medicine before sports or exercise to prevent symptoms.    Warm up for 5-10 minutes before and after sports or exercise.     Other Triggers of Asthma  Cold air:  Cover your nose and mouth with a scarf.  Sometimes laughing or crying can be a trigger.  Some medicines and food can trigger asthma.

## 2019-08-26 NOTE — PROGRESS NOTES
SUBJECTIVE:   CC: Marie Kaiser is an 63 year old woman who presents for preventive health visit.     Healthy Habits:    Do you get at least three servings of calcium containing foods daily (dairy, green leafy vegetables, etc.)? 1-2 servings     Amount of exercise or daily activities, outside of work: Active job     Problems taking medications regularly Yes unsure maybe side effects     Medication side effects: No    Have you had an eye exam in the past two years? yes    Do you see a dentist twice per year? no    Do you have sleep apnea, excessive snoring or daytime drowsiness?no      Hypertension Follow-up      Do you check your blood pressure regularly outside of the clinic? Yes     Are you following a low salt diet? No    Are your blood pressures ever more than 140 on the top number (systolic) OR more   than 90 on the bottom number (diastolic), for example 140/90? Yes  -  140-150/80's      Hypothyroidism Follow-up      Since last visit, patient describes the following symptoms: Weight stable, no hair loss, no skin changes, no constipation, no loose stools    Asthma Follow-Up    Was ACT completed today?    Yes    ACT Total Scores 8/26/2019   ACT TOTAL SCORE -   ASTHMA ER VISITS -   ASTHMA HOSPITALIZATIONS -   ACT TOTAL SCORE (Goal Greater than or Equal to 20) 24   In the past 12 months, how many times did you visit the emergency room for your asthma without being admitted to the hospital? 0   In the past 12 months, how many times were you hospitalized overnight because of your asthma? 0       How many days per week do you miss taking your asthma controller medication?  I do not have an asthma controller medication    Please describe any recent triggers for your asthma: upper respiratory infections and pollens    Have you had any Emergency Room Visits, Urgent Care Visits, or Hospital Admissions since your last office visit?  No      Allergies  Uses antihistamines intermittently.  Has not needed albuterol  recently.        Today's PHQ-2 Score:   PHQ-2 ( 1999 Pf[employer]) 8/26/2019 5/14/2018   Q1: Little interest or pleasure in doing things 0 0   Q2: Feeling down, depressed or hopeless 0 0   PHQ-2 Score 0 0   Q1: Little interest or pleasure in doing things - -   Q2: Feeling down, depressed or hopeless - -   PHQ-2 Score - -       Abuse: Current or Past(Physical, Sexual or Emotional)- No  Do you feel safe in your environment? Yes    Social History     Tobacco Use     Smoking status: Never Smoker     Smokeless tobacco: Never Used   Substance Use Topics     Alcohol use: No     If you drink alcohol do you typically have >3 drinks per day or >7 drinks per week? No                     Reviewed orders with patient.  Reviewed health maintenance and updated orders accordingly - Yes  BP Readings from Last 3 Encounters:   08/26/19 136/80   02/28/19 136/84   12/04/18 136/84    Wt Readings from Last 3 Encounters:   08/26/19 99.8 kg (220 lb)   02/28/19 104.4 kg (230 lb 3.2 oz)   11/06/18 103.9 kg (229 lb)                    Mammogram Screening: Patient over age 50, mutual decision to screen reflected in health maintenance.  Patient declined    Pertinent mammograms are reviewed under the imaging tab.  History of abnormal Pap smear: NO - age 30-65 PAP every 5 years with negative HPV co-testing recommended  PAP / HPV 11/16/2015 2/15/2011 9/30/2008   PAP NIL NIL NIL     Reviewed and updated as needed this visit by clinical staff  Tobacco  Allergies  Meds  Med Hx  Surg Hx  Fam Hx  Soc Hx        Reviewed and updated as needed this visit by Provider  Tobacco  Allergies  Meds  Med Hx  Surg Hx  Fam Hx  Soc Hx       Past Medical History:   Diagnosis Date     Abnormal Papanicolaou smear of cervix and cervical HPV      Allergic rhinitis due to other allergen      Backache, unspecified      Endometrial hyperplasia      Esophageal reflux      Female stress incontinence      Headache(784.0)      Intervertebral lumbar disc disorder with  "myelopathy, lumbar region      Irritable bowel syndrome      Lump or mass in breast      Myalgia and myositis, unspecified      Palpitations 5/26/2009     Panic disorder without agoraphobia      Symptomatic menopausal or female climacteric states      Viral warts, unspecified       Past Surgical History:   Procedure Laterality Date     CARPAL TUNNEL RELEASE RT/LT      bilaterally     COLONOSCOPY  8/8/2011    Procedure:COLONOSCOPY; Surgeon:KEISHA DAVIS; Location:[facility] OR     HC DILATION/CURETTAGE DIAG/THER NON OB      Dr. Sanchez       ROS:  10 point ROS of systems including Constitutional, Eyes, Respiratory, Cardiovascular, Gastroenterology, Genitourinary, Integumentary, Muscularskeletal, Psychiatric were all negative except for pertinent positives noted in my HPI and below    Floaters in eyes over last few months.  Saw eye doctor - in August.  Follow up planned in next week.    Nasal congestion - worse after certain foods.  Uncertain what she is allergic to.  Swelling in muscle areas with exercise/activity.    OBJECTIVE:   /80 (BP Location: Right arm, Patient Position: Chair, Cuff Size: Adult Large)   Pulse 73   Temp 98.1  F (36.7  C) (Oral)   Ht 1.753 m (5' 9\")   Wt 99.8 kg (220 lb)   LMP  (LMP Unknown)   SpO2 97%   Breastfeeding? No   BMI 32.49 kg/m    EXAM:  GENERAL APPEARANCE: alert, no distress and obese  EYES: Eyes grossly normal to inspection, PERRL and conjunctivae and sclerae normal  HENT: ear canals and TM's normal, nose and mouth without ulcers or lesions, oropharynx clear and oral mucous membranes moist.  Edema nasal mucosa bilaterally. No active drainage.  NECK: no adenopathy, no asymmetry, masses, or scars and thyroid normal to palpation  RESP: lungs clear to auscultation - no rales, rhonchi or wheezes  BREAST: normal without masses, tenderness or nipple discharge and no palpable axillary masses or adenopathy  CV: regular rate and rhythm, normal S1 S2, no S3 or S4, no murmur, click " or rub, no peripheral edema and peripheral pulses strong  ABDOMEN: soft, nontender, no hepatosplenomegaly, no masses and bowel sounds normal   (female): normal female external genitalia, normal urethral meatus, vaginal mucosal atrophy noted, normal cervix, adnexae, and uterus without masses or abnormal discharge  MS: no musculoskeletal defects are noted and gait is age appropriate without ataxia  SKIN: no suspicious lesions or rashes and beneath breasts noted to have SK lesions, skin tags neck.   NEURO: Normal strength and tone, sensory exam grossly normal, mentation intact and speech normal  PSYCH: mentation appears normal and affect normal/bright    Diagnostic Test Results:  Labs reviewed in Epic  Results for orders placed or performed in visit on 08/26/19 (from the past 24 hour(s))   Comprehensive metabolic panel (BMP + Alb, Alk Phos, ALT, AST, Total. Bili, TP)   Result Value Ref Range    Sodium 140 133 - 144 mmol/L    Potassium 4.2 3.4 - 5.3 mmol/L    Chloride 104 94 - 109 mmol/L    Carbon Dioxide 28 20 - 32 mmol/L    Anion Gap 8 3 - 14 mmol/L    Glucose 104 (H) 70 - 99 mg/dL    Urea Nitrogen 15 7 - 30 mg/dL    Creatinine 0.70 0.52 - 1.04 mg/dL    GFR Estimate >90 >60 mL/min/[1.73_m2]    GFR Estimate If Black >90 >60 mL/min/[1.73_m2]    Calcium 8.9 8.5 - 10.1 mg/dL    Bilirubin Total 0.3 0.2 - 1.3 mg/dL    Albumin 3.8 3.4 - 5.0 g/dL    Protein Total 7.5 6.8 - 8.8 g/dL    Alkaline Phosphatase 81 40 - 150 U/L    ALT 25 0 - 50 U/L    AST 21 0 - 45 U/L   Hemoglobin A1c   Result Value Ref Range    Hemoglobin A1C 6.1 (H) 0 - 5.6 %   CBC with platelets and differential   Result Value Ref Range    WBC 4.9 4.0 - 11.0 10e9/L    RBC Count 4.66 3.8 - 5.2 10e12/L    Hemoglobin 13.0 11.7 - 15.7 g/dL    Hematocrit 40.4 35.0 - 47.0 %    MCV 87 78 - 100 fl    MCH 27.9 26.5 - 33.0 pg    MCHC 32.2 31.5 - 36.5 g/dL    RDW 13.5 10.0 - 15.0 %    Platelet Count 301 150 - 450 10e9/L    % Neutrophils 56.8 %    % Lymphocytes 29.5 %     % Monocytes 9.2 %    % Eosinophils 3.9 %    % Basophils 0.6 %    Absolute Neutrophil 2.8 1.6 - 8.3 10e9/L    Absolute Lymphocytes 1.4 0.8 - 5.3 10e9/L    Absolute Monocytes 0.5 0.0 - 1.3 10e9/L    Absolute Eosinophils 0.2 0.0 - 0.7 10e9/L    Absolute Basophils 0.0 0.0 - 0.2 10e9/L    Diff Method Automated Method    ESR: Erythrocyte sedimentation rate   Result Value Ref Range    Sed Rate 19 0 - 30 mm/h   CRP, inflammation   Result Value Ref Range    CRP Inflammation 5.4 0.0 - 8.0 mg/L   TSH with free T4 reflex   Result Value Ref Range    TSH 1.17 0.40 - 4.00 mU/L       ASSESSMENT/PLAN:   1. Routine general medical examination at a health care facility  Fasting.  Screenings updated.    - Comprehensive metabolic panel (BMP + Alb, Alk Phos, ALT, AST, Total. Bili, TP)  - Hemoglobin A1c  - CBC with platelets and differential    2. Food allergy  Refill for prn use.   - EPINEPHrine (EPIPEN/ADRENACLICK/OR ANY BX GENERIC EQUIV) 0.3 MG/0.3ML injection 2-pack; INJECT 0.3 MLS (0.3 MG) INTO THE MUSCLE ONCE AS NEEDED FOR ANAPHYLAXIS  Dispense: 0.6 mL; Refill: 1    3. Screening for malignant neoplasm of cervix  - HPV High Risk Types DNA Cervical  - Pap imaged thin layer screen with HPV - recommended age 30 - 65 years (select HPV order below)    4. Encounter for hepatitis C screening test for low risk patient  - Hepatitis C Screen Reflex to HCV RNA Quant and Genotype    5. Need for hepatitis B screening test  - Hepatitis B Surface Antibody  - Hepatitis B surface antigen    6. Encounter for screening for HIV  - HIV Screening    7. Non-seasonal allergic rhinitis, unspecified trigger  ?food allergy or other environmental cause.  Desires allergy evaluation.   - ALLERGY/ASTHMA ADULT REFERRAL    8. Myalgia  No sign inflammatory response.  Known knee pain related to OA - discussed possible repeat xray vs follow up with Ortho.  Will plan follow up with ortho in future.   - ESR: Erythrocyte sedimentation rate  - CRP, inflammation    9.  "Hypothyroidism due to acquired atrophy of thyroid  euthyroid  - TSH with free T4 reflex  - thyroid (ARMOUR THYROID) 60 MG tablet; Take 1 tablet (60 mg) by mouth daily  Dispense: 90 tablet; Refill: 3    10. Hypertension goal BP (blood pressure) < 140/90  Controlled.    - felodipine ER (PLENDIL) 5 MG 24 hr tablet; Take 1 tablet (5 mg) by mouth daily  Dispense: 90 tablet; Refill: 1    11. Inflamed seborrheic keratosis  Chest wall - return to clinic for removal if desired.    12.  Asthma intermittent  Has albuterol available.  Stable.        COUNSELING:   Reviewed preventive health counseling, as reflected in patient instructions       Regular exercise       Healthy diet/nutrition       Vision screening       Osteoporosis Prevention/Bone Health       Colon cancer screening       Consider Hep C screening for patients born between 1945 and 1965       HIV screeninx in teen years, 1x in adult years, and at intervals if high risk       (Cheri)menopause management    Estimated body mass index is 32.49 kg/m  as calculated from the following:    Height as of this encounter: 1.753 m (5' 9\").    Weight as of this encounter: 99.8 kg (220 lb).    Weight management plan: Discussed healthy diet and exercise guidelines     reports that she has never smoked. She has never used smokeless tobacco.      Counseling Resources:  ATP IV Guidelines  Pooled Cohorts Equation Calculator  Breast Cancer Risk Calculator  FRAX Risk Assessment  ICSI Preventive Guidelines  Dietary Guidelines for Americans, 2010  USDA's MyPlate  ASA Prophylaxis  Lung CA Screening    Rosalinda Muse MD  [facility]    Patient Instructions   Labs today  Return to clinic for fasting labs when able.    Referral to allergy for the congestion/allergy symptoms - sneezing at home.    Return to clinic for skin spot removal.      Consider follow up appointment with ortho for knee concerns   You can call [phone] to schedule this at the [facility] " building in [place] (formerly the [facility]).

## 2019-08-26 NOTE — LETTER
September 4, 2019    Marie Delaneyyanni  [address] [place] [phone]    Dear ,  This letter is regarding your recent Pap smear (cervical cancer screening) and Human Papillomavirus (HPV) test.  We are happy to inform you that your Pap smear result is normal. Cervical cancer is closely linked with certain types of HPV. Your results showed no evidence of high-risk HPV.  We recommend you have your next PAP smear and HPV test in 5 years.  You will still need to return to the clinic every year for an annual exam and other preventive tests.  If you have additional questions regarding this result, please call our registered nurse, Desi at [phone].  Sincerely,    Rosalinda Muse MD/aramis

## 2019-08-27 ENCOUNTER — TELEPHONE (OUTPATIENT)
Dept: FAMILY MEDICINE | Facility: CLINIC | Age: 64
End: 2019-08-27

## 2019-08-27 LAB
HBV SURFACE AB SERPL IA-ACNC: 0.24 M[IU]/ML
HBV SURFACE AG SERPL QL IA: NONREACTIVE
HCV AB SERPL QL IA: NONREACTIVE
HIV 1+2 AB+HIV1 P24 AG SERPL QL IA: NONREACTIVE

## 2019-08-27 PROCEDURE — 87624 HPV HI-RISK TYP POOLED RSLT: CPT | Performed by: FAMILY MEDICINE

## 2019-08-27 RX ORDER — FELODIPINE 5 MG/1
5 TABLET, EXTENDED RELEASE ORAL DAILY
Qty: 90 TABLET | Refills: 1 | Status: SHIPPED | OUTPATIENT
Start: 2019-08-27 | End: 2020-01-10

## 2019-08-27 RX ORDER — THYROID 60 MG/1
60 TABLET ORAL DAILY
Qty: 90 TABLET | Refills: 3 | Status: SHIPPED | OUTPATIENT
Start: 2019-08-27 | End: 2020-09-09

## 2019-08-27 ASSESSMENT — ANXIETY QUESTIONNAIRES: GAD7 TOTAL SCORE: 2

## 2019-08-27 ASSESSMENT — ASTHMA QUESTIONNAIRES: ACT_TOTALSCORE: 24

## 2019-08-27 NOTE — RESULT ENCOUNTER NOTE
"Your thyroid testing indicates you are on the correct dosage of medication.  The goal TSH is between 1 and 2 and you are at 1.17.  I am sending refills of your medication.  Your blood sugar is borderline elevated and the long term blood sugar testing is similar to previous.  This is in the \"prediabetic\" range.  Exercise and limiting carbohydrate and sugars in diet can be helpful at preventing progression to diabetes.  Inflammation testing is normal.  Follow up with Dr. Douglas for the knee pain is recommended as we discussed.  Please call or MyChart message me if you have any questions.   PSK"

## 2019-08-27 NOTE — RESULT ENCOUNTER NOTE
Your HIV screening is negative/normal.  The Hepatitis C and Hepatitis B testing are also negative/normal.  Please call or MyChart message me if you have any questions.    PSK

## 2019-08-30 LAB
FINAL DIAGNOSIS: NORMAL
HPV HR 12 DNA CVX QL NAA+PROBE: NEGATIVE
HPV16 DNA SPEC QL NAA+PROBE: NEGATIVE
HPV18 DNA SPEC QL NAA+PROBE: NEGATIVE
SPECIMEN DESCRIPTION: NORMAL
SPECIMEN SOURCE CVX/VAG CYTO: NORMAL

## 2019-09-03 LAB
COPATH REPORT: NORMAL
PAP: NORMAL

## 2019-09-28 ENCOUNTER — HEALTH MAINTENANCE LETTER (OUTPATIENT)
Age: 64
End: 2019-09-28

## 2019-10-09 ENCOUNTER — MYC MEDICAL ADVICE (OUTPATIENT)
Dept: FAMILY MEDICINE | Facility: CLINIC | Age: 64
End: 2019-10-09

## 2019-10-09 NOTE — LETTER
October 9, 2019      Marie Kaiser  [address] [place] [phone]        Dear Marie,       Attached with this letter is the Advanced Care Directive forms that you have requested.         Sincerely,  [facility]

## 2019-10-09 NOTE — TELEPHONE ENCOUNTER
Team-  Patient would like to have advance care directive forms sent to her. Please mail to home address.       Spoke with patient on the phone to gain understanding of her My Chart message.     She was wondering if when she needs an appointment for both lab and injections. Told her yes. She wanted to know if she could be seen back to back right away and told her that if they are able to see her without lengthy wait between apts they will try to accommodate her.     She was wanting to know about advance care directives . Told her that she can fill them out have 2 witnesses sign and have it notarized, return to clinic and it will be updated and put it into her chart or she can have provider sign it as witness. Told her that there is a free class offered as well but she was not interested in that. She would like advance care directive forms sent to her and then she will return to clinic.    Vonda Velasquez RN

## 2019-10-14 DIAGNOSIS — Z13.220 LIPID SCREENING: ICD-10-CM

## 2019-10-14 LAB
CHOLEST SERPL-MCNC: 247 MG/DL
HDLC SERPL-MCNC: 66 MG/DL
LDLC SERPL CALC-MCNC: 160 MG/DL
NONHDLC SERPL-MCNC: 181 MG/DL
TRIGL SERPL-MCNC: 106 MG/DL

## 2019-10-14 PROCEDURE — 36415 COLL VENOUS BLD VENIPUNCTURE: CPT | Performed by: FAMILY MEDICINE

## 2019-10-14 PROCEDURE — 80061 LIPID PANEL: CPT | Performed by: FAMILY MEDICINE

## 2019-10-25 ENCOUNTER — OFFICE VISIT (OUTPATIENT)
Dept: FAMILY MEDICINE | Facility: CLINIC | Age: 64
End: 2019-10-25
Payer: COMMERCIAL

## 2019-10-25 VITALS
SYSTOLIC BLOOD PRESSURE: 133 MMHG | WEIGHT: 214 LBS | OXYGEN SATURATION: 97 % | BODY MASS INDEX: 31.7 KG/M2 | HEART RATE: 84 BPM | HEIGHT: 69 IN | TEMPERATURE: 97.6 F | RESPIRATION RATE: 19 BRPM | DIASTOLIC BLOOD PRESSURE: 83 MMHG

## 2019-10-25 DIAGNOSIS — R10.31 ABDOMINAL PAIN, RIGHT LOWER QUADRANT: Primary | ICD-10-CM

## 2019-10-25 DIAGNOSIS — R82.90 NONSPECIFIC FINDING ON EXAMINATION OF URINE: ICD-10-CM

## 2019-10-25 LAB
ALBUMIN UR-MCNC: NEGATIVE MG/DL
APPEARANCE UR: CLEAR
BACTERIA #/AREA URNS HPF: ABNORMAL /HPF
BASOPHILS # BLD AUTO: 0 10E9/L (ref 0–0.2)
BASOPHILS NFR BLD AUTO: 0.4 %
BILIRUB UR QL STRIP: NEGATIVE
COLOR UR AUTO: YELLOW
DIFFERENTIAL METHOD BLD: NORMAL
EOSINOPHIL # BLD AUTO: 0.1 10E9/L (ref 0–0.7)
EOSINOPHIL NFR BLD AUTO: 1.9 %
ERYTHROCYTE [DISTWIDTH] IN BLOOD BY AUTOMATED COUNT: 13 % (ref 10–15)
GLUCOSE UR STRIP-MCNC: NEGATIVE MG/DL
HCT VFR BLD AUTO: 39 % (ref 35–47)
HGB BLD-MCNC: 12.5 G/DL (ref 11.7–15.7)
HGB UR QL STRIP: NEGATIVE
KETONES UR STRIP-MCNC: NEGATIVE MG/DL
LEUKOCYTE ESTERASE UR QL STRIP: ABNORMAL
LYMPHOCYTES # BLD AUTO: 1.7 10E9/L (ref 0.8–5.3)
LYMPHOCYTES NFR BLD AUTO: 24.9 %
MCH RBC QN AUTO: 27.9 PG (ref 26.5–33)
MCHC RBC AUTO-ENTMCNC: 32.1 G/DL (ref 31.5–36.5)
MCV RBC AUTO: 87 FL (ref 78–100)
MONOCYTES # BLD AUTO: 0.6 10E9/L (ref 0–1.3)
MONOCYTES NFR BLD AUTO: 8.5 %
MUCOUS THREADS #/AREA URNS LPF: PRESENT /LPF
NEUTROPHILS # BLD AUTO: 4.4 10E9/L (ref 1.6–8.3)
NEUTROPHILS NFR BLD AUTO: 64.3 %
NITRATE UR QL: NEGATIVE
NON-SQ EPI CELLS #/AREA URNS LPF: ABNORMAL /LPF
PH UR STRIP: 5.5 PH (ref 5–7)
PLATELET # BLD AUTO: 304 10E9/L (ref 150–450)
RBC # BLD AUTO: 4.48 10E12/L (ref 3.8–5.2)
RBC #/AREA URNS AUTO: ABNORMAL /HPF
SOURCE: ABNORMAL
SP GR UR STRIP: 1.01 (ref 1–1.03)
TRANS CELLS #/AREA URNS HPF: ABNORMAL /HPF
UROBILINOGEN UR STRIP-ACNC: 0.2 EU/DL (ref 0.2–1)
WBC # BLD AUTO: 6.8 10E9/L (ref 4–11)
WBC #/AREA URNS AUTO: ABNORMAL /HPF

## 2019-10-25 PROCEDURE — 36415 COLL VENOUS BLD VENIPUNCTURE: CPT | Performed by: PHYSICIAN ASSISTANT

## 2019-10-25 PROCEDURE — 81001 URINALYSIS AUTO W/SCOPE: CPT | Performed by: PHYSICIAN ASSISTANT

## 2019-10-25 PROCEDURE — 99214 OFFICE O/P EST MOD 30 MIN: CPT | Performed by: PHYSICIAN ASSISTANT

## 2019-10-25 PROCEDURE — 87086 URINE CULTURE/COLONY COUNT: CPT | Performed by: PHYSICIAN ASSISTANT

## 2019-10-25 PROCEDURE — 85025 COMPLETE CBC W/AUTO DIFF WBC: CPT | Performed by: PHYSICIAN ASSISTANT

## 2019-10-25 RX ORDER — METRONIDAZOLE 500 MG/1
500 TABLET ORAL 4 TIMES DAILY
Qty: 40 TABLET | Refills: 0 | Status: CANCELLED | OUTPATIENT
Start: 2019-10-25 | End: 2019-11-04

## 2019-10-25 RX ORDER — SULFAMETHOXAZOLE/TRIMETHOPRIM 800-160 MG
1 TABLET ORAL 2 TIMES DAILY
Qty: 20 TABLET | Refills: 0 | Status: CANCELLED | OUTPATIENT
Start: 2019-10-25 | End: 2019-11-04

## 2019-10-25 ASSESSMENT — MIFFLIN-ST. JEOR: SCORE: 1580.08

## 2019-10-25 ASSESSMENT — PAIN SCALES - GENERAL: PAINLEVEL: EXTREME PAIN (8)

## 2019-10-25 NOTE — PATIENT INSTRUCTIONS
Take augmentin twice a day for 10 days  Go to emergency department for any change in symptoms- increased pain, nausea, vomiting, fever or other change in symptoms.   Follow up with us Monday if symptoms not improving

## 2019-10-25 NOTE — PROGRESS NOTES
"Subjective     Marie Kaiser is a 64 year old female who presents to clinic today for the following health issues:    HPI   ABDOMINAL   PAIN     Onset: last night    Description:   Character: Sharp  Location: right lower quadrant  Radiation: Back    Intensity: 8/10    Progression of Symptoms:  intermittent    Accompanying Signs & Symptoms:  Fever/Chills?: no   Gas/Bloating: YES  Nausea: no   Vomitting: no   Diarrhea?: YES  Constipation:no   Dysuria or Hematuria: no    History:   Trauma: no   Previous similar pain: YES- diverticulitis   Previous tests done: Colonoscopy, CT Scan    Precipitating factors:   Does the pain change with:     Food: YES     BM: YES    Urination: YES    Alleviating factors:      Therapies Tried and outcome: none    LMP:  not applicable     Complains of recurrent bout of diverticulitis.   Reports that she has had this so many times she knows exactly what it feels like.  Last had CT over a year ago.  Reports that she has been \"In and out of hospital and follows particular role\". - started yesterday-night before had gone out to eat and had dinner with wheat in it.  Reports allergy to wheat.  \"Not had wheat no cases of diverticulitis\".  Wheat in diet and giving me gas  Drink ton of water and eat good so contribute it to wheat  No diarrhea. No blood in stool  Normal bowel movements but many of them  No urinary tract infection symptoms     Patient Active Problem List   Diagnosis     Intermittent asthma     Major depressive disorder, recurrent episode, in full remission (HCC)     Hypothyroidism     Impaired fasting glucose     Hyperlipidemia LDL goal <130     Essential hypertension with goal blood pressure less than 140/90     Advanced directives, counseling/discussion     History of diverticulitis of colon     A-fib (H)     Obesity, Class I, BMI 30-34.9     DONA (obstructive sleep apnea)- mild (AHI 5)     Gastroesophageal reflux disease without esophagitis     Past Surgical History: "   Procedure Laterality Date     CARPAL TUNNEL RELEASE RT/LT      bilaterally     COLONOSCOPY  8/8/2011    Procedure:COLONOSCOPY; Surgeon:KEISHA DAVIS; Location:[facility]     HC DILATION/CURETTAGE DIAG/THER NON OB      Dr. Sanchez       Social History     Tobacco Use     Smoking status: Never Smoker     Smokeless tobacco: Never Used   Substance Use Topics     Alcohol use: No     Family History   Problem Relation Age of Onset     Asthma Father      Gastrointestinal Disease Father         hiatal hernia/acid reflux     Heart Disease Brother         Afib     Asthma Brother      Respiratory Brother         sleep apnea     Diabetes Brother      Hypertension Brother      Depression Brother      Respiratory Sister         sleep apnea     Hypertension Sister      Thyroid Disease Sister      Obesity Sister      Depression Sister      Diabetes Sister      Alzheimer Disease Mother         advanced dementia     Allergies Daughter         cats     Diabetes Other         neice and nephew     C.A.D. No family hx of      Breast Cancer No family hx of      Cancer - colorectal No family hx of      Prostate Cancer No family hx of      Cancer No family hx of          Current Outpatient Medications   Medication Sig Dispense Refill     albuterol (PROAIR HFA/PROVENTIL HFA/VENTOLIN HFA) 108 (90 Base) MCG/ACT inhaler Inhale 2 puffs into the lungs every 6 hours as needed for shortness of breath / dyspnea or wheezing 1 Inhaler 1            aspirin 325 MG tablet Take 81 mg by mouth        EPINEPHrine (EPIPEN/ADRENACLICK/OR ANY BX GENERIC EQUIV) 0.3 MG/0.3ML injection 2-pack INJECT 0.3 MLS (0.3 MG) INTO THE MUSCLE ONCE AS NEEDED FOR ANAPHYLAXIS 0.6 mL 1     felodipine ER (PLENDIL) 5 MG 24 hr tablet Take 1 tablet (5 mg) by mouth daily 90 tablet 1     fexofenadine (ALLEGRA) 180 MG tablet Take 1 tablet (180 mg) by mouth daily 30 tablet 1     order for DME Equipment ordered: Respironics Auto PAP Mask type: Nasal Settings: 5-15 cm       thyroid  "(ARMOUR THYROID) 60 MG tablet Take 1 tablet (60 mg) by mouth daily 90 tablet 3     BP Readings from Last 3 Encounters:   10/25/19 133/83   08/26/19 136/80   02/28/19 136/84    Wt Readings from Last 3 Encounters:   10/25/19 97.1 kg (214 lb)   08/26/19 99.8 kg (220 lb)   02/28/19 104.4 kg (230 lb 3.2 oz)                      Reviewed and updated as needed this visit by Provider  Tobacco  Allergies  Meds  Problems  Med Hx  Surg Hx  Fam Hx  Soc Hx          Review of Systems   ROS COMP: Constitutional, HEENT, cardiovascular, pulmonary, gi and gu systems are negative, except as otherwise noted.      Objective    /83 (BP Location: Right arm, Patient Position: Chair, Cuff Size: Adult Large)   Pulse 84   Temp 97.6  F (36.4  C) (Oral)   Resp 19   Ht 1.753 m (5' 9\")   Wt 97.1 kg (214 lb)   LMP  (Exact Date)   SpO2 97%   Breastfeeding? No   BMI 31.60 kg/m    Body mass index is 31.6 kg/m .  Physical Exam   GENERAL: alert, no distress and obese  NECK: no adenopathy, no asymmetry, masses, or scars and thyroid normal to palpation  RESP: lungs clear to auscultation - no rales, rhonchi or wheezes  CV: regular rate and rhythm, normal S1 S2, no S3 or S4, no murmur, click or rub, no peripheral edema and peripheral pulses strong  ABDOMEN: tenderness RLQ, no organomegaly or masses and bowel sounds normal, no rebound or guarding  No CVA tenderness   MS: no gross musculoskeletal defects noted, no edema    Diagnostic Test Results:  Results for orders placed or performed in visit on 10/25/19   CBC with platelets differential   Result Value Ref Range    WBC 6.8 4.0 - 11.0 10e9/L    RBC Count 4.48 3.8 - 5.2 10e12/L    Hemoglobin 12.5 11.7 - 15.7 g/dL    Hematocrit 39.0 35.0 - 47.0 %    MCV 87 78 - 100 fl    MCH 27.9 26.5 - 33.0 pg    MCHC 32.1 31.5 - 36.5 g/dL    RDW 13.0 10.0 - 15.0 %    Platelet Count 304 150 - 450 10e9/L    % Neutrophils 64.3 %    % Lymphocytes 24.9 %    % Monocytes 8.5 %    % Eosinophils 1.9 %    % " Basophils 0.4 %    Absolute Neutrophil 4.4 1.6 - 8.3 10e9/L    Absolute Lymphocytes 1.7 0.8 - 5.3 10e9/L    Absolute Monocytes 0.6 0.0 - 1.3 10e9/L    Absolute Eosinophils 0.1 0.0 - 0.7 10e9/L    Absolute Basophils 0.0 0.0 - 0.2 10e9/L    Diff Method Automated Method    *UA reflex to Microscopic and Culture ([place] and [facility] (except [place] and [place])   Result Value Ref Range    Color Urine Yellow     Appearance Urine Clear     Glucose Urine Negative NEG^Negative mg/dL    Bilirubin Urine Negative NEG^Negative    Ketones Urine Negative NEG^Negative mg/dL    Specific Gravity Urine 1.015 1.003 - 1.035    Blood Urine Negative NEG^Negative    pH Urine 5.5 5.0 - 7.0 pH    Protein Albumin Urine Negative NEG^Negative mg/dL    Urobilinogen Urine 0.2 0.2 - 1.0 EU/dL    Nitrite Urine Negative NEG^Negative    Leukocyte Esterase Urine Large (A) NEG^Negative    Source Midstream Urine    Urine Microscopic   Result Value Ref Range    WBC Urine 25-50 (A) OTO5^0 - 5 /HPF    RBC Urine O - 2 OTO2^O - 2 /HPF    Squamous Epithelial /LPF Urine Moderate (A) FEW^Few /LPF    Transitional Epi Few FEW^Few /HPF    Bacteria Urine Few (A) NEG^Negative /HPF    Mucous Urine Present (A) NEG^Negative /LPF   Urine Culture Aerobic Bacterial   Result Value Ref Range    Specimen Description Midstream Urine     Culture Micro       10,000 to 50,000 colonies/mL  mixed urogenital vicki             Assessment & Plan     1. Abdominal pain, right lower quadrant  She reports that symptoms are classic for her diverticulitis.  Reports she cannot take cipro and/or flagyl and typically treated with augmentin.  Normal cbc - car ride her was not painful and no rebound tenderness, nothing on exam to suggest appendicitis and given normal cbc unlikely abscess but warning signs and symptoms warranting urgent evaluation reviewed.   Had abnormal urinalysis so cultured urine but no urinary tract infection symptoms   - CBC with platelets differential  - *UA  reflex to Microscopic and Culture ([place] and [facility] (except [place] and [place])  - amoxicillin-clavulanate (AUGMENTIN) 875-125 MG tablet; Take 1 tablet by mouth 2 times daily for 10 days  Dispense: 20 tablet; Refill: 0  - Urine Microscopic    2. Nonspecific finding on examination of urine  Normal urine culture   - Urine Culture Aerobic Bacterial             Patient Instructions   Take augmentin twice a day for 10 days  Go to emergency department for any change in symptoms- increased pain, nausea, vomiting, fever or other change in symptoms.   Follow up with us Monday if symptoms not improving       Return in about 3 days (around 10/28/2019), or if symptoms worsen or fail to improve.    aYnni Moura PA-C  [facility]

## 2019-10-26 LAB
BACTERIA SPEC CULT: NORMAL
SPECIMEN SOURCE: NORMAL

## 2019-10-27 ENCOUNTER — HEALTH MAINTENANCE LETTER (OUTPATIENT)
Age: 64
End: 2019-10-27

## 2019-10-27 NOTE — RESULT ENCOUNTER NOTE
Candelario Salazar  Your urine culture does not show infection.   I hope you are feeling better.   Please call or MyChart my office with any questions or concerns.    Yanni Moura, PAC

## 2020-01-09 DIAGNOSIS — I10 HYPERTENSION GOAL BP (BLOOD PRESSURE) < 140/90: Chronic | ICD-10-CM

## 2020-01-09 NOTE — TELEPHONE ENCOUNTER
"Requested Prescriptions   Pending Prescriptions Disp Refills     felodipine ER (PLENDIL) 5 MG 24 hr tablet [Pharmacy Med Name: FELODIPINE ER 5 MG TABLET] 30 tablet 5     Sig: TAKE 1 TABLET BY MOUTH EVERY DAY       Calcium Channel Blockers Protocol  Passed - 1/9/2020  2:08 PM        Passed - Blood pressure under 140/90 in past 12 months     BP Readings from Last 3 Encounters:   10/25/19 133/83   08/26/19 136/80   02/28/19 136/84                 Passed - Recent (12 mo) or future (30 days) visit within the authorizing provider's specialty     Patient has had an office visit with the authorizing provider or a provider within the authorizing providers department within the previous 12 mos or has a future within next 30 days. See \"Patient Info\" tab in inbasket, or \"Choose Columns\" in Meds & Orders section of the refill encounter.              Passed - Medication is active on med list        Passed - Patient is age 18 or older        Passed - No active pregnancy on record        Passed - Normal serum creatinine on file in past 12 months     Recent Labs   Lab Test 08/26/19  1142   CR 0.70             Passed - No positive pregnancy test in past 12 months        felodipine ER (PLENDIL) 5 MG 24 hr tablet  Last Written Prescription Date:  8/27/19  Last Fill Quantity: 90,  # refills: 1   Last office visit: 10/25/2019 with prescribing provider:  Dr. Muse   Future Office Visit:      "

## 2020-01-10 RX ORDER — FELODIPINE 5 MG/1
TABLET, EXTENDED RELEASE ORAL
Qty: 30 TABLET | Refills: 5 | Status: SHIPPED | OUTPATIENT
Start: 2020-01-10 | End: 2020-08-05

## 2020-01-10 NOTE — TELEPHONE ENCOUNTER
Prescription approved per [facility] Refill Protocol.    Caroline Huynh RN, [facility]

## 2020-02-10 NOTE — TELEPHONE ENCOUNTER
"Requested Prescriptions   Pending Prescriptions Disp Refills     ARMOUR THYROID 60 MG tablet [Pharmacy Med Name: ARMOUR THYROID 60 MG TABLET] 30 tablet 1     Sig: TAKE 1 TABLET (60 MG) BY MOUTH DAILY    Thyroid Protocol Passed    7/14/2018 12:05 PM       Passed - Patient is 12 years or older       Passed - Recent (12 mo) or future (30 days) visit within the authorizing provider's specialty    Patient had office visit in the last 12 months or has a visit in the next 30 days with authorizing provider or within the authorizing provider's specialty.  See \"Patient Info\" tab in inbasket, or \"Choose Columns\" in Meds & Orders section of the refill encounter.           Passed - Normal TSH on file in past 12 months    Recent Labs   Lab Test  01/02/18   1105   TSH  2.18             Passed - No active pregnancy on record    If patient is pregnant or has had a positive pregnancy test, please check TSH.         Passed - No positive pregnancy test in past 12 months    If patient is pregnant or has had a positive pregnancy test, please check TSH.          thyroid (ARMOUR THYROID) 60 MG tablet  Last Written Prescription Date:  5/14/18  Last Fill Quantity: 30,  # refills: 1   Last office visit: 5/24/2018 with prescribing provider:  Dr. Muse   Future Office Visit:   Next 5 appointments (look out 90 days)     Jul 19, 2018  5:40 PM CDT   Office Visit with Edwin Chang MD   [facility] ([facility])    [address] [place] [phone]                   " [facility] Endoscopy Report      Preoperative Diagnosis:  - history of colon polyps       Postoperative Diagnosis:  - diverticulosis  - internal hemorrhoids      Procedure:    Colonoscopy       Surgeon:  Ashley Alcazar M.D.     Anesthesia:

## 2020-04-02 ENCOUNTER — OFFICE VISIT (OUTPATIENT)
Dept: URGENT CARE | Facility: URGENT CARE | Age: 65
End: 2020-04-02
Payer: MEDICARE

## 2020-04-02 VITALS
WEIGHT: 214.8 LBS | HEART RATE: 80 BPM | SYSTOLIC BLOOD PRESSURE: 161 MMHG | BODY MASS INDEX: 31.72 KG/M2 | TEMPERATURE: 98.5 F | DIASTOLIC BLOOD PRESSURE: 50 MMHG

## 2020-04-02 DIAGNOSIS — S61.012A LACERATION OF LEFT THUMB WITHOUT FOREIGN BODY WITHOUT DAMAGE TO NAIL, INITIAL ENCOUNTER: Primary | ICD-10-CM

## 2020-04-02 PROCEDURE — 90715 TDAP VACCINE 7 YRS/> IM: CPT | Performed by: FAMILY MEDICINE

## 2020-04-02 PROCEDURE — 99213 OFFICE O/P EST LOW 20 MIN: CPT | Mod: 25 | Performed by: FAMILY MEDICINE

## 2020-04-02 PROCEDURE — 90471 IMMUNIZATION ADMIN: CPT | Performed by: FAMILY MEDICINE

## 2020-04-02 ASSESSMENT — ENCOUNTER SYMPTOMS
VOMITING: 0
RHINORRHEA: 0
HEADACHES: 0
DIARRHEA: 0
SORE THROAT: 0
CHILLS: 0
SHORTNESS OF BREATH: 0
WOUND: 1
NAUSEA: 0
FEVER: 0
COUGH: 0

## 2020-04-02 NOTE — PROGRESS NOTES
SUBJECTIVE:   aMrie Kaiser is a 64 year old female presenting with a chief complaint of   Chief Complaint   Patient presents with     Laceration     Cut left thumb on a saw this morning. Pt wants Tetanus shot        She is an established patient of [facility].    Marie is a 64-year-old female presents today with a mild superficial laceration on her left thumb she was moving a saw in her garage and suffered an abrasion/laceration of her left radial side thumb.  She presents today not seeking laceration repair rather wanting a tetanus vaccination. it is been 10 years since her last tetanus vaccine.     She does have a home  and does therefore have close contact with young children. I do think a Tdap would be beneficial rather than a tetanus only.    Review of Systems   Constitutional: Negative for chills and fever.   HENT: Negative for congestion, ear pain, rhinorrhea and sore throat.    Respiratory: Negative for cough and shortness of breath.    Gastrointestinal: Negative for diarrhea, nausea and vomiting.   Skin: Positive for wound.   Neurological: Negative for headaches.       Past Medical History:   Diagnosis Date     Abnormal Papanicolaou smear of cervix and cervical HPV      Allergic rhinitis due to other allergen      Backache, unspecified      Cataract      Endometrial hyperplasia      Esophageal reflux      Female stress incontinence      Headache(784.0)      Intervertebral lumbar disc disorder with myelopathy, lumbar region      Irritable bowel syndrome      Lump or mass in breast      Myalgia and myositis, unspecified      Palpitations 5/26/2009     Panic disorder without agoraphobia      Symptomatic menopausal or female climacteric states      Viral warts, unspecified      Family History   Problem Relation Age of Onset     Asthma Father      Gastrointestinal Disease Father         hiatal hernia/acid reflux     Heart Disease Brother         Afib     Asthma Brother      Respiratory Brother          sleep apnea     Diabetes Brother      Hypertension Brother      Depression Brother      Respiratory Sister         sleep apnea     Hypertension Sister      Thyroid Disease Sister      Obesity Sister      Depression Sister      Diabetes Sister      Alzheimer Disease Mother         advanced dementia     Allergies Daughter         cats     Diabetes Other         neice and nephew     C.A.D. No family hx of      Breast Cancer No family hx of      Cancer - colorectal No family hx of      Prostate Cancer No family hx of      Cancer No family hx of      Current Outpatient Medications   Medication Sig Dispense Refill     albuterol (PROAIR HFA/PROVENTIL HFA/VENTOLIN HFA) 108 (90 Base) MCG/ACT inhaler Inhale 2 puffs into the lungs every 6 hours as needed for shortness of breath / dyspnea or wheezing (Patient not taking: Reported on 4/2/2020) 1 Inhaler 1     aspirin 325 MG tablet Take 81 mg by mouth        EPINEPHrine (EPIPEN/ADRENACLICK/OR ANY BX GENERIC EQUIV) 0.3 MG/0.3ML injection 2-pack INJECT 0.3 MLS (0.3 MG) INTO THE MUSCLE ONCE AS NEEDED FOR ANAPHYLAXIS (Patient not taking: Reported on 4/2/2020) 0.6 mL 1     felodipine ER (PLENDIL) 5 MG 24 hr tablet TAKE 1 TABLET BY MOUTH EVERY DAY (Patient not taking: Reported on 4/2/2020) 30 tablet 5     fexofenadine (ALLEGRA) 180 MG tablet Take 1 tablet (180 mg) by mouth daily (Patient not taking: Reported on 4/2/2020) 30 tablet 1     order for DME Equipment ordered: Respironics Auto PAP Mask type: Nasal Settings: 5-15 cm       thyroid (ARMOUR THYROID) 60 MG tablet Take 1 tablet (60 mg) by mouth daily (Patient not taking: Reported on 4/2/2020) 90 tablet 3     Social History     Tobacco Use     Smoking status: Never Smoker     Smokeless tobacco: Never Used   Substance Use Topics     Alcohol use: No       OBJECTIVE  BP (!) 161/50   Pulse 80   Temp 98.5  F (36.9  C) (Oral)   Wt 97.4 kg (214 lb 12.8 oz)   BMI 31.72 kg/m          Physical Exam  Neck:      Musculoskeletal: Normal  range of motion.   Cardiovascular:      Rate and Rhythm: Normal rate.   Pulmonary:      Effort: Pulmonary effort is normal.   Skin:     General: Skin is warm.      Capillary Refill: Capillary refill takes less than 2 seconds.      Findings: Lesion (mild abrasion left thumb without obvious lac or gap in wound ) present.   Neurological:      General: No focal deficit present.      Mental Status: She is alert.       ASSESSMENT:    ICD-10-CM    1. Laceration of left thumb without foreign body without damage to nail, initial encounter  S61.012A TDAP, IM (10 - 64 YRS) - Adacel        PLAN:  Wound cares emphasized  Signs of infection described as well as reasons to return to the clinic.   The patient indicates understanding of these issues and agrees with the plan.   Patient educational/instructional material provided including reasons for follow-up   William Velásquez MD

## 2020-04-21 ENCOUNTER — TELEPHONE (OUTPATIENT)
Dept: FAMILY MEDICINE | Facility: CLINIC | Age: 65
End: 2020-04-21

## 2020-04-21 NOTE — TELEPHONE ENCOUNTER
Panel Management Review   One phone call and send letter if unable to reach them or [employer] message and send letter if not read after 2 weeks (You will get a message to your inbasket)      BP Readings from Last 1 Encounters:   04/02/20 (!) 161/50        Health Maintenance Due   Topic Date Due     DEXA  1955     DEPRESSION ACTION PLAN  1955     ADVANCE CARE PLANNING  06/21/2016     MAMMO SCREENING  05/29/2017     INFLUENZA VACCINE (1) 09/01/2019     FALL RISK ASSESSMENT  10/08/2019     ASTHMA CONTROL TEST  02/26/2020     PHQ-9  02/26/2020     ZOSTER IMMUNIZATION (2 of 2) 12/07/2019        Fail List measure: BP check        Patient is due/failing the following:   BP CHECK    Action needed:   Patient needs nurse only appointment.    Type of outreach:    Phone, left message for patient to call back.     Questions for provider review:    None                                                                                       Chart routed to Care Team .                                            Gabby Khalil

## 2020-05-11 ENCOUNTER — TELEPHONE (OUTPATIENT)
Dept: FAMILY MEDICINE | Facility: CLINIC | Age: 65
End: 2020-05-11

## 2020-05-11 NOTE — TELEPHONE ENCOUNTER
Prior Authorization Approval    Authorization Effective Date: 5/11/2020  Authorization Expiration Date: 12/31/2020  Medication: thyroid (ARMOUR THYROID) 60 MG tablet- APPROVED   Approved Dose/Quantity:  Reference #:     Insurance Company: [employer] - Phone [phone] Fax [phone]  Expected CoPay:       CoPay Card Available:      Foundation Assistance Needed:    Which Pharmacy is filling the prescription (Not needed for infusion/clinic administered): [id number] [place] - [address] [facility]  Pharmacy Notified: Yes  Patient Notified: Comment:  **Instructed pharmacy to notify patient when script is ready to /ship.**

## 2020-05-11 NOTE — TELEPHONE ENCOUNTER
Central Prior Authorization Team  Phone: [phone]    PA Initiation    Medication: thyroid (ARMOUR THYROID) 60 MG tablet   Insurance Company: [employer] - Phone [phone] Fax [phone]  [facility]illing the [id number] [place], [place] - [address]  Filling Pharmacy Phone: [phone]  Filling Pharmacy Fax:    Start Date: 5/11/2020

## 2020-05-11 NOTE — TELEPHONE ENCOUNTER
Plan does not cover thyroid (ARMOUR THYROID) 60 MG tablet .  Please call [phone] to initiate Prior Auth or change med.    Insurance type and ID number: [id number]      Additional Information: thyroid (ARMOUR THYROID) 60 MG tablet  Is not on pt's formulary; preferred product is L-THY    Gabby Khalil

## 2020-08-03 DIAGNOSIS — I10 HYPERTENSION GOAL BP (BLOOD PRESSURE) < 140/90: Chronic | ICD-10-CM

## 2020-08-05 RX ORDER — FELODIPINE 5 MG/1
5 TABLET, EXTENDED RELEASE ORAL DAILY
Qty: 30 TABLET | Refills: 0 | Status: SHIPPED | OUTPATIENT
Start: 2020-08-05 | End: 2020-08-28

## 2020-08-27 ENCOUNTER — ALLIED HEALTH/NURSE VISIT (OUTPATIENT)
Dept: NURSING | Facility: CLINIC | Age: 65
End: 2020-08-27
Payer: MEDICARE

## 2020-08-27 VITALS — SYSTOLIC BLOOD PRESSURE: 136 MMHG | DIASTOLIC BLOOD PRESSURE: 75 MMHG | HEART RATE: 69 BPM

## 2020-08-27 DIAGNOSIS — Z01.30 BLOOD PRESSURE CHECK: Primary | ICD-10-CM

## 2020-08-27 DIAGNOSIS — I10 HYPERTENSION GOAL BP (BLOOD PRESSURE) < 140/90: Chronic | ICD-10-CM

## 2020-08-27 PROCEDURE — 99207 ZZC NO CHARGE NURSE ONLY: CPT

## 2020-08-27 NOTE — PROGRESS NOTES
Ancillary BP check    HTN Guidelines:  Age 18-59 BP range:  Less than 140/90  Age 60-85 with Diabetes:  Less than 140/90  Age 60-85 without Diabetes:  less than 150/90        Marie Kaiser is a 64 year old female who comes in today for a Blood Pressure check.    Patient is taking medication as prescribed  Patient is tolerating medications well.  Patient is monitoring Blood Pressure at home.  Average readings if yes are 140/70    BP goal: < 140/90mmHg (patient 18+ years of age with or without diabetes)    BP reading today: Passed     BP Readings from Last 1 Encounters:   08/27/20 136/75     A regular cuff was used.  Pulse: 69    Patient declined using clinic's blood pressure tower. She brought in her blood pressure machine and used it to check her blood pressure at nurse visit.    Vitals reviewed     Each reading recorded in vital signs section of chart: yes    Symptoms: none    Need for urgent appointment, based on criteria in ancillary BP workflow (elevated blood pressure and any of the following: dizziness, blurry vision, lightheadedness, severe shortness of breath, chest pain or visual disturbance): No       Plan: At goal follow up as directed by provider.      Completed by: Lena Jones MA  [facility]

## 2020-08-28 RX ORDER — FELODIPINE 5 MG/1
TABLET, EXTENDED RELEASE ORAL
Qty: 90 TABLET | Refills: 0 | Status: SHIPPED | OUTPATIENT
Start: 2020-08-28 | End: 2020-11-03

## 2020-08-28 NOTE — TELEPHONE ENCOUNTER
"Routing refill request to provider for review/approval because:  Labs not current:  Serum creatinine    Requested Prescriptions   Pending Prescriptions Disp Refills     felodipine ER (PLENDIL) 5 MG 24 hr tablet [Pharmacy Med Name: FELODIPINE ER 5 MG TABLET] 30 tablet 0     Sig: TAKE 1 TABLET (5 MG) BY MOUTH DAILY DO NOT CRUSH. TAKE ON EMPY STOMACH       Calcium Channel Blockers Protocol  Failed - 8/28/2020 11:35 AM        Failed - Normal serum creatinine on file in past 12 months     Recent Labs   Lab Test 08/26/19  1142   CR 0.70       Ok to refill medication if creatinine is low          Passed - Blood pressure under 140/90 in past 12 months     BP Readings from Last 3 Encounters:   08/27/20 136/75   04/02/20 (!) 161/50   10/25/19 133/83                 Passed - Recent (12 mo) or future (30 days) visit within the authorizing provider's specialty     Patient has had an office visit with the authorizing provider or a provider within the authorizing providers department within the previous 12 mos or has a future within next 30 days. See \"Patient Info\" tab in inbasket, or \"Choose Columns\" in Meds & Orders section of the refill encounter.              Passed - Medication is active on med list        Passed - Patient is age 18 or older        Passed - No active pregnancy on record        Passed - No positive pregnancy test in past 12 months             Julien Peña RN, BSN, PHN      "

## 2020-10-12 DIAGNOSIS — E03.4 HYPOTHYROIDISM DUE TO ACQUIRED ATROPHY OF THYROID: Chronic | ICD-10-CM

## 2020-10-12 NOTE — TELEPHONE ENCOUNTER
.Reason for call:  Medication   If this is a refill request, has the caller requested the refill from the pharmacy already? No  Will the patient be using a [facility]? No  [facility] the [facility]    Name of the medication requested:thyroid meds    Other request: pt is out and needs this refilled.    Phone number to reach patient:  Home number on file [phone] (home)    Best Time:  anytime    Can we leave a detailed message on this number?  YES    Travel screening: Negative

## 2020-10-20 ENCOUNTER — MYC MEDICAL ADVICE (OUTPATIENT)
Dept: FAMILY MEDICINE | Facility: CLINIC | Age: 65
End: 2020-10-20

## 2020-10-20 DIAGNOSIS — E03.4 HYPOTHYROIDISM DUE TO ACQUIRED ATROPHY OF THYROID: Primary | ICD-10-CM

## 2020-10-20 RX ORDER — THYROID 60 MG/1
60 TABLET ORAL DAILY
Qty: 90 TABLET | Refills: 0 | Status: SHIPPED | OUTPATIENT
Start: 2020-10-20 | End: 2021-02-08

## 2020-10-20 NOTE — TELEPHONE ENCOUNTER
"Routing refill request to provider for review/approval because:  Labs not current:  TSH    Requested Prescriptions   Pending Prescriptions Disp Refills     thyroid (ARMOUR THYROID) 60 MG tablet 30 tablet 0     Sig: Take 1 tablet (60 mg) by mouth daily       Thyroid Protocol Failed - 10/20/2020 10:27 AM        Failed - Normal TSH on file in past 12 months     Recent Labs   Lab Test 08/26/19  1142   TSH 1.17              Passed - Patient is 12 years or older        Passed - Recent (12 mo) or future (30 days) visit within the authorizing provider's specialty     Patient has had an office visit with the authorizing provider or a provider within the authorizing providers department within the previous 12 mos or has a future within next 30 days. See \"Patient Info\" tab in inbasket, or \"Choose Columns\" in Meds & Orders section of the refill encounter.              Passed - Medication is active on med list        Passed - No active pregnancy on record     If patient is pregnant or has had a positive pregnancy test, please check TSH.          Passed - No positive pregnancy test in past 12 months     If patient is pregnant or has had a positive pregnancy test, please check TSH.               Julien Peña RN, BSN, PHN    "

## 2020-10-20 NOTE — TELEPHONE ENCOUNTER
Please advise, pt has stated for the past few months that she has been waiting for her med for a month, but it has always been sent.    She has also been told f/u is needed.    Claudette BASILIO, Patient Care

## 2020-10-21 NOTE — TELEPHONE ENCOUNTER
Please call patient if she has not read my message - per her request in [employer].        SHEFALIK

## 2020-11-01 ENCOUNTER — NURSE TRIAGE (OUTPATIENT)
Dept: NURSING | Facility: CLINIC | Age: 65
End: 2020-11-01

## 2020-11-02 NOTE — TELEPHONE ENCOUNTER
"Patient calling as she was getting in the shower and looked at right wrist and has a redish spot, and it felt kind of \"like burning\".  Then started to see blood pooling and a bruise developing.  During the shower was trying to keep it elevated.  She noted that blood going into hand and then into forearm when would tip hand up and down.  At first it was just about a quarete and now is 1.5x2 inches area.  After 3-4 minutes in call, patient remarked that It is getting better with left hand over wrist.  It is about 1/2 the size now, about size of 1/2 dollar, puffyness is down a bit.    Advised patient to remove left hand from wrist and see if the area expands at all again.   After 2 minutes, it had not expanded at all.  It is swelling a little bit approx 1/18 of an inch  No painful, it is \"mushy\" in feeling.  Yesterday back was hurting last night so bad and took two asprin.    Does have a history of having blood vessels in fingers breaking since started taking B/P meds.      Advised patient that needs to be seen in the next few days and transferred to scheduling to obtain clinic visit.   Also advised, that if the area begins getting larger again tonight and more swollen, she needs to go into the ER for evaluation.    COVID 19 Nurse Triage Plan/Patient Instructions    Please be aware that novel coronavirus (COVID-19) may be circulating in the community. If you develop symptoms such as fever, cough, or SOB or if you have concerns about the presence of another infection including coronavirus (COVID-19), please contact your health care provider or visit [email or web address].     Disposition/Instructions    In-Person Visit with provider recommended. Reference Visit Selection Guide.    Thank you for taking steps to prevent the spread of this virus.  o Limit your contact with others.  o Wear a simple mask to cover your cough.  o Wash your hands well and often.    Resources     [facility] [place]: About COVID-19: " [email or web address]    CDC: What to Do If You're Sick: www.cdc.gov/coronavirus/2019-ncov/about/steps-when-sick.html    CDC: Ending Home Isolation: www.cdc.gov/coronavirus/2019-ncov/hcp/disposition-in-home-patients.html     CDC: Caring for Someone: www.cdc.gov/coronavirus/2019-ncov/if-you-are-sick/care-for-someone.html     [facility]: Interim Guidance for Hospital Discharge to Home: www.health.[facility].[place]./diseases/coronavirus/hcp/hospdischarge.pdf    [facility] clinical trials (COVID-19 research studies): clinicalaffairs.[facility]-clinical-trials     Below are the COVID-19 hotlines at the [place] Department of Health ([facility]). Interpreters are available.   o For health questions: Call 752-034-5962 or 1-747.984.3194 (7 a.m. to 7 p.m.)  o For questions about schools and childcare: Call 097-111-7226 or 1-478.454.6580 (7 a.m. to 7 p.m.)       ,Seema Rajput RN on 11/1/2020 at 9:01 PM                  Additional Information    Negative: Shock suspected (e.g., cold/pale/clammy skin, too weak to stand, low BP, rapid pulse)    Negative: Sounds like a life-threatening emergency to the triager    Negative: Bruises with fever    Negative: Tiny bruises (spots or dots) of unknown cause    Negative: Bruise(s) of forehead or head    Negative: Bruise(s) of face or jaw    Negative: Followed an injury, and triager doesn't know which injury guideline to use first    Negative: Post-operative bruising    Negative: Dizziness or lightheadedness    Negative: [1] Bruise on head/face, chest, or abdomen AND [2]  taking Coumadin (warfarin) or other strong blood thinner, or known bleeding disorder (e.g., thrombocytopenia)    Negative: Unexplained bleeding from another site (e.g., gums, nose, urine) as well    Negative: Patient sounds very sick or weak to the triager    Negative: [1] Not caused by an injury AND [2] 5 or more bruises now    Negative: [1] Raised bruise AND [2] size > 2 inches (5 cm) AND [3] getting  bigger    Negative: [1] SEVERE pain AND [2] not improved 2 hours after pain medicine/ice packs    Negative: Suspicious history for the injury    Negative: Taking Coumadin (warfarin) or other strong blood thinner, or known bleeding disorder (e.g., thrombocytopenia)    [1] Not caused by an injury AND [2] < 5 unexplained bruises    Protocols used: BRUISES-A-AH

## 2020-11-03 ENCOUNTER — OFFICE VISIT (OUTPATIENT)
Dept: FAMILY MEDICINE | Facility: CLINIC | Age: 65
End: 2020-11-03
Payer: MEDICARE

## 2020-11-03 VITALS
DIASTOLIC BLOOD PRESSURE: 84 MMHG | BODY MASS INDEX: 32.58 KG/M2 | RESPIRATION RATE: 12 BRPM | OXYGEN SATURATION: 97 % | HEART RATE: 93 BPM | HEIGHT: 69 IN | TEMPERATURE: 98.5 F | SYSTOLIC BLOOD PRESSURE: 164 MMHG | WEIGHT: 220 LBS

## 2020-11-03 DIAGNOSIS — I48.0 PAROXYSMAL ATRIAL FIBRILLATION (H): ICD-10-CM

## 2020-11-03 DIAGNOSIS — F33.42 MAJOR DEPRESSIVE DISORDER, RECURRENT EPISODE, IN FULL REMISSION (H): Chronic | ICD-10-CM

## 2020-11-03 DIAGNOSIS — Z23 NEED FOR VACCINATION: ICD-10-CM

## 2020-11-03 DIAGNOSIS — Z00.00 ENCOUNTER FOR MEDICARE ANNUAL WELLNESS EXAM: Primary | ICD-10-CM

## 2020-11-03 DIAGNOSIS — Z28.21 VACCINATION NOT CARRIED OUT BECAUSE OF PATIENT REFUSAL: ICD-10-CM

## 2020-11-03 DIAGNOSIS — E03.4 HYPOTHYROIDISM DUE TO ACQUIRED ATROPHY OF THYROID: Chronic | ICD-10-CM

## 2020-11-03 DIAGNOSIS — I10 ESSENTIAL HYPERTENSION WITH GOAL BLOOD PRESSURE LESS THAN 140/90: ICD-10-CM

## 2020-11-03 DIAGNOSIS — R23.3 EASY BRUISING: ICD-10-CM

## 2020-11-03 DIAGNOSIS — R73.01 IMPAIRED FASTING GLUCOSE: ICD-10-CM

## 2020-11-03 DIAGNOSIS — J45.20 MILD INTERMITTENT ASTHMA WITHOUT COMPLICATION: Chronic | ICD-10-CM

## 2020-11-03 LAB
ALBUMIN SERPL-MCNC: 3.7 G/DL (ref 3.4–5)
ALP SERPL-CCNC: 85 U/L (ref 40–150)
ALT SERPL W P-5'-P-CCNC: 21 U/L (ref 0–50)
ANION GAP SERPL CALCULATED.3IONS-SCNC: 8 MMOL/L (ref 3–14)
APTT PPP: 35 SEC (ref 22–37)
AST SERPL W P-5'-P-CCNC: 15 U/L (ref 0–45)
BASOPHILS # BLD AUTO: 0 10E9/L (ref 0–0.2)
BASOPHILS NFR BLD AUTO: 0.3 %
BILIRUB SERPL-MCNC: 0.2 MG/DL (ref 0.2–1.3)
BUN SERPL-MCNC: 21 MG/DL (ref 7–30)
CALCIUM SERPL-MCNC: 8.8 MG/DL (ref 8.5–10.1)
CHLORIDE SERPL-SCNC: 107 MMOL/L (ref 94–109)
CO2 SERPL-SCNC: 26 MMOL/L (ref 20–32)
CREAT SERPL-MCNC: 0.75 MG/DL (ref 0.52–1.04)
DIFFERENTIAL METHOD BLD: NORMAL
EOSINOPHIL # BLD AUTO: 0.1 10E9/L (ref 0–0.7)
EOSINOPHIL NFR BLD AUTO: 1.7 %
ERYTHROCYTE [DISTWIDTH] IN BLOOD BY AUTOMATED COUNT: 12.3 % (ref 10–15)
GFR SERPL CREATININE-BSD FRML MDRD: 83 ML/MIN/{1.73_M2}
GLUCOSE SERPL-MCNC: 99 MG/DL (ref 70–99)
HBA1C MFR BLD: 6 % (ref 0–5.6)
HCT VFR BLD AUTO: 38.4 % (ref 35–47)
HGB BLD-MCNC: 12.4 G/DL (ref 11.7–15.7)
INR PPP: 0.91 (ref 0.86–1.14)
LYMPHOCYTES # BLD AUTO: 1.7 10E9/L (ref 0.8–5.3)
LYMPHOCYTES NFR BLD AUTO: 23.1 %
MCH RBC QN AUTO: 28.2 PG (ref 26.5–33)
MCHC RBC AUTO-ENTMCNC: 32.3 G/DL (ref 31.5–36.5)
MCV RBC AUTO: 88 FL (ref 78–100)
MONOCYTES # BLD AUTO: 0.5 10E9/L (ref 0–1.3)
MONOCYTES NFR BLD AUTO: 6.8 %
NEUTROPHILS # BLD AUTO: 5.1 10E9/L (ref 1.6–8.3)
NEUTROPHILS NFR BLD AUTO: 68.1 %
PLATELET # BLD AUTO: 294 10E9/L (ref 150–450)
POTASSIUM SERPL-SCNC: 3.7 MMOL/L (ref 3.4–5.3)
PROT SERPL-MCNC: 7.3 G/DL (ref 6.8–8.8)
RBC # BLD AUTO: 4.39 10E12/L (ref 3.8–5.2)
SODIUM SERPL-SCNC: 141 MMOL/L (ref 133–144)
TSH SERPL DL<=0.005 MIU/L-ACNC: 1.22 MU/L (ref 0.4–4)
WBC # BLD AUTO: 7.5 10E9/L (ref 4–11)

## 2020-11-03 PROCEDURE — 85025 COMPLETE CBC W/AUTO DIFF WBC: CPT | Performed by: FAMILY MEDICINE

## 2020-11-03 PROCEDURE — 90732 PPSV23 VACC 2 YRS+ SUBQ/IM: CPT | Performed by: FAMILY MEDICINE

## 2020-11-03 PROCEDURE — G0009 ADMIN PNEUMOCOCCAL VACCINE: HCPCS | Performed by: FAMILY MEDICINE

## 2020-11-03 PROCEDURE — 85730 THROMBOPLASTIN TIME PARTIAL: CPT | Performed by: FAMILY MEDICINE

## 2020-11-03 PROCEDURE — 80053 COMPREHEN METABOLIC PANEL: CPT | Performed by: FAMILY MEDICINE

## 2020-11-03 PROCEDURE — 36415 COLL VENOUS BLD VENIPUNCTURE: CPT | Performed by: FAMILY MEDICINE

## 2020-11-03 PROCEDURE — 99397 PER PM REEVAL EST PAT 65+ YR: CPT | Mod: 25 | Performed by: FAMILY MEDICINE

## 2020-11-03 PROCEDURE — 85610 PROTHROMBIN TIME: CPT | Performed by: FAMILY MEDICINE

## 2020-11-03 PROCEDURE — 99213 OFFICE O/P EST LOW 20 MIN: CPT | Mod: 25 | Performed by: FAMILY MEDICINE

## 2020-11-03 PROCEDURE — 83036 HEMOGLOBIN GLYCOSYLATED A1C: CPT | Performed by: FAMILY MEDICINE

## 2020-11-03 PROCEDURE — 84443 ASSAY THYROID STIM HORMONE: CPT | Performed by: FAMILY MEDICINE

## 2020-11-03 RX ORDER — FELODIPINE 5 MG/1
5 TABLET, EXTENDED RELEASE ORAL
Qty: 180 TABLET | Refills: 1 | Status: SHIPPED | OUTPATIENT
Start: 2020-11-03 | End: 2021-05-10

## 2020-11-03 ASSESSMENT — MIFFLIN-ST. JEOR: SCORE: 1602.29

## 2020-11-03 ASSESSMENT — PAIN SCALES - GENERAL: PAINLEVEL: MILD PAIN (3)

## 2020-11-03 NOTE — LETTER
My Asthma Action Plan    Name: Marie Kaiser   YOB: 1955  Date: 11/3/2020   My doctor: Edwin Chang MD   My clinic: [facility]        My Rescue Medicine:   Albuterol inhaler (Proair/Ventolin/Proventil HFA)  2-4 puffs EVERY 4 HOURS as needed. Use a spacer if recommended by your provider.   My Asthma Severity:   Intermittent / Exercise Induced  Know your asthma triggers.  upper respiratory infections  pollens          GREEN ZONE   Good Control    I feel good    No cough or wheeze    Can work, sleep and play without asthma symptoms       Take your asthma control medicine every day.     1. If exercise triggers your asthma, take your rescue medication    15 minutes before exercise or sports, and    During exercise if you have asthma symptoms  2. Spacer to use with inhaler: If you have a spacer, make sure to use it with your inhaler             YELLOW ZONE Getting Worse  I have ANY of these:    I do not feel good    Cough or wheeze    Chest feels tight    Wake up at night   1. Keep taking your Green Zone medications  2. Start taking your rescue medicine:    every 20 minutes for up to 1 hour. Then every 4 hours for 24-48 hours.  3. If you stay in the Yellow Zone for more than 12-24 hours, contact your doctor.  4. If you do not return to the Green Zone in 12-24 hours or you get worse, start taking your oral steroid medicine if prescribed by your provider.           RED ZONE Medical Alert - Get Help  I have ANY of these:    I feel awful    Medicine is not helping    Breathing getting harder    Trouble walking or talking    Nose opens wide to breathe       1. Take your rescue medicine NOW  2. If your provider has prescribed an oral steroid medicine, start taking it NOW  3. Call your doctor NOW  4. If you are still in the Red Zone after 20 minutes and you have not reached your doctor:    Take your rescue medicine again and    Call 911 or go to the emergency room right  away    See your regular doctor within 2 weeks of an Emergency Room or Urgent Care visit for follow-up treatment.          Annual Reminders:  Meet with Asthma Educator,  Flu Shot in the Fall, consider Pneumonia Vaccination for patients with asthma (aged 19 and older).    Pharmacy: [facility] [id number] [place] [facility] [place], [place] - [address]    Electronically signed by Edwin Chang MD   Date: 11/03/20                    Asthma Triggers  How To Control Things That Make Your Asthma Worse    Triggers are things that make your asthma worse.  Look at the list below to help you find your triggers and   what you can do about them. You can help prevent asthma flare-ups by staying away from your triggers.      Trigger                                                          What you can do   Cigarette Smoke  Tobacco smoke can make asthma worse. Do not allow smoking in your home, car or around you.  Be sure no one smokes at a child s day care or school.  If you smoke, ask your health care provider for ways to help you quit.  Ask family members to quit too.  Ask your health care provider for a referral to Quit Plan to help you quit smoking, or call 9-629-453-PLAN.     Colds, Flu, Bronchitis  These are common triggers of asthma. Wash your hands often.  Don t touch your eyes, nose or mouth.  Get a flu shot every year.     Dust Mites  These are tiny bugs that live in cloth or carpet. They are too small to see. Wash sheets and blankets in hot water every week.   Encase pillows and mattress in dust mite proof covers.  Avoid having carpet if you can. If you have carpet, vacuum weekly.   Use a dust mask and HEPA vacuum.   Pollen and Outdoor Mold  Some people are allergic to trees, grass, or weed pollen, or molds. Try to keep your windows closed.  Limit time out doors when pollen count is high.   Ask you health care provider about taking medicine during allergy season.     Animal Dander  Some people are allergic to skin flakes, urine or  saliva from pets with fur or feathers. Keep pets with fur or feathers out of your home.    If you can t keep the pet outdoors, then keep the pet out of your bedroom.  Keep the bedroom door closed.  Keep pets off cloth furniture and away from stuffed toys.     Mice, Rats, and Cockroaches  Some people are allergic to the waste from these pests.   Cover food and garbage.  Clean up spills and food crumbs.  Store grease in the refrigerator.   Keep food out of the bedroom.   Indoor Mold  This can be a trigger if your home has high moisture. Fix leaking faucets, pipes, or other sources of water.   Clean moldy surfaces.  Dehumidify basement if it is damp and smelly.   Smoke, Strong Odors, and Sprays  These can reduce air quality. Stay away from strong odors and sprays, such as perfume, powder, hair spray, paints, smoke incense, paint, cleaning products, candles and new carpet.   Exercise or Sports  Some people with asthma have this trigger. Be active!  Ask your doctor about taking medicine before sports or exercise to prevent symptoms.    Warm up for 5-10 minutes before and after sports or exercise.     Other Triggers of Asthma  Cold air:  Cover your nose and mouth with a scarf.  Sometimes laughing or crying can be a trigger.  Some medicines and food can trigger asthma.

## 2020-11-03 NOTE — PROGRESS NOTES
"Subjective     Marie Kaiser is a 65 year old female who presents to clinic today for the following health issues:    HPI         Concern - bruising on right wrist  Onset: evening of 11/1/20  Description: bruise developed and spread on dorsum of wrist  Intensity: mild  Progression of Symptoms:  same  Accompanying Signs & Symptoms: patient states felt spongy (resolved) and some burning   Previous history of similar problem: no  Precipitating factors:        Worsened by: overuse of hand - patient states area seem to get darker   Alleviating factors:        Improved by: None.  Therapies tried and outcome: elevating and putting pressure on right hand/wrist, ice pack (2 days ago)    Annual Wellness Visit    Patient has been advised of split billing requirements and indicates understanding: Yes     Are you in the first 12 months of your Medicare Part B coverage?  No    Physical Health:    In general, how would you rate your overall physical health? poor    Outside of work, how many days during the week do you exercise?none    Outside of work, approximately how many minutes a day do you exercise?not applicable    If you drink alcohol do you typically have >3 drinks per day or >7 drinks per week? No    Do you usually eat at least 4 servings of fruit and vegetables a day, include whole grains & fiber and avoid regularly eating high fat or \"junk\" foods? Yes    Do you have any problems taking medications regularly? No    Do you have any side effects from medications? none    Needs assistance for the following daily activities: shopping, not able to stand too long due to back and hip pain.     Which of the following safety concerns are present in your home?  lack of grab bars in the bathroom     Hearing impairment: Yes, ringing in both ears.     In the past 6 months, have you been bothered by leaking of urine? no    Mental Health:    In general, how would you rate your overall mental or emotional health? excellent  PHQ-2 " Score:  0    Do you feel safe in your environment? Yes    Have you ever done Advance Care Planning? (For example, a Health Directive, POLST, or a discussion with a medical provider or your loved ones about your wishes)? No, advance care planning information given to patient to review.  Patient plans to discuss their wishes with loved ones or provider.      Fall risk:  Fallen 2 or more times in the past year?: No  Any fall with injury in the past year?: No    Cognitive Screenin) Repeat 3 items (Leader, Season, Table)    2) Clock draw: NORMAL  3) 3 item recall: Recalls 3 objects  Results: NORMAL clock, 1-2 items recalled: COGNITIVE IMPAIRMENT LESS LIKELY    Mini-CogTM Copyright S Ari. Licensed by the author for use in [place] [employer]; reprinted with permission (araceli@[facility]). All rights reserved.      Do you have sleep apnea, excessive snoring or daytime drowsiness?: yes    Current providers sharing in care for this patient include:     Patient Care Team:  Rosalinda Muse MD as PCP - General  Yanni Moura PA-C as Assigned PCP    Patient has been advised of split billing requirements and indicates understanding: Yes  Hypertension Follow-up      Do you check your blood pressure regularly outside of the clinic? Yes     Are you following a low salt diet? Yes    Are your blood pressures ever more than 140 on the top number (systolic) OR more   than 90 on the bottom number (diastolic), for example 140/90? Yes      Social History     Tobacco Use     Smoking status: Never Smoker     Smokeless tobacco: Never Used   Substance Use Topics     Alcohol use: No                           Current providers sharing in care for this patient include:   Patient Care Team:  Rosalinda Muse MD as PCP - Yanni Momin PA-C as Assigned PCP    The following health maintenance items are reviewed in Epic and correct as of today:  Health Maintenance   Topic Date Due     FARAZ  1955     DEPRESSION  "ACTION PLAN  1955     MAMMO SCREENING  05/29/2017     FALL RISK ASSESSMENT  10/08/2019     ASTHMA CONTROL TEST  02/26/2020     PHQ-9  02/26/2020     TSH W/FREE T4 REFLEX  08/26/2020     ASTHMA ACTION PLAN  08/27/2020     INFLUENZA VACCINE (1) 09/01/2020     Pneumococcal Vaccine: 65+ Years (2 of 2 - PPSV23) 10/09/2020     COLORECTAL CANCER SCREENING  08/08/2021     MEDICARE ANNUAL WELLNESS VISIT  11/03/2021     LIPID  10/14/2024     ADVANCE CARE PLANNING  11/03/2025     DTAP/TDAP/TD IMMUNIZATION (3 - Td) 04/02/2030     HEPATITIS C SCREENING  Completed     ZOSTER IMMUNIZATION  Completed     Pneumococcal Vaccine: Pediatrics (0 to 5 Years) and At-Risk Patients (6 to 64 Years)  Aged Out     IPV IMMUNIZATION  Aged Out     MENINGITIS IMMUNIZATION  Aged Out     HEPATITIS B IMMUNIZATION  Aged Out     Past medical, family, and social histories, medications, and allergies are reviewed and updated in Epic.     ROS:  Constitutional, HEENT, cardiovascular, pulmonary, gi and gu systems are negative, except as otherwise noted.    OBJECTIVE:   BP (!) 160/82   Pulse 93   Temp 98.5  F (36.9  C) (Oral)   Resp 12   Ht 1.753 m (5' 9\")   Wt 99.8 kg (220 lb)   SpO2 97%   BMI 32.49 kg/m   Estimated body mass index is 32.49 kg/m  as calculated from the following:    Height as of this encounter: 1.753 m (5' 9\").    Weight as of this encounter: 99.8 kg (220 lb).  EXAM:   GENERAL: healthy, alert and no distress  EYES: Eyes grossly normal to inspection, PERRL, EOMI, sclerae white and conjunctivae normal  RESP: lungs clear to auscultation - no crackles or wheezes, no areas of dullness, no tachypnea  CV: Heart regular rate and rhythm without murmur, click or rub. No peripheral edema and peripheral pulses strong  MS: no gross musculoskeletal defects noted, no edema  SKIN: ecchymoses on dorsum of RT wrist  NEURO: Normal strength and tone, sensory exam grossly normal, mentation intact, oriented times 3 and cranial nerves 2-12 " intact  PSYCH: mentation appears normal, affect normal/bright       ASSESSMENT / PLAN:   (Z00.00) Encounter for Medicare annual wellness exam  (primary encounter diagnosis)  Comment:   Plan: Pneumococcal vaccine 23 valent PPSV23          (Pneumovax) [90306]        Follow-up in 1 year    (I48.0) Paroxysmal atrial fibrillation (H)  Comment: Rate controlled  Plan: CBC with platelets differential, INR, Partial         thromboplastin time          (R23.8) Easy bruising  Comment: She appears to have bled into the dorsum of the wrist from a small vascular break in the skin.  History suggests that she feels like this happens often although not as bad as this  Plan: CBC with platelets differential, INR, Partial         thromboplastin time          (I10) Essential hypertension with goal blood pressure less than 140/90  Comment: Uncontrolled  Plan: CBC with platelets differential, Comprehensive         metabolic panel (BMP + Alb, Alk Phos, ALT, AST,        Total. Bili, TP)         Return in about 1 month (around 12/3/2020) for blood pressure check.      (E03.4) Hypothyroidism due to acquired atrophy of thyroid  Comment: Historically euthyroid, but her last TSH was 14+ months ago  Plan: TSH with free T4 reflex            (R73.01) Impaired fasting glucose  Comment:   Plan: Hemoglobin A1c, Comprehensive metabolic panel         (BMP + Alb, Alk Phos, ALT, AST, Total. Bili,         TP)          (J45.20) Mild intermittent asthma without complication  Comment:   Plan: Asthma Action Plan (AAP)          (F33.42) Major depressive disorder, recurrent episode, in full remission (HCC)  Comment:   Plan: DEPRESSION ACTION PLAN (DAP)          (Z23) Need for vaccination  Comment:   Plan: Pneumococcal vaccine 23 valent PPSV23          (Pneumovax) [16717], ADMIN MEDICARE:         Pneumococcal Vaccine ()            (Z28.21) Vaccination not carried out because of patient refusal  Comment:   Plan: Influenza vaccine offered but declined by the  "patient.       COUNSELING:  Reviewed preventive health counseling, as reflected in patient instructions  Special attention given to:       Due to reschedule mammogram       Regular exercise       Immunizations    Vaccinated for: Pneumococcal and Declined: Influenza due to Conscientious objector               Aspirin Prophylaxsis : avoid for now because of easy bleeding    Estimated body mass index is 32.49 kg/m  as calculated from the following:    Height as of this encounter: 1.753 m (5' 9\").    Weight as of this encounter: 99.8 kg (220 lb).    Weight management plan: Discussed healthy diet and exercise guidelines as summarized in the AVS    She reports that she has never smoked. She has never used smokeless tobacco.    Appropriate preventive services were discussed with this patient, including applicable screening as appropriate for cardiovascular disease, diabetes, osteopenia/osteoporosis, and glaucoma.  As appropriate for age/gender, discussed screening for colorectal cancer, prostate cancer, breast cancer, and cervical cancer. Checklist reviewing preventive services available has been given to the patient.    Reviewed patients plan of care and provided an AVS. The Basic Care Plan (routine screening as documented in Health Maintenance) for Marie meets the Care Plan requirement. This Care Plan has been established and reviewed with the Patient.    Counseling Resources:  ATP IV Guidelines  Pooled Cohorts Equation Calculator  Breast Cancer Risk Calculator  BRCA-Related Cancer Risk Assessment: FHS-7 Tool  FRAX Risk Assessment  ICSI Preventive Guidelines  Dietary Guidelines for Americans, 2010  USDA's MyPlate  ASA Prophylaxis  Lung CA Screening    Edwin Chang MD  [facility]  "

## 2020-11-03 NOTE — PATIENT INSTRUCTIONS
At [facility], we strive to deliver an exceptional experience to you, every time we see you. If you receive a survey, please complete it as we do value your feedback.  If you have MyChart, you can expect to receive results automatically within 24 hours of their completion.  Your provider will send a note interpreting your results as well.   If you do not have MyChart, you should receive your results in about a week by mail.    Your care team:                            Family Medicine Internal Medicine   MD Venu Edge MD Shantel Branch-Fleming, MD Srinivasa Vaka, MD Katya Georgiev PA-C Megan Hill, APRN CNP    Maurizio Armenta, MD Pediatrics   Cj Horan, PABenjaminC  Syeda Phan, CNP MD Racheal Starr APRN CNP   MD Bre Ramos MD Deborah Mielke, MD Theresa Trimble, APRN CNP  Yanni Jones PAANGELITA Tubbs, CNP  MD Rosalinda Campbell MD Angela Wermerskirchen, MD      Clinic hours: Monday - Thursday 7 am-7 pm; Fridays 7 am-5 pm.   Urgent care: Monday - Friday 11 am-9 pm; Saturday and Sunday 9 am-5 pm.    Clinic: [phone]       [facility]: Monday - Thursday 8 am - 7 pm; Friday 8 am - 6 pm  [facility]: [phone]     Use www.[email or web address].org for 24/7 diagnosis and treatment of dozens of conditions.      Preventive Health Recommendations    See your health care provider every year to    Review health changes.     Discuss preventive care.      Review your medicines if your doctor has prescribed any.    You no longer need a yearly Pap test unless you've had an abnormal Pap test in the past 10 years. If you have vaginal symptoms, such as bleeding or discharge, be sure to talk with your provider about a Pap test.    Every 1 to 2 years, have a mammogram.  If you are over 69, talk with your health care provider about whether or not you want to continue having  screening mammograms.    Every 10 years, have a colonoscopy. Or, have a yearly FIT test (stool test). These exams will check for colon cancer.     Have a cholesterol test every 5 years, or more often if your doctor advises it.     Have a diabetes test (fasting glucose) every three years. If you are at risk for diabetes, you should have this test more often.     At age 65, have a bone density scan (DEXA) to check for osteoporosis (brittle bone disease).    Shots:    Get a flu shot each year.    Get a tetanus shot every 10 years.    Talk to your doctor about your pneumonia vaccines. There are now two you should receive - Pneumovax (PPSV 23) and Prevnar (PCV 13).    Talk to your pharmacist about the shingles vaccine.    Talk to your doctor about the hepatitis B vaccine.    Nutrition:     Eat at least 5 servings of fruits and vegetables each day.    Eat whole-grain bread, whole-wheat pasta and brown rice instead of white grains and rice.    Get adequate Calcium and Vitamin D.     Lifestyle    Aerobic exercise for at least 150 minutes per week (40+ minutes per day, 4-6 days per week). This will help you control your weight and prevent disease.     Limit alcohol to one drink per day.    No smoking.     Wear sunscreen to prevent skin cancer.     See your dentist twice a year for an exam and cleaning.    See your eye doctor every 1 to 2 years to screen for conditions such as glaucoma, macular degeneration and cataracts.    Personalized Prevention Plan  You are due for the preventive services outlined below.  Your care team is available to assist you in scheduling these services.  If you have already completed any of these items, please share that information with your care team to update in your medical record.  Health Maintenance   Topic Date Due     DEXA  1955     MAMMO SCREENING  05/29/2017     FALL RISK ASSESSMENT  10/08/2019     ASTHMA CONTROL TEST  02/26/2020     PHQ-9  02/26/2020     TSH W/FREE T4 REFLEX   08/26/2020     INFLUENZA VACCINE (1) 09/01/2020     Pneumococcal Vaccine: 65+ Years (2 of 2 - PPSV23) 10/09/2020     COLORECTAL CANCER SCREENING  08/08/2021     MEDICARE ANNUAL WELLNESS VISIT  11/03/2021     ASTHMA ACTION PLAN  11/03/2021     LIPID  10/14/2024     ADVANCE CARE PLANNING  11/03/2025     DTAP/TDAP/TD IMMUNIZATION (3 - Td) 04/02/2030     HEPATITIS C SCREENING  Completed     DEPRESSION ACTION PLAN  Completed     ZOSTER IMMUNIZATION  Completed     Pneumococcal Vaccine: Pediatrics (0 to 5 Years) and At-Risk Patients (6 to 64 Years)  Aged Out     IPV IMMUNIZATION  Aged Out     MENINGITIS IMMUNIZATION  Aged Out     HEPATITIS B IMMUNIZATION  Aged Out       Patient Education   Personalized Prevention Plan  You are due for the preventive services outlined below.  Your care team is available to assist you in scheduling these services.  If you have already completed any of these items, please share that information with your care team to update in your medical record.  Health Maintenance Due   Topic Date Due     Osteoporosis Screening  1955     Mammogram  05/29/2017     FALL RISK ASSESSMENT  10/08/2019     Asthma Control Test  02/26/2020     Depression Assessment  02/26/2020     Thyroid Function Lab  08/26/2020     Flu Vaccine (1) 09/01/2020     Pneumococcal Vaccine (2 of 2 - PPSV23) 10/09/2020     Preventive Health Recommendations    See your health care provider every year to    Review health changes.     Discuss preventive care.      Review your medicines if your doctor has prescribed any.    You no longer need a yearly Pap test unless you've had an abnormal Pap test in the past 10 years. If you have vaginal symptoms, such as bleeding or discharge, be sure to talk with your provider about a Pap test.    Every 1 to 2 years, have a mammogram.  If you are over 69, talk with your health care provider about whether or not you want to continue having screening mammograms.    Every 10 years, have a colonoscopy.  Or, have a yearly FIT test (stool test). These exams will check for colon cancer.     Have a cholesterol test every 5 years, or more often if your doctor advises it.     Have a diabetes test (fasting glucose) every three years. If you are at risk for diabetes, you should have this test more often.     At age 65, have a bone density scan (DEXA) to check for osteoporosis (brittle bone disease).    Shots:    Get a flu shot each year.    Get a tetanus shot every 10 years.    Talk to your doctor about your pneumonia vaccines. There are now two you should receive - Pneumovax (PPSV 23) and Prevnar (PCV 13).    Talk to your pharmacist about the shingles vaccine.    Talk to your doctor about the hepatitis B vaccine.    Nutrition:     Eat at least 5 servings of fruits and vegetables each day.    Eat whole-grain bread, whole-wheat pasta and brown rice instead of white grains and rice.    Get adequate Calcium and Vitamin D.     Lifestyle    Exercise at least 150 minutes a week (30 minutes a day, 5 days a week). This will help you control your weight and prevent disease.    Limit alcohol to one drink per day.    No smoking.     Wear sunscreen to prevent skin cancer.     See your dentist twice a year for an exam and cleaning.    See your eye doctor every 1 to 2 years to screen for conditions such as glaucoma, macular degeneration and cataracts.    Personalized Prevention Plan  You are due for the preventive services outlined below.  Your care team is available to assist you in scheduling these services.  If you have already completed any of these items, please share that information with your care team to update in your medical record.  Health Maintenance   Topic Date Due     DEXA  1955     MAMMO SCREENING  05/29/2017     FALL RISK ASSESSMENT  10/08/2019     ASTHMA CONTROL TEST  02/26/2020     PHQ-9  02/26/2020     TSH W/FREE T4 REFLEX  08/26/2020     INFLUENZA VACCINE (1) 09/01/2020     Pneumococcal Vaccine: 65+ Years (2  of 2 - PPSV23) 10/09/2020     COLORECTAL CANCER SCREENING  08/08/2021     MEDICARE ANNUAL WELLNESS VISIT  11/03/2021     ASTHMA ACTION PLAN  11/03/2021     LIPID  10/14/2024     ADVANCE CARE PLANNING  11/03/2025     DTAP/TDAP/TD IMMUNIZATION (3 - Td) 04/02/2030     HEPATITIS C SCREENING  Completed     DEPRESSION ACTION PLAN  Completed     ZOSTER IMMUNIZATION  Completed     Pneumococcal Vaccine: Pediatrics (0 to 5 Years) and At-Risk Patients (6 to 64 Years)  Aged Out     IPV IMMUNIZATION  Aged Out     MENINGITIS IMMUNIZATION  Aged Out     HEPATITIS B IMMUNIZATION  Aged Out

## 2020-11-03 NOTE — LETTER
My Depression Action Plan  Name: Marie Kaiser   Date of Birth 1955  Date: 11/3/2020    My doctor: Rosalinda Muse   My clinic: [address] [id number] [phone]          GREEN    ZONE   Good Control    What it looks like:     Things are going generally well. You have normal ups and downs. You may even feel depressed from time to time, but bad moods usually last less than a day.   What you need to do:  1. Continue to care for yourself (see self care plan)  2. Check your depression survival kit and update it as needed  3. Follow your physician s recommendations including any medication.  4. Do not stop taking medication unless you consult with your physician first.           YELLOW         ZONE Getting Worse    What it looks like:     Depression is starting to interfere with your life.     It may be hard to get out of bed; you may be starting to isolate yourself from others.    Symptoms of depression are starting to last most all day and this has happened for several days.     You may have suicidal thoughts but they are not constant.   What you need to do:     1. Call your care team. Your response to treatment will improve if you keep your care team informed of your progress. Yellow periods are signs an adjustment may need to be made.     2. Continue your self-care.  Just get dressed and ready for the day.  Don't give yourself time to talk yourself out of it.    3. Talk to someone in your support network.    4. Open up your Depression Self-Care Plan/Wellness Kit.           RED    ZONE Medical Alert - Get Help    What it looks like:     Depression is seriously interfering with your life.     You may experience these or other symptoms: You can t get out of bed most days, can t work or engage in other necessary activities, you have trouble taking care of basic hygiene, or basic responsibilities, thoughts of suicide or  death that will not go away, self-injurious behavior.     What you need to do:  1. Call your care team and request a same-day appointment. If they are not available (weekends or after hours) call your local crisis line, emergency room or 911.            Depression Self-Care Plan / Wellness Kit    Self-Care for Depression  Here s the deal. Your body and mind are really not as separate as most people think.  What you do and think affects how you feel and how you feel influences what you do and think. This means if you do things that people who feel good do, it will help you feel better.  Sometimes this is all it takes.  There is also a place for medication and therapy depending on how severe your depression is, so be sure to consult with your medical provider and/ or Behavioral Health Consultant if your symptoms are worsening or not improving.     In order to better manage my stress, I will:    Exercise  Get some form of exercise, every day. This will help reduce pain and release endorphins, the  feel good  chemicals in your brain. This is almost as good as taking antidepressants!  This is not the same as joining a gym and then never going! (they count on that by the way ) It can be as simple as just going for a walk or doing some gardening, anything that will get you moving.      Hygiene   Maintain good hygiene (get out of bed in the morning, make your bed, brush your teeth, take a shower, and get dressed like you were going to work, even if you are unemployed).  If your clothes don't fit try to get ones that do.    Diet  Strive to eat foods that are good for me, drink plenty of water, and avoid excessive sugar, caffeine, alcohol, and other mood-altering substances.  Some foods that are helpful in depression are: complex carbohydrates, B vitamins, flaxseed, fish or fish oil, fresh fruits and vegetables.    Psychotherapy  Agree to participate in Individual Therapy (if recommended).    Medication  If prescribed  medications, I agree to take them.  Missing doses can result in serious side effects.  I understand that drinking alcohol, or other illicit drug use, may cause potential side effects.  I will not stop my medication abruptly without first discussing it with my provider.    Staying Connected With Others  Stay in touch with my friends, family members, and my primary care provider/team.    Use your imagination  Be creative.  We all have a creative side; it doesn t matter if it s oil painting, sand castles, or mud pies! This will also kick up the endorphins.    Witness Beauty  (AKA stop and smell the roses) Take a look outside, even in mid-winter. Notice colors, textures. Watch the squirrels and birds.     Service to others  Be of service to others.  There is always someone else in need.  By helping others we can  get out of ourselves  and remember the really important things.  This also provides opportunities for practicing all the other parts of the program.    Humor  Laugh and be silly!  Adjust your TV habits for less news and crime-drama and more comedy.    Control your stress  Try breathing deep, massage therapy, biofeedback, and meditation. Find time to relax each day.     Crisis Text Line  http://www.crisistextline.org    The Crisis Text Line serves anyone, in any type of crisis, providing access to free, 24/7 support and information via the medium people already use and trust:    Here's how it works:  1.  Text 365-068 from anywhere in the [place], anytime, about any type of crisis.  2.  A live, trained Crisis Counselor receives the text and responds quickly.  3.  The volunteer Crisis Counselor will help you move from a 'hot moment to a cool moment'.    My support system    Clinic Contact:  Phone number:    Contact 1:  Phone number:    Contact 2:  Phone number:    Gnosticist/:  Phone number:    Therapist:  Phone number:    Local crisis center:    Phone number:    Other community support:  Phone number:

## 2020-11-03 NOTE — NURSING NOTE
Prior to immunization administration, verified patients identity using patient s name and date of birth. Please see Immunization Activity for additional information.     Screening Questionnaire for Adult Immunization    Are you sick today?   No   Do you have allergies to medications, food, a vaccine component or latex?   Yes   Have you ever had a serious reaction after receiving a vaccination?   No   Do you have a long-term health problem with heart, lung, kidney, or metabolic disease (e.g., diabetes), asthma, a blood disorder, no spleen, complement component deficiency, a cochlear implant, or a spinal fluid leak?  Are you on long-term aspirin therapy?   No   Do you have cancer, leukemia, HIV/AIDS, or any other immune system problem?   No   Do you have a parent, brother, or sister with an immune system problem?   No   In the past 3 months, have you taken medications that affect  your immune system, such as prednisone, other steroids, or anticancer drugs; drugs for the treatment of rheumatoid arthritis, Crohn s disease, or psoriasis; or have you had radiation treatments?   No   Have you had a seizure, or a brain or other nervous system problem?   No   During the past year, have you received a transfusion of blood or blood    products, or been given immune (gamma) globulin or antiviral drug?   No   For women: Are you pregnant or is there a chance you could become       pregnant during the next month?   No   Have you received any vaccinations in the past 4 weeks?   No       Per orders of Dr. Chang, injection of ppsv 23 given by Lena Jones MA. Patient instructed to remain in clinic for 15 minutes afterwards, and to report any adverse reaction to me immediately.       Screening performed by Lena Jones MA on 11/3/2020 at 5:39 PM.

## 2020-11-04 ASSESSMENT — PATIENT HEALTH QUESTIONNAIRE - PHQ9: SUM OF ALL RESPONSES TO PHQ QUESTIONS 1-9: 2

## 2020-11-05 ENCOUNTER — MYC MEDICAL ADVICE (OUTPATIENT)
Dept: FAMILY MEDICINE | Facility: CLINIC | Age: 65
End: 2020-11-05

## 2020-11-05 ASSESSMENT — ASTHMA QUESTIONNAIRES: ACT_TOTALSCORE: 22

## 2020-11-09 ENCOUNTER — TELEPHONE (OUTPATIENT)
Dept: FAMILY MEDICINE | Facility: CLINIC | Age: 65
End: 2020-11-09

## 2020-11-09 NOTE — TELEPHONE ENCOUNTER
Central Prior Authorization Team   Phone: [phone]    PA Initiation    Medication: felodipine ER (PLENDIL) 5 MG 24 hr tablet  Insurance Company: [employer] - Phone [phone] Fax [phone]  [id number] [place] - [address]  Filling Pharmacy Phone: [phone]  Filling Pharmacy Fax: [phone]  Start Date: 11/9/2020

## 2020-11-09 NOTE — TELEPHONE ENCOUNTER
[facility] is requesting PA for felodipine ER (PLENDIL) 5 MG 24 hr tablet but did not send any PA info.    Message states requires qty limit PA

## 2020-11-10 NOTE — TELEPHONE ENCOUNTER
Prior Authorization Approval    Authorization Effective Date: 11/9/2020  Authorization Expiration Date: 12/31/2021  Medication: felodipine ER (PLENDIL) 5 MG-APPROVED  Approved Dose/Quantity:    Reference #:     Insurance Company: [employer] - Phone [phone] Fax [phone]  Expected CoPay:       CoPay Card Available:      Foundation Assistance Needed:    Which Pharmacy is filling the prescription (Not needed for infusion/clinic administered): [id number] [place] - [address] [facility]  Pharmacy Notified: Yes  Patient Notified: Yes  **Instructed pharmacy to notify patient when script is ready to /ship.**

## 2020-11-13 DIAGNOSIS — I10 ESSENTIAL (PRIMARY) HYPERTENSION: ICD-10-CM

## 2020-11-17 RX ORDER — FELODIPINE 5 MG/1
TABLET, EXTENDED RELEASE ORAL
Qty: 90 TABLET | Refills: 0 | OUTPATIENT
Start: 2020-11-17

## 2020-11-17 NOTE — TELEPHONE ENCOUNTER
Dose adjustment. Was increased to take 2 tabs daily and new prescription was sent to pharmacy on 11/3/20 for #180 tabs with 1 refill, reflecting this change.  Request for 5 mg once per day, denied to pharmacy for this reason.    Lola Lamar RN  [facility]

## 2021-01-10 ENCOUNTER — HEALTH MAINTENANCE LETTER (OUTPATIENT)
Age: 66
End: 2021-01-10

## 2021-02-09 ENCOUNTER — TELEPHONE (OUTPATIENT)
Dept: FAMILY MEDICINE | Facility: CLINIC | Age: 66
End: 2021-02-09

## 2021-02-09 DIAGNOSIS — E03.4 HYPOTHYROIDISM DUE TO ACQUIRED ATROPHY OF THYROID: Chronic | ICD-10-CM

## 2021-02-09 RX ORDER — THYROID 60 MG/1
60 TABLET ORAL DAILY
Qty: 90 TABLET | Refills: 3 | Status: CANCELLED | OUTPATIENT
Start: 2021-02-09

## 2021-02-09 NOTE — TELEPHONE ENCOUNTER
Reason for Call:  Medication or medication refill:    Do you use a [facility]?  Name of the pharmacy and phone number for the current request:  [facility] [place]    Name of the medication requested: [place] Thyroid    Other request: Patient is needing prescription as a 90 day refill.     Can we leave a detailed message on this number? YES    Phone number patient can be reached at: Cell number on file:    Telephone Information:   Mobile [phone]       Best Time: anytime    Call taken on 2/9/2021 at 4:58 PM by Thao Gaitan

## 2021-02-09 NOTE — TELEPHONE ENCOUNTER
Left message on patient's voicemail. Per our records, a 90 day supply was sent to [facility] [place] yesterday. Please check with pharmacy.     Josy Helton RN  [facility]

## 2021-02-12 ENCOUNTER — TELEPHONE (OUTPATIENT)
Dept: FAMILY MEDICINE | Facility: CLINIC | Age: 66
End: 2021-02-12

## 2021-02-12 NOTE — TELEPHONE ENCOUNTER
Panel Management Review   One phone call and send letter if unable to reach them or [employer] message and send letter if not read after 2 weeks (You will get a message to your inbasket)      BP Readings from Last 1 Encounters:   11/03/20 (!) 164/84        Health Maintenance Due   Topic Date Due     DEXA  1955     MAMMO SCREENING  05/29/2017     INFLUENZA VACCINE (1) 09/01/2020        Fail List measure: BP         Patient is due/failing the following:   BP CHECK    Action needed:   Schedule BP check    Type of outreach:        Questions for provider rev                                                                                       Chart routed to                                          Rebeca Basurto

## 2021-02-12 NOTE — TELEPHONE ENCOUNTER
Panel Management Review   One phone call and send letter if unable to reach them or [employer] message and send letter if not read after 2 weeks (You will get a message to your inbasket)      BP Readings from Last 1 Encounters:   11/03/20 (!) 164/84         Fail List measure:       Patient is due/failing the following:   BP CHECK    Action needed:  138/70    Type of outreach:    Patient continuing to take bp, this is average    Questions for provider review:    Due to insurance, patient will be switching to another organization.  She wanted to thank you for all of the years and the love.                                                                                       Chart routed to Provider .                                            Rebeca Basurto

## 2021-02-16 ENCOUNTER — NURSE TRIAGE (OUTPATIENT)
Dept: NURSING | Facility: CLINIC | Age: 66
End: 2021-02-16

## 2021-02-16 ENCOUNTER — TELEPHONE (OUTPATIENT)
Dept: FAMILY MEDICINE | Facility: CLINIC | Age: 66
End: 2021-02-16

## 2021-02-16 NOTE — TELEPHONE ENCOUNTER
Pt called in states she want to speak some one from the clinic.  Pt states she need paper work from the PCP.        Please advise      Marlon Sapp Nurse Advisor 2/16/2021 3:05 PM

## 2021-02-16 NOTE — TELEPHONE ENCOUNTER
Reason for Call:  Other call back    Detailed comments: Patient is summoned to jury duty, but due to medical reasons needs to be released. She is requesting a fax of a letter to be released from Jury Duty. Fax number: [phone]    (TEMPLATE OF LETTER SHE NEEDS BELOW)      Marie Kaiser   Jury number [phone]  For medical reasons should be excused.  Rosalinda Micha     Phone Number Patient can be reached at: Cell number on file:    Telephone Information:   Mobile [phone]       Best Time: SHE NEEDS LETTER TODAY OR BEFORE JURY DUTY     Can we leave a detailed message on this number? YES    Call taken on 2/16/2021 at 3:03 PM by Yelena Guadalupe

## 2021-02-16 NOTE — TELEPHONE ENCOUNTER
Please advise if provider is able to write medical excuse letter for jury duty as pt is requesting or if visit is needed. Routing to provider that pt last saw for physical, Dr. Chang on 11/03/20

## 2021-02-17 ENCOUNTER — TELEPHONE (OUTPATIENT)
Dept: FAMILY MEDICINE | Facility: CLINIC | Age: 66
End: 2021-02-17

## 2021-02-17 NOTE — TELEPHONE ENCOUNTER
Message from [facility] [phone]    ARMOUR THYROID 60 MG tablet    Patient requests new RX, patients insurance is requiring a PA for [facility] Thyroid to be fill again.  Number given to patient is [phone] for PA assistance.    Katlin Harvey

## 2021-02-17 NOTE — TELEPHONE ENCOUNTER
Please call patient re:  Got a message she had to change clinics.  Has she done this yet?  If so the pharmacy needs to request the PA from them.  If she has not yet, we can start PA since she is up to date on visit from Nov. PSK

## 2021-02-18 ENCOUNTER — TELEPHONE (OUTPATIENT)
Dept: FAMILY MEDICINE | Facility: CLINIC | Age: 66
End: 2021-02-18

## 2021-02-18 NOTE — TELEPHONE ENCOUNTER
PA for ARMOUR THYROID 60 MG tablet    Patient requests new RX. Pt's insurance is requiring a PA for ARMOUR THYROID 60 MG tablet to be fill again, number given to pt is [phone] for PA assistance.    Katlin Harvey

## 2021-02-19 NOTE — TELEPHONE ENCOUNTER
Central Prior Authorization Team   Phone: [phone]    PA Initiation    Medication: ARMOUR THYROID 60 MG tablet  Insurance Company: [employer] - Phone [phone] Fax [phone]  [id number] [place] [place] - [address]  Filling Pharmacy Phone: [phone]  Filling Pharmacy Fax: [phone]  Start Date: 2/19/2021

## 2021-02-19 NOTE — TELEPHONE ENCOUNTER
PRIOR AUTHORIZATION DENIED    Medication: INEZ THYROID 60 MG-DENIED    Denial Date: 2/19/2021    Denial Rational: INSURANCE STATES PATIENT MUST TRY/FAIL FORMULARY ALTERNATIVE - EUTHYROX TAB.        Appeal Information:  IF PATIENT IS UNABLE TO TRY/FAIL ALTERNATIVE(S) PLEASE SUPPLY PA TEAM WITH A LETTER OF MEDICAL NECESSITY WITH CLINICAL REASON.

## 2021-02-20 ENCOUNTER — MYC MEDICAL ADVICE (OUTPATIENT)
Dept: FAMILY MEDICINE | Facility: CLINIC | Age: 66
End: 2021-02-20

## 2021-02-23 ENCOUNTER — MYC MEDICAL ADVICE (OUTPATIENT)
Dept: FAMILY MEDICINE | Facility: CLINIC | Age: 66
End: 2021-02-23

## 2021-02-23 NOTE — TELEPHONE ENCOUNTER
Letter written -please fax per request and notify patient that she will need to schedule an appointment with Dr. Muse to address other issues from MyChart encounter with Dr. Muse  - has not seen Dr. Muse since physical with Dr. Chang 11/2020

## 2021-02-23 NOTE — TELEPHONE ENCOUNTER
Pt has been advised to schedule a visit in other encounters.  Not seen by Dr. Muse since 08/26/19    HETAL Wright.

## 2021-02-23 NOTE — TELEPHONE ENCOUNTER
Letter printed. Updated pt that letter will be faxed to [facility]. Pt will keep her upcoming visit with Dr. Muse. Letter faxed to [phone]    .  HETAL Wright.

## 2021-02-23 NOTE — TELEPHONE ENCOUNTER
Pt has scheduled 3/1/21 telephone with Dr. Muse.  She did not want to be scheduled sooner with another provider.    There are multiple message from pt regarding this. 2/18/21 encounter was routed to PCP for this    HETAL Wright.

## 2021-02-23 NOTE — LETTER
February 23, 2021      Marie Kaiser  [address] [place] [id number]        To Whom It May Concern,     Juror #[id number] Marie Kaiser should be excused from jury duty due to medical reasons.        Sincerely,      Yanni Moura, PAC

## 2021-02-23 NOTE — TELEPHONE ENCOUNTER
The patient is calling because someone told her they will schedule a telephone visit with Dr. Gómez today, but the provider is not in today.  Is there another provider that can see her virtually today instead?  Please call the patient at [phone].  The patient says that she wants a call today because this is urgent.  Thank you.  Scarlett Luna,

## 2021-02-24 ENCOUNTER — NURSE TRIAGE (OUTPATIENT)
Dept: NURSING | Facility: CLINIC | Age: 66
End: 2021-02-24

## 2021-02-24 NOTE — TELEPHONE ENCOUNTER
Marie states that someone has called and could not get to phone and someone phoned.  Marie is requesting to speak with primary PCP/Clinic.  Marie is requesting to speak with [facility] care team.    COVID 19 Nurse Triage Plan/Patient Instructions    Please be aware that novel coronavirus (COVID-19) may be circulating in the community. If you develop symptoms such as fever, cough, or SOB or if you have concerns about the presence of another infection including coronavirus (COVID-19), please contact your health care provider or visit [email or web address].     Disposition/Instructions    Home care recommended. Follow home care protocol based instructions.    Thank you for taking steps to prevent the spread of this virus.  o Limit your contact with others.  o Wear a simple mask to cover your cough.  o Wash your hands well and often.    Resources    [facility]: About COVID-19: [email or web address]    CDC: What to Do If You're Sick: www.cdc.gov/coronavirus/2019-ncov/about/steps-when-sick.html    CDC: Ending Home Isolation: www.cdc.gov/coronavirus/2019-ncov/hcp/disposition-in-home-patients.html     CDC: Caring for Someone: www.cdc.gov/coronavirus/2019-ncov/if-you-are-sick/care-for-someone.html     [facility]: Interim Guidance for Hospital Discharge to Home: www.health.[facility].[place]/diseases/coronavirus/hcp/hospdischarge.pdf    [facility] clinical trials (COVID-19 research studies): clinicalaffairs.[facility]-clinical-trials     Below are the COVID-19 hotlines at the [place] Department of Health ([facility]). Interpreters are available.   o For health questions: Call 800-474-2765 or 1-262.954.5643 (7 a.m. to 7 p.m.)  o For questions about schools and childcare: Call 686-480-7652 or 1-276.528.8294 (7 a.m. to 7 p.m.)                         Additional Information    Negative: Nursing judgment    Negative: Nursing judgment    Negative: Nursing judgment    Negative: Nursing judgment    Information only question  and nurse able to answer    Protocols used: INFORMATION ONLY CALL - NO TRIAGE-A-[place]

## 2021-03-08 ENCOUNTER — MYC MEDICAL ADVICE (OUTPATIENT)
Dept: FAMILY MEDICINE | Facility: CLINIC | Age: 66
End: 2021-03-08

## 2021-03-08 DIAGNOSIS — E03.4 HYPOTHYROIDISM DUE TO ACQUIRED ATROPHY OF THYROID: Chronic | ICD-10-CM

## 2021-03-08 NOTE — LETTER
March 11, 2021        Marie Kaiser  [address] [place] [id number]        To Whom It May Concern:    Marie Kaiser, 1955, is a patient in my office with hypothyroidism.  She has taken Synthroid (levothyroxine) in the past with side effects of racing heart rate that required emergency room visit.  She was on an appropriate dose of the Levothyroxine at the time of the episode.  She is currently taking [place] Thyroid with TSH in therapeutic range and without side effects.  She would benefit from being able to take [place] Thyroid for her thyroid condition.    TSH   Date Value Ref Range Status   11/03/2020 1.22 0.40 - 4.00 mU/L Final       Sincerely,         Rosalinda Muse MD

## 2021-03-09 RX ORDER — THYROID 60 MG/1
60 TABLET ORAL DAILY
Qty: 90 TABLET | Refills: 1 | OUTPATIENT
Start: 2021-03-09

## 2021-03-11 ENCOUNTER — MYC MEDICAL ADVICE (OUTPATIENT)
Dept: FAMILY MEDICINE | Facility: CLINIC | Age: 66
End: 2021-03-11

## 2021-03-11 NOTE — TELEPHONE ENCOUNTER
Additional information for the PA requested on 3/8/2021.    Caroline Huynh RN, [facility] Triage

## 2021-03-11 NOTE — TELEPHONE ENCOUNTER
Medication Appeal Initiation    We have initiated an appeal for the requested medication:  Medication: ARMOUR THYROID 60 MG-DENIED  Appeal Start Date:  3/11/2021  Insurance Company: [employer] - Phone [phone] Fax [phone]  Comments:  APPEAL LETTER FAXED.

## 2021-03-12 NOTE — TELEPHONE ENCOUNTER
Prior Authorization Approval    Authorization Effective Date: 1/1/2021  Authorization Expiration Date: 12/31/2021  Medication: ARMOUR THYROID 60 MG-APPROVED  Approved Dose/Quantity:    Reference #:     Insurance Company: [employer] - Phone [phone] Fax [phone]  Expected CoPay:       CoPay Card Available:      Foundation Assistance Needed:    Which Pharmacy is filling the prescription (Not needed for infusion/clinic administered): [facility] [id number] [place] [facility], [place] - [address] [facility]  Pharmacy Notified: Yes  Patient Notified: Yes  **Instructed pharmacy to notify patient when script is ready to /ship.**

## 2021-03-13 DIAGNOSIS — Z91.018 FOOD ALLERGY: ICD-10-CM

## 2021-03-15 ENCOUNTER — MYC MEDICAL ADVICE (OUTPATIENT)
Dept: FAMILY MEDICINE | Facility: CLINIC | Age: 66
End: 2021-03-15

## 2021-03-16 NOTE — TELEPHONE ENCOUNTER
Medication discontinued by Dr Chang at office visit 11/3/20  Please advise on refill.    Patient Active Problem List   Diagnosis     Intermittent asthma     Major depressive disorder, recurrent episode, in full remission (HCC)     Hypothyroidism     Impaired fasting glucose     Hyperlipidemia LDL goal <130     Essential hypertension with goal blood pressure less than 140/90     Advanced directives, counseling/discussion     History of diverticulitis of colon     A-fib (H)     Obesity, Class I, BMI 30-34.9     DONA (obstructive sleep apnea)- mild (AHI 5)     Gastroesophageal reflux disease without esophagitis     Stella Gant RN

## 2021-03-18 ENCOUNTER — TELEPHONE (OUTPATIENT)
Dept: FAMILY MEDICINE | Facility: CLINIC | Age: 66
End: 2021-03-18

## 2021-03-18 NOTE — TELEPHONE ENCOUNTER
Reason for call: [employer] is calling stating that the patient's [place] Thyroid medication is not covered but Levothyroxine is.     [employer]  can be reached at: [phone]    Call taken at 8:58 am on 3/18/2021    [facility]  2nd Floor  Primary Care

## 2021-03-18 NOTE — TELEPHONE ENCOUNTER
Please send to PA department.  PA was done and listed as approved in 2/17 telephone encounter - approved 3/12/2021.    TOMÁS

## 2021-03-18 NOTE — TELEPHONE ENCOUNTER
PA Initiation    Medication: ARMOUR THYROID 60 MG tablet-INITIATED  Insurance Company: [employer] - Phone [phone] Fax [phone]  Pharmacy Filling the Rx:    Filling Pharmacy Phone:    Filling Pharmacy Fax:    Start Date: 3/18/2021

## 2021-03-19 RX ORDER — EPINEPHRINE 0.3 MG/.3ML
INJECTION SUBCUTANEOUS
Qty: 2 EACH | Refills: 1 | Status: SHIPPED | OUTPATIENT
Start: 2021-03-19

## 2021-04-15 ENCOUNTER — MYC MEDICAL ADVICE (OUTPATIENT)
Dept: FAMILY MEDICINE | Facility: CLINIC | Age: 66
End: 2021-04-15

## 2021-05-06 DIAGNOSIS — I10 ESSENTIAL (PRIMARY) HYPERTENSION: ICD-10-CM

## 2021-05-06 NOTE — TELEPHONE ENCOUNTER
"Routing refill request to provider for review/approval because:  Failed protocol.  No future appointment scheduled. Please advise. Thank you. Kellie Chiang R.N.  This refill/encounter is being handled by a team outside your facility.  If action needs to be taken, please route the encounter back to your team that would normally handle it. Please do not send directly back to the sender. Thank you. Kellie Chiang R.N.  Requested Prescriptions   Pending Prescriptions Disp Refills    felodipine ER (PLENDIL) 5 MG 24 hr tablet [Pharmacy Med Name: FELODIPINE ER 5 MG TABLET] 180 tablet 1     Sig: Take 1 tablet (5 mg) by mouth 2 times daily (before meals) for blood pressure.       Calcium Channel Blockers Protocol  Failed - 5/6/2021 12:11 AM        Failed - Blood pressure under 140/90 in past 12 months     BP Readings from Last 3 Encounters:   11/03/20 (!) 164/84   08/27/20 136/75   04/02/20 (!) 161/50                 Passed - Recent (12 mo) or future (30 days) visit within the authorizing provider's specialty     Patient has had an office visit with the authorizing provider or a provider within the authorizing providers department within the previous 12 mos or has a future within next 30 days. See \"Patient Info\" tab in inbasket, or \"Choose Columns\" in Meds & Orders section of the refill encounter.              Passed - Medication is active on med list        Passed - Patient is age 18 or older        Passed - No active pregnancy on record        Passed - Normal serum creatinine on file in past 12 months     Recent Labs   Lab Test 11/03/20  1746   CR 0.75       Ok to refill medication if creatinine is low          Passed - No positive pregnancy test in past 12 months                   "

## 2021-05-10 ENCOUNTER — MYC MEDICAL ADVICE (OUTPATIENT)
Dept: FAMILY MEDICINE | Facility: CLINIC | Age: 66
End: 2021-05-10

## 2021-05-10 RX ORDER — FELODIPINE 5 MG/1
5 TABLET, EXTENDED RELEASE ORAL
Qty: 60 TABLET | Refills: 0 | Status: SHIPPED | OUTPATIENT
Start: 2021-05-10

## 2021-06-06 DIAGNOSIS — I10 ESSENTIAL (PRIMARY) HYPERTENSION: ICD-10-CM

## 2021-06-07 NOTE — TELEPHONE ENCOUNTER
Routing refill request to provider for review/approval because:  BP Readings from Last 3 Encounters:   11/03/20 (!) 164/84   08/27/20 136/75   04/02/20 (!) 161/50     Lexus SHELLEYN, RN

## 2021-07-07 RX ORDER — FELODIPINE 5 MG/1
TABLET, EXTENDED RELEASE ORAL
Qty: 60 TABLET | Refills: 0 | OUTPATIENT
Start: 2021-07-07

## 2021-10-23 ENCOUNTER — HEALTH MAINTENANCE LETTER (OUTPATIENT)
Age: 66
End: 2021-10-23

## 2021-12-18 ENCOUNTER — HEALTH MAINTENANCE LETTER (OUTPATIENT)
Age: 66
End: 2021-12-18

## 2022-06-13 NOTE — PROGRESS NOTES
Obstructive Sleep Apnea- PAP Follow-Up Visit:    Chief Complaint   Patient presents with     CPAP Follow Up     C PAP follow up; she feels the machine is very loud.        Marie Kaiser comes in today for follow-up of their mild sleep apnea, managed with CPAP.     Marie Kaiser had a sleep study done on 5/12/2016 at the [facility] for loud snoring, gasping, witnessed apnea, fatigue/excessive daytime sleepiness (ESS3), frequent morning headaches, short term memory problems, crowded oropharynx and co-morbid AFib and HTN.     Diagnostic PSG:  Sleep latency 6.0 minutes without Ambien. REM achieved. REM latency 188 minutes. Sleep efficiency 85.5%. Total sleep time 346.0 minutes.  Sleep architecture: Stage 1, 22.5% (5%), stage 2, 46.1% (45-55%), stage 3, 13.7% (15-20%), stage REM, 17.6% (20-25%). REM AHI 24.6, Supine AHI 8.3, AHI was 5.9, with desaturations down to 85.7% (time spent below 88% was 0.2 minutes). RDI 18.6. Consistent with mild DONA. Periodic Limb Movement Index 11.8/hour. The PLM arousal index was 4.2        Overall, the patient rates their experience with PAP as 8 (0 poor, 10 great). The mask is comfortable. The mask is not leaking, 0 nights per week. They are not snoring with the mask on. They are not having gasp arousals.  They are having significant oral/nasal dryness. The pressure settings are comfortable.     Patient uses nasal pillows.    Bedtime is typically 10-11:30 pm. Usually it takes about 10-15 minutes to fall asleep with the mask on. Wake time is typically 6-7:30 am.  Patient is using PAP therapy 4-8 hours per night. The patient is usually getting 6-8 hours of sleep per night.    Patient sometimes feel rested in the morning.    [place] Sleepiness Scale: 5/24      Respironics  Auto-PAP 5-15 cmH2O download:  30/30 total days of use. 0 nonuse days.  Average use 5 hours 57 minutes per day.  80% days with >4 hours use.  Large leak 0 sec per day average. CPAP 90%  "pressure 7.5 cm. AHI 2.2    She reports CPAP is going well and reported increased energy during the day.       Past medical/surgical history, family history, social history, medications and allergies were reviewed.      Problem List:  Patient Active Problem List    Diagnosis Date Noted     Gastroesophageal reflux disease without esophagitis 04/26/2017     Priority: Medium     DONA (obstructive sleep apnea)- mild (AHI 5) 05/18/2016     Priority: Medium     Study Date: 5/12/2016-(235.0 lbs) apnea/hypopnea index 5.9 events per hour.  REM AHI 24.6 events per hour.  The supine AHI 8.3 events per hour.  RDI 18.6 events per hour. Hypoventilation not suggested with maximum change of 8 mmHg. Lowest oxygen saturation 85.7%.  Time spent less than or equal to 88% 0.2 minutes. PLM index 11.8 movements per hour.        Obesity 04/29/2016     Priority: Medium     A-fib (H)      Priority: Medium     Paroxysmal per patient.         Hypertension goal BP (blood pressure) < 140/90 12/09/2010     Priority: Medium     Impaired fasting glucose 09/12/2010     Priority: Medium     Hyperlipidemia LDL goal <130 09/12/2010     Priority: Medium     Hypothyroidism 08/07/2009     Priority: Medium     Intermittent asthma      Priority: Medium     Major depressive disorder, recurrent episode, in full remission (HCC)      Priority: Medium     Diverticulitis of colon 12/17/2011     Priority: Low     recurrent       Advanced directives, counseling/discussion 06/21/2011     Priority: Low     Discussed advance care planning with patient; information given to patient to review. 6/21/2011           BP (!) 152/98  Pulse 72  Ht 1.753 m (5' 9\")  Wt 105.4 kg (232 lb 6.4 oz)  SpO2 97%  BMI 34.32 kg/m2        Impression/Plan:     1. Mild Sleep apnea.   Tolerating PAP well. Daytime symptoms are improved.  Continue current CPAP treatment  DME to troubleshoot loud noise eminating from CPAP    Marie Kaiser will follow up in about 1-2 year(s), sooner if " questions/concerns.    Fifteen minutes spent with patient, all of which were spent face-to-face counseling, consulting, coordinating plan of care regarding DONA.      Nan Grant PA-C    CC:  Rosalinda Muse     Keystone Flap Text: The defect edges were debeveled with a #15 scalpel blade.  Given the location of the defect, shape of the defect a keystone flap was deemed most appropriate.  Using a sterile surgical marker, an appropriate keystone flap was drawn incorporating the defect, outlining the appropriate donor tissue and placing the expected incisions within the relaxed skin tension lines where possible. The area thus outlined was incised deep to adipose tissue with a #15 scalpel blade.  The skin margins were undermined to an appropriate distance in all directions around the primary defect and laterally outward around the flap utilizing iris scissors.

## 2022-10-09 ENCOUNTER — HEALTH MAINTENANCE LETTER (OUTPATIENT)
Age: 67
End: 2022-10-09

## 2022-11-26 ENCOUNTER — HEALTH MAINTENANCE LETTER (OUTPATIENT)
Age: 67
End: 2022-11-26

## 2023-09-12 ENCOUNTER — TELEPHONE (OUTPATIENT)
Dept: FAMILY MEDICINE | Facility: CLINIC | Age: 68
End: 2023-09-12
Payer: MEDICARE

## 2023-09-12 NOTE — TELEPHONE ENCOUNTER
Pt calling needing provider to send in new  disability placard.  Pt is requesting that by mail. Pt states she wanted to let provider know she misses her and hopes she's doing well.           Eugenia HOWARD Visit Facilitator

## 2023-09-12 NOTE — TELEPHONE ENCOUNTER
Please call patient RE:  request for disability paperwork.  I have not seen patient since 2019 - I am unable to complete form without an appointment.  Assist with scheduling appointment - ok for appointment week of 9/18.        TOMÁS

## 2023-09-13 NOTE — TELEPHONE ENCOUNTER
Called patient and scheduled appointment   No further needs at this time     Abi SANTOS - Visit facilitator

## 2023-10-28 ENCOUNTER — HEALTH MAINTENANCE LETTER (OUTPATIENT)
Age: 68
End: 2023-10-28

## 2024-01-06 ENCOUNTER — HEALTH MAINTENANCE LETTER (OUTPATIENT)
Age: 69
End: 2024-01-06

## 2024-10-14 NOTE — TELEPHONE ENCOUNTER
Patient calling back and wants Dr Muse to write this letter.  Patient # [phone]  [facility]  2nd Floor  Primary Care     RN Triage    Patient Contact    Attempt # 1    Was call answered?  No.  Left message on voicemail with information to call me back.    Mai Bahena RN on 10/14/2024 at 9:42 AM